# Patient Record
Sex: FEMALE | Race: WHITE | NOT HISPANIC OR LATINO
[De-identification: names, ages, dates, MRNs, and addresses within clinical notes are randomized per-mention and may not be internally consistent; named-entity substitution may affect disease eponyms.]

---

## 2017-05-17 ENCOUNTER — APPOINTMENT (OUTPATIENT)
Dept: PHYSICAL MEDICINE AND REHAB | Facility: CLINIC | Age: 72
End: 2017-05-17

## 2017-05-17 VITALS
HEART RATE: 77 BPM | SYSTOLIC BLOOD PRESSURE: 114 MMHG | OXYGEN SATURATION: 98 % | TEMPERATURE: 98.1 F | DIASTOLIC BLOOD PRESSURE: 71 MMHG

## 2017-05-17 DIAGNOSIS — G54.6 PHANTOM LIMB SYNDROME WITH PAIN: ICD-10-CM

## 2018-10-17 ENCOUNTER — APPOINTMENT (OUTPATIENT)
Dept: PHYSICAL MEDICINE AND REHAB | Facility: CLINIC | Age: 73
End: 2018-10-17
Payer: MEDICARE

## 2018-10-17 VITALS — HEART RATE: 83 BPM | SYSTOLIC BLOOD PRESSURE: 146 MMHG | DIASTOLIC BLOOD PRESSURE: 82 MMHG

## 2018-10-17 PROCEDURE — 99213 OFFICE O/P EST LOW 20 MIN: CPT | Mod: GC

## 2019-04-30 ENCOUNTER — APPOINTMENT (OUTPATIENT)
Dept: PHYSICAL MEDICINE AND REHAB | Facility: CLINIC | Age: 74
End: 2019-04-30
Payer: MEDICARE

## 2019-04-30 VITALS — OXYGEN SATURATION: 98 % | DIASTOLIC BLOOD PRESSURE: 71 MMHG | HEART RATE: 73 BPM | SYSTOLIC BLOOD PRESSURE: 112 MMHG

## 2019-04-30 DIAGNOSIS — Z89.611 ACQUIRED ABSENCE OF RIGHT LEG ABOVE KNEE: ICD-10-CM

## 2019-04-30 PROCEDURE — 99213 OFFICE O/P EST LOW 20 MIN: CPT

## 2019-04-30 NOTE — PHYSICAL EXAM
[FreeTextEntry1] : General: Well developed female in no apparent distress. Patient is awake, alert, and oriented x3. Cooperative.\par \par Extremities: Right AKA which is conical in shape, no skin adhesions, no skin breakdown.   mild distal erythema noted. No callus formation. Fleshy in the posterior aspect. Full active range of motion.\par \par Motor:\par All upper extremities-tone normal, active range of motion within functional limits with 5/5 motor power throughout. Thumb to digit opposition intact bilaterally.\par Both lower extremities-tone normal, active range of motion within functional limits with 5/5 motor power throughout area at\par \par Sensory: Intact to light touch and pinprick in both lower extremities.\par \par Functional status: Patient with right AK prosthesis with the microprocessor knee unit and ischial containment socket. Patient clearly descending too far into the socket. Patient ambulated with a vaulting gait with min knee flexion noted during swing phase. Mild Trendelenburg gait present on the right. Patient is a K3 ambulator.

## 2019-04-30 NOTE — REVIEW OF SYSTEMS
[Recent Change In Weight] : ~T recent weight change [Difficulty Walking] : difficulty walking [Negative] : Gastrointestinal [Fever] : no fever [Incontinence] : no incontinence [Muscle Weakness] : no muscle weakness

## 2019-04-30 NOTE — HISTORY OF PRESENT ILLNESS
[FreeTextEntry1] : Patient is a 73-year-old female history of hypertension, PVD, status post right AKA (2010) who presents for a prosthetic evaluation. Patient received an AK socket replacement in October 2018. Patient reports losing approximately 5 pounds and has volume loss at the residual limb resulting in the current socket being too large. Patient states she's migrating to far into the socket causing pain at her groin and episodic redness. No skin breakdown. Patient has reported episodes of losing suction suspension and the prosthesis fell off one time. Functionally she is an independent ambulator with her right AK prosthesis SAC in the community. Patient is independent in all transfers and activities of daily living.

## 2019-04-30 NOTE — ASSESSMENT
[FreeTextEntry1] : Patient is a 73-year-old white female history of hypertension, peripheral vascular disease who is status post right AKA (2010) current prosthetic socket is too large, ill fitting and causing pain and gait impairments noted above. In addition patient has been losing suction and the liner is loose and the seal is not applying contact with the socket.Patient requires a prosthetic socket replacement to improve comfort, gait mechanics and safe ambulation. Patient is a K3 ambulator. Prescription provided for a new right custom molded AK Socket replacement with a total contact acrylic socket, flexible inner socket, silicone seal- in liner, ultralight allignable components, outer protective cover, 6 multiple ply socks, 6 single ply socks.

## 2021-01-01 ENCOUNTER — INPATIENT (INPATIENT)
Facility: HOSPITAL | Age: 76
LOS: 2 days | DRG: 871 | End: 2021-10-19
Attending: STUDENT IN AN ORGANIZED HEALTH CARE EDUCATION/TRAINING PROGRAM | Admitting: STUDENT IN AN ORGANIZED HEALTH CARE EDUCATION/TRAINING PROGRAM
Payer: MEDICARE

## 2021-01-01 ENCOUNTER — RESULT REVIEW (OUTPATIENT)
Age: 76
End: 2021-01-01

## 2021-01-01 VITALS
RESPIRATION RATE: 18 BRPM | DIASTOLIC BLOOD PRESSURE: 62 MMHG | SYSTOLIC BLOOD PRESSURE: 82 MMHG | OXYGEN SATURATION: 91 % | TEMPERATURE: 98 F | WEIGHT: 110.01 LBS | HEART RATE: 94 BPM

## 2021-01-01 VITALS — RESPIRATION RATE: 30 BRPM | HEART RATE: 98 BPM | OXYGEN SATURATION: 62 %

## 2021-01-01 DIAGNOSIS — Z29.9 ENCOUNTER FOR PROPHYLACTIC MEASURES, UNSPECIFIED: ICD-10-CM

## 2021-01-01 DIAGNOSIS — R09.89 OTHER SPECIFIED SYMPTOMS AND SIGNS INVOLVING THE CIRCULATORY AND RESPIRATORY SYSTEMS: ICD-10-CM

## 2021-01-01 DIAGNOSIS — J18.9 PNEUMONIA, UNSPECIFIED ORGANISM: ICD-10-CM

## 2021-01-01 DIAGNOSIS — Z98.890 OTHER SPECIFIED POSTPROCEDURAL STATES: Chronic | ICD-10-CM

## 2021-01-01 DIAGNOSIS — Z86.59 PERSONAL HISTORY OF OTHER MENTAL AND BEHAVIORAL DISORDERS: ICD-10-CM

## 2021-01-01 DIAGNOSIS — K21.9 GASTRO-ESOPHAGEAL REFLUX DISEASE WITHOUT ESOPHAGITIS: ICD-10-CM

## 2021-01-01 DIAGNOSIS — Z96.649 PRESENCE OF UNSPECIFIED ARTIFICIAL HIP JOINT: Chronic | ICD-10-CM

## 2021-01-01 DIAGNOSIS — I10 ESSENTIAL (PRIMARY) HYPERTENSION: ICD-10-CM

## 2021-01-01 DIAGNOSIS — A41.9 SEPSIS, UNSPECIFIED ORGANISM: ICD-10-CM

## 2021-01-01 DIAGNOSIS — I95.9 HYPOTENSION, UNSPECIFIED: ICD-10-CM

## 2021-01-01 DIAGNOSIS — R53.1 WEAKNESS: ICD-10-CM

## 2021-01-01 DIAGNOSIS — I73.9 PERIPHERAL VASCULAR DISEASE, UNSPECIFIED: ICD-10-CM

## 2021-01-01 DIAGNOSIS — E03.9 HYPOTHYROIDISM, UNSPECIFIED: ICD-10-CM

## 2021-01-01 DIAGNOSIS — Z86.79 PERSONAL HISTORY OF OTHER DISEASES OF THE CIRCULATORY SYSTEM: ICD-10-CM

## 2021-01-01 DIAGNOSIS — E78.5 HYPERLIPIDEMIA, UNSPECIFIED: ICD-10-CM

## 2021-01-01 DIAGNOSIS — Z89.611 ACQUIRED ABSENCE OF RIGHT LEG ABOVE KNEE: Chronic | ICD-10-CM

## 2021-01-01 DIAGNOSIS — J96.01 ACUTE RESPIRATORY FAILURE WITH HYPOXIA: ICD-10-CM

## 2021-01-01 DIAGNOSIS — E87.8 OTHER DISORDERS OF ELECTROLYTE AND FLUID BALANCE, NOT ELSEWHERE CLASSIFIED: ICD-10-CM

## 2021-01-01 DIAGNOSIS — R91.8 OTHER NONSPECIFIC ABNORMAL FINDING OF LUNG FIELD: ICD-10-CM

## 2021-01-01 DIAGNOSIS — Z90.710 ACQUIRED ABSENCE OF BOTH CERVIX AND UTERUS: Chronic | ICD-10-CM

## 2021-01-01 DIAGNOSIS — J90 PLEURAL EFFUSION, NOT ELSEWHERE CLASSIFIED: ICD-10-CM

## 2021-01-01 LAB
ALBUMIN SERPL ELPH-MCNC: 1.2 G/DL — LOW (ref 3.3–5)
ALBUMIN SERPL ELPH-MCNC: 1.3 G/DL — LOW (ref 3.3–5)
ALBUMIN SERPL ELPH-MCNC: 1.5 G/DL — LOW (ref 3.3–5)
ALBUMIN SERPL ELPH-MCNC: 1.5 G/DL — LOW (ref 3.3–5)
ALBUMIN SERPL ELPH-MCNC: 1.6 G/DL — LOW (ref 3.3–5)
ALBUMIN SERPL ELPH-MCNC: 1.7 G/DL — LOW (ref 3.3–5)
ALP SERPL-CCNC: 161 U/L — HIGH (ref 40–120)
ALP SERPL-CCNC: 168 U/L — HIGH (ref 40–120)
ALP SERPL-CCNC: 183 U/L — HIGH (ref 40–120)
ALP SERPL-CCNC: 188 U/L — HIGH (ref 40–120)
ALP SERPL-CCNC: 197 U/L — HIGH (ref 40–120)
ALP SERPL-CCNC: 211 U/L — HIGH (ref 40–120)
ALT FLD-CCNC: 212 U/L — HIGH (ref 12–78)
ALT FLD-CCNC: 31 U/L — SIGNIFICANT CHANGE UP (ref 12–78)
ALT FLD-CCNC: 33 U/L — SIGNIFICANT CHANGE UP (ref 12–78)
ALT FLD-CCNC: 49 U/L — SIGNIFICANT CHANGE UP (ref 12–78)
ALT FLD-CCNC: 66 U/L — SIGNIFICANT CHANGE UP (ref 12–78)
ALT FLD-CCNC: 72 U/L — SIGNIFICANT CHANGE UP (ref 12–78)
ANION GAP SERPL CALC-SCNC: 10 MMOL/L — SIGNIFICANT CHANGE UP (ref 5–17)
ANION GAP SERPL CALC-SCNC: 12 MMOL/L — SIGNIFICANT CHANGE UP (ref 5–17)
ANION GAP SERPL CALC-SCNC: 15 MMOL/L — SIGNIFICANT CHANGE UP (ref 5–17)
ANION GAP SERPL CALC-SCNC: 20 MMOL/L — HIGH (ref 5–17)
ANION GAP SERPL CALC-SCNC: 7 MMOL/L — SIGNIFICANT CHANGE UP (ref 5–17)
ANION GAP SERPL CALC-SCNC: 7 MMOL/L — SIGNIFICANT CHANGE UP (ref 5–17)
ANION GAP SERPL CALC-SCNC: 8 MMOL/L — SIGNIFICANT CHANGE UP (ref 5–17)
APTT BLD: 26.5 SEC — LOW (ref 27.5–35.5)
APTT BLD: 40.9 SEC — HIGH (ref 27.5–35.5)
AST SERPL-CCNC: 101 U/L — HIGH (ref 15–37)
AST SERPL-CCNC: 1066 U/L — HIGH (ref 15–37)
AST SERPL-CCNC: 160 U/L — HIGH (ref 15–37)
AST SERPL-CCNC: 198 U/L — HIGH (ref 15–37)
AST SERPL-CCNC: 62 U/L — HIGH (ref 15–37)
AST SERPL-CCNC: 67 U/L — HIGH (ref 15–37)
B PERT IGG+IGM PNL SER: ABNORMAL
BASE EXCESS BLDA CALC-SCNC: -12.5 MMOL/L — LOW (ref -2–3)
BASE EXCESS BLDA CALC-SCNC: -16.5 MMOL/L — LOW (ref -2–3)
BASE EXCESS BLDA CALC-SCNC: -21.9 MMOL/L — LOW (ref -2–3)
BASE EXCESS BLDA CALC-SCNC: -3.1 MMOL/L — LOW (ref -2–3)
BASE EXCESS BLDA CALC-SCNC: -4.5 MMOL/L — LOW (ref -2–3)
BASE EXCESS BLDA CALC-SCNC: -5.6 MMOL/L — LOW (ref -2–3)
BASE EXCESS BLDA CALC-SCNC: -7.9 MMOL/L — LOW (ref -2–3)
BASE EXCESS BLDA CALC-SCNC: -8 MMOL/L — LOW (ref -2–3)
BASE EXCESS BLDA CALC-SCNC: -9.2 MMOL/L — LOW (ref -2–3)
BASE EXCESS BLDV CALC-SCNC: -4.7 MMOL/L — LOW (ref -2–3)
BASOPHILS # BLD AUTO: 0 K/UL — SIGNIFICANT CHANGE UP (ref 0–0.2)
BASOPHILS # BLD AUTO: 0.04 K/UL — SIGNIFICANT CHANGE UP (ref 0–0.2)
BASOPHILS # BLD AUTO: 0.04 K/UL — SIGNIFICANT CHANGE UP (ref 0–0.2)
BASOPHILS # BLD AUTO: 0.06 K/UL — SIGNIFICANT CHANGE UP (ref 0–0.2)
BASOPHILS NFR BLD AUTO: 0 % — SIGNIFICANT CHANGE UP (ref 0–2)
BASOPHILS NFR BLD AUTO: 0.2 % — SIGNIFICANT CHANGE UP (ref 0–2)
BASOPHILS NFR BLD AUTO: 0.2 % — SIGNIFICANT CHANGE UP (ref 0–2)
BASOPHILS NFR BLD AUTO: 0.3 % — SIGNIFICANT CHANGE UP (ref 0–2)
BILIRUB SERPL-MCNC: 0.4 MG/DL — SIGNIFICANT CHANGE UP (ref 0.2–1.2)
BILIRUB SERPL-MCNC: 0.4 MG/DL — SIGNIFICANT CHANGE UP (ref 0.2–1.2)
BILIRUB SERPL-MCNC: 0.5 MG/DL — SIGNIFICANT CHANGE UP (ref 0.2–1.2)
BILIRUB SERPL-MCNC: 0.5 MG/DL — SIGNIFICANT CHANGE UP (ref 0.2–1.2)
BILIRUB SERPL-MCNC: 0.6 MG/DL — SIGNIFICANT CHANGE UP (ref 0.2–1.2)
BILIRUB SERPL-MCNC: 0.9 MG/DL — SIGNIFICANT CHANGE UP (ref 0.2–1.2)
BLOOD GAS COMMENTS ARTERIAL: SIGNIFICANT CHANGE UP
BLOOD GAS COMMENTS, VENOUS: SIGNIFICANT CHANGE UP
BUN SERPL-MCNC: 10 MG/DL — SIGNIFICANT CHANGE UP (ref 7–23)
BUN SERPL-MCNC: 12 MG/DL — SIGNIFICANT CHANGE UP (ref 7–23)
BUN SERPL-MCNC: 15 MG/DL — SIGNIFICANT CHANGE UP (ref 7–23)
BUN SERPL-MCNC: 17 MG/DL — SIGNIFICANT CHANGE UP (ref 7–23)
BUN SERPL-MCNC: 17 MG/DL — SIGNIFICANT CHANGE UP (ref 7–23)
CALCIUM SERPL-MCNC: 6.7 MG/DL — LOW (ref 8.5–10.1)
CALCIUM SERPL-MCNC: 6.9 MG/DL — LOW (ref 8.5–10.1)
CALCIUM SERPL-MCNC: 7.2 MG/DL — LOW (ref 8.5–10.1)
CALCIUM SERPL-MCNC: 7.3 MG/DL — LOW (ref 8.5–10.1)
CALCIUM SERPL-MCNC: 7.4 MG/DL — LOW (ref 8.5–10.1)
CALCIUM SERPL-MCNC: 7.5 MG/DL — LOW (ref 8.5–10.1)
CALCIUM SERPL-MCNC: 7.7 MG/DL — LOW (ref 8.5–10.1)
CEA SERPL-MCNC: 35.7 NG/ML — HIGH (ref 0–3.8)
CHLORIDE SERPL-SCNC: 112 MMOL/L — HIGH (ref 96–108)
CHLORIDE SERPL-SCNC: 113 MMOL/L — HIGH (ref 96–108)
CHLORIDE SERPL-SCNC: 113 MMOL/L — HIGH (ref 96–108)
CHLORIDE SERPL-SCNC: 114 MMOL/L — HIGH (ref 96–108)
CHLORIDE SERPL-SCNC: 114 MMOL/L — HIGH (ref 96–108)
CHLORIDE SERPL-SCNC: 118 MMOL/L — HIGH (ref 96–108)
CHLORIDE SERPL-SCNC: 119 MMOL/L — HIGH (ref 96–108)
CO2 SERPL-SCNC: 11 MMOL/L — LOW (ref 22–31)
CO2 SERPL-SCNC: 13 MMOL/L — LOW (ref 22–31)
CO2 SERPL-SCNC: 21 MMOL/L — LOW (ref 22–31)
CO2 SERPL-SCNC: 23 MMOL/L — SIGNIFICANT CHANGE UP (ref 22–31)
CO2 SERPL-SCNC: 23 MMOL/L — SIGNIFICANT CHANGE UP (ref 22–31)
COLOR FLD: SIGNIFICANT CHANGE UP
COVID-19 SPIKE DOMAIN AB INTERP: POSITIVE
COVID-19 SPIKE DOMAIN ANTIBODY RESULT: 38.5 U/ML — HIGH
CREAT SERPL-MCNC: 0.31 MG/DL — LOW (ref 0.5–1.3)
CREAT SERPL-MCNC: 0.36 MG/DL — LOW (ref 0.5–1.3)
CREAT SERPL-MCNC: 0.38 MG/DL — LOW (ref 0.5–1.3)
CREAT SERPL-MCNC: 0.56 MG/DL — SIGNIFICANT CHANGE UP (ref 0.5–1.3)
CREAT SERPL-MCNC: 0.58 MG/DL — SIGNIFICANT CHANGE UP (ref 0.5–1.3)
CREAT SERPL-MCNC: 1 MG/DL — SIGNIFICANT CHANGE UP (ref 0.5–1.3)
CREAT SERPL-MCNC: 1.1 MG/DL — SIGNIFICANT CHANGE UP (ref 0.5–1.3)
CRP SERPL-MCNC: 29 MG/L — HIGH
EOSINOPHIL # BLD AUTO: 0 K/UL — SIGNIFICANT CHANGE UP (ref 0–0.5)
EOSINOPHIL # BLD AUTO: 0 K/UL — SIGNIFICANT CHANGE UP (ref 0–0.5)
EOSINOPHIL # BLD AUTO: 0.01 K/UL — SIGNIFICANT CHANGE UP (ref 0–0.5)
EOSINOPHIL # BLD AUTO: 0.02 K/UL — SIGNIFICANT CHANGE UP (ref 0–0.5)
EOSINOPHIL # FLD: 0 % — SIGNIFICANT CHANGE UP
EOSINOPHIL NFR BLD AUTO: 0 % — SIGNIFICANT CHANGE UP (ref 0–6)
EOSINOPHIL NFR BLD AUTO: 0.1 % — SIGNIFICANT CHANGE UP (ref 0–6)
FLUID INTAKE SUBSTANCE CLASS: SIGNIFICANT CHANGE UP
FLUID SEGMENTED GRANULOCYTES: 80 % — SIGNIFICANT CHANGE UP
FOLATE SERPL-MCNC: 10.4 NG/ML — SIGNIFICANT CHANGE UP
FOLATE+VIT B12 SERBLD-IMP: 0 % — SIGNIFICANT CHANGE UP
GAS PNL BLDA: SIGNIFICANT CHANGE UP
GLUCOSE SERPL-MCNC: 106 MG/DL — HIGH (ref 70–99)
GLUCOSE SERPL-MCNC: 120 MG/DL — HIGH (ref 70–99)
GLUCOSE SERPL-MCNC: 139 MG/DL — HIGH (ref 70–99)
GLUCOSE SERPL-MCNC: 149 MG/DL — HIGH (ref 70–99)
GLUCOSE SERPL-MCNC: 216 MG/DL — HIGH (ref 70–99)
GLUCOSE SERPL-MCNC: 256 MG/DL — HIGH (ref 70–99)
GLUCOSE SERPL-MCNC: 97 MG/DL — SIGNIFICANT CHANGE UP (ref 70–99)
GRAM STN FLD: SIGNIFICANT CHANGE UP
HCO3 BLDA-SCNC: 13 MMOL/L — LOW (ref 21–28)
HCO3 BLDA-SCNC: 16 MMOL/L — LOW (ref 21–28)
HCO3 BLDA-SCNC: 20 MMOL/L — LOW (ref 21–28)
HCO3 BLDA-SCNC: 20 MMOL/L — LOW (ref 21–28)
HCO3 BLDA-SCNC: 21 MMOL/L — SIGNIFICANT CHANGE UP (ref 21–28)
HCO3 BLDA-SCNC: 22 MMOL/L — SIGNIFICANT CHANGE UP (ref 21–28)
HCO3 BLDA-SCNC: 22 MMOL/L — SIGNIFICANT CHANGE UP (ref 21–28)
HCO3 BLDA-SCNC: 23 MMOL/L — SIGNIFICANT CHANGE UP (ref 21–28)
HCO3 BLDA-SCNC: 9 MMOL/L — CRITICAL LOW (ref 21–28)
HCO3 BLDV-SCNC: 23 MMOL/L — SIGNIFICANT CHANGE UP (ref 22–29)
HCT VFR BLD CALC: 34.9 % — SIGNIFICANT CHANGE UP (ref 34.5–45)
HCT VFR BLD CALC: 37.1 % — SIGNIFICANT CHANGE UP (ref 34.5–45)
HCT VFR BLD CALC: 37.5 % — SIGNIFICANT CHANGE UP (ref 34.5–45)
HCT VFR BLD CALC: 37.5 % — SIGNIFICANT CHANGE UP (ref 34.5–45)
HCT VFR BLD CALC: 37.7 % — SIGNIFICANT CHANGE UP (ref 34.5–45)
HCT VFR BLD CALC: 39.1 % — SIGNIFICANT CHANGE UP (ref 34.5–45)
HCV AB S/CO SERPL IA: 0.08 S/CO — SIGNIFICANT CHANGE UP (ref 0–0.99)
HCV AB SERPL-IMP: SIGNIFICANT CHANGE UP
HGB BLD-MCNC: 10.7 G/DL — LOW (ref 11.5–15.5)
HGB BLD-MCNC: 10.9 G/DL — LOW (ref 11.5–15.5)
HGB BLD-MCNC: 11.3 G/DL — LOW (ref 11.5–15.5)
HGB BLD-MCNC: 11.3 G/DL — LOW (ref 11.5–15.5)
HGB BLD-MCNC: 11.4 G/DL — LOW (ref 11.5–15.5)
HGB BLD-MCNC: 9.8 G/DL — LOW (ref 11.5–15.5)
HOROWITZ INDEX BLDA+IHG-RTO: 100 — SIGNIFICANT CHANGE UP
HOROWITZ INDEX BLDA+IHG-RTO: 70 — SIGNIFICANT CHANGE UP
IMM GRANULOCYTES NFR BLD AUTO: 0.6 % — SIGNIFICANT CHANGE UP (ref 0–1.5)
IMM GRANULOCYTES NFR BLD AUTO: 0.7 % — SIGNIFICANT CHANGE UP (ref 0–1.5)
IMM GRANULOCYTES NFR BLD AUTO: 2.7 % — HIGH (ref 0–1.5)
INR BLD: 1.33 RATIO — HIGH (ref 0.88–1.16)
INR BLD: 1.74 RATIO — HIGH (ref 0.88–1.16)
LACTATE SERPL-SCNC: 12.6 MMOL/L — CRITICAL HIGH (ref 0.7–2)
LACTATE SERPL-SCNC: 2 MMOL/L — SIGNIFICANT CHANGE UP (ref 0.7–2)
LACTATE SERPL-SCNC: 2.3 MMOL/L — HIGH (ref 0.7–2)
LACTATE SERPL-SCNC: 2.5 MMOL/L — HIGH (ref 0.7–2)
LACTATE SERPL-SCNC: 2.6 MMOL/L — HIGH (ref 0.7–2)
LACTATE SERPL-SCNC: 3.9 MMOL/L — HIGH (ref 0.7–2)
LACTATE SERPL-SCNC: 9.5 MMOL/L — CRITICAL HIGH (ref 0.7–2)
LDH SERPL L TO P-CCNC: 946 U/L — HIGH (ref 50–242)
LEGIONELLA AG UR QL: NEGATIVE — SIGNIFICANT CHANGE UP
LIDOCAIN IGE QN: 149 U/L — SIGNIFICANT CHANGE UP (ref 73–393)
LYMPHOCYTES # BLD AUTO: 10.2 % — LOW (ref 13–44)
LYMPHOCYTES # BLD AUTO: 14.2 % — SIGNIFICANT CHANGE UP (ref 13–44)
LYMPHOCYTES # BLD AUTO: 2.05 K/UL — SIGNIFICANT CHANGE UP (ref 1–3.3)
LYMPHOCYTES # BLD AUTO: 2.22 K/UL — SIGNIFICANT CHANGE UP (ref 1–3.3)
LYMPHOCYTES # BLD AUTO: 2.52 K/UL — SIGNIFICANT CHANGE UP (ref 1–3.3)
LYMPHOCYTES # BLD AUTO: 3.43 K/UL — HIGH (ref 1–3.3)
LYMPHOCYTES # BLD AUTO: 6 % — LOW (ref 13–44)
LYMPHOCYTES # BLD AUTO: 9.3 % — LOW (ref 13–44)
LYMPHOCYTES # FLD: 6 % — SIGNIFICANT CHANGE UP
MAGNESIUM SERPL-MCNC: 1.7 MG/DL — SIGNIFICANT CHANGE UP (ref 1.6–2.6)
MAGNESIUM SERPL-MCNC: 1.8 MG/DL — SIGNIFICANT CHANGE UP (ref 1.6–2.6)
MAGNESIUM SERPL-MCNC: 1.9 MG/DL — SIGNIFICANT CHANGE UP (ref 1.6–2.6)
MAGNESIUM SERPL-MCNC: 1.9 MG/DL — SIGNIFICANT CHANGE UP (ref 1.6–2.6)
MAGNESIUM SERPL-MCNC: 2.1 MG/DL — SIGNIFICANT CHANGE UP (ref 1.6–2.6)
MAGNESIUM SERPL-MCNC: 2.1 MG/DL — SIGNIFICANT CHANGE UP (ref 1.6–2.6)
MCHC RBC-ENTMCNC: 25.6 PG — LOW (ref 27–34)
MCHC RBC-ENTMCNC: 25.8 PG — LOW (ref 27–34)
MCHC RBC-ENTMCNC: 25.9 PG — LOW (ref 27–34)
MCHC RBC-ENTMCNC: 25.9 PG — LOW (ref 27–34)
MCHC RBC-ENTMCNC: 26.2 PG — LOW (ref 27–34)
MCHC RBC-ENTMCNC: 26.3 PG — LOW (ref 27–34)
MCHC RBC-ENTMCNC: 28.1 GM/DL — LOW (ref 32–36)
MCHC RBC-ENTMCNC: 28.4 GM/DL — LOW (ref 32–36)
MCHC RBC-ENTMCNC: 29.1 GM/DL — LOW (ref 32–36)
MCHC RBC-ENTMCNC: 29.2 GM/DL — LOW (ref 32–36)
MCHC RBC-ENTMCNC: 30.1 GM/DL — LOW (ref 32–36)
MCHC RBC-ENTMCNC: 30.5 GM/DL — LOW (ref 32–36)
MCV RBC AUTO: 83.9 FL — SIGNIFICANT CHANGE UP (ref 80–100)
MCV RBC AUTO: 85.8 FL — SIGNIFICANT CHANGE UP (ref 80–100)
MCV RBC AUTO: 88.7 FL — SIGNIFICANT CHANGE UP (ref 80–100)
MCV RBC AUTO: 90.1 FL — SIGNIFICANT CHANGE UP (ref 80–100)
MCV RBC AUTO: 91.1 FL — SIGNIFICANT CHANGE UP (ref 80–100)
MCV RBC AUTO: 93.6 FL — SIGNIFICANT CHANGE UP (ref 80–100)
MESOTHL CELL # FLD: 0 % — SIGNIFICANT CHANGE UP
MONOCYTES # BLD AUTO: 1.62 K/UL — HIGH (ref 0–0.9)
MONOCYTES # BLD AUTO: 1.68 K/UL — HIGH (ref 0–0.9)
MONOCYTES # BLD AUTO: 1.92 K/UL — HIGH (ref 0–0.9)
MONOCYTES # BLD AUTO: 3.22 K/UL — HIGH (ref 0–0.9)
MONOCYTES NFR BLD AUTO: 12.3 % — SIGNIFICANT CHANGE UP (ref 2–14)
MONOCYTES NFR BLD AUTO: 4 % — SIGNIFICANT CHANGE UP (ref 2–14)
MONOCYTES NFR BLD AUTO: 8 % — SIGNIFICANT CHANGE UP (ref 2–14)
MONOCYTES NFR BLD AUTO: 8.7 % — SIGNIFICANT CHANGE UP (ref 2–14)
MONOS+MACROS # FLD: 14 % — SIGNIFICANT CHANGE UP
MRSA PCR RESULT.: SIGNIFICANT CHANGE UP
NEUTROPHILS # BLD AUTO: 11.36 K/UL — HIGH (ref 1.8–7.4)
NEUTROPHILS # BLD AUTO: 16.3 K/UL — HIGH (ref 1.8–7.4)
NEUTROPHILS # BLD AUTO: 29.13 K/UL — HIGH (ref 1.8–7.4)
NEUTROPHILS # BLD AUTO: 37.8 K/UL — HIGH (ref 1.8–7.4)
NEUTROPHILS NFR BLD AUTO: 72.5 % — SIGNIFICANT CHANGE UP (ref 43–77)
NEUTROPHILS NFR BLD AUTO: 79.1 % — HIGH (ref 43–77)
NEUTROPHILS NFR BLD AUTO: 80.9 % — HIGH (ref 43–77)
NEUTROPHILS NFR BLD AUTO: 87 % — HIGH (ref 43–77)
NRBC # BLD: 0 /100 WBCS — SIGNIFICANT CHANGE UP (ref 0–0)
NRBC # BLD: SIGNIFICANT CHANGE UP /100 WBCS (ref 0–0)
NRBC # FLD: 0 — SIGNIFICANT CHANGE UP
NT-PROBNP SERPL-SCNC: 613 PG/ML — HIGH (ref 0–125)
OTHER CELLS FLD MANUAL: 0 % — SIGNIFICANT CHANGE UP
PCO2 BLDA: 38 MMHG — HIGH (ref 32–35)
PCO2 BLDA: 41 MMHG — HIGH (ref 32–35)
PCO2 BLDA: 42 MMHG — HIGH (ref 32–35)
PCO2 BLDA: 44 MMHG — HIGH (ref 32–35)
PCO2 BLDA: 46 MMHG — HIGH (ref 32–35)
PCO2 BLDA: 54 MMHG — HIGH (ref 32–35)
PCO2 BLDA: 55 MMHG — HIGH (ref 32–35)
PCO2 BLDA: 58 MMHG — HIGH (ref 32–35)
PCO2 BLDA: 60 MMHG — HIGH (ref 32–35)
PCO2 BLDV: 58 MMHG — HIGH (ref 39–42)
PH BLDA: 7 — CRITICAL LOW (ref 7.35–7.45)
PH BLDA: 7.11 — CRITICAL LOW (ref 7.35–7.45)
PH BLDA: 7.13 — CRITICAL LOW (ref 7.35–7.45)
PH BLDA: 7.16 — CRITICAL LOW (ref 7.35–7.45)
PH BLDA: 7.17 — CRITICAL LOW (ref 7.35–7.45)
PH BLDA: 7.18 — CRITICAL LOW (ref 7.35–7.45)
PH BLDA: 7.22 — LOW (ref 7.35–7.45)
PH BLDA: 7.31 — LOW (ref 7.35–7.45)
PH BLDA: 7.32 — LOW (ref 7.35–7.45)
PH BLDV: 7.21 — LOW (ref 7.32–7.43)
PHOSPHATE SERPL-MCNC: 2.6 MG/DL — SIGNIFICANT CHANGE UP (ref 2.5–4.5)
PHOSPHATE SERPL-MCNC: 2.7 MG/DL — SIGNIFICANT CHANGE UP (ref 2.5–4.5)
PHOSPHATE SERPL-MCNC: 3.1 MG/DL — SIGNIFICANT CHANGE UP (ref 2.5–4.5)
PHOSPHATE SERPL-MCNC: 3.2 MG/DL — SIGNIFICANT CHANGE UP (ref 2.5–4.5)
PHOSPHATE SERPL-MCNC: 4.4 MG/DL — SIGNIFICANT CHANGE UP (ref 2.5–4.5)
PHOSPHATE SERPL-MCNC: 6 MG/DL — HIGH (ref 2.5–4.5)
PLATELET # BLD AUTO: 362 K/UL — SIGNIFICANT CHANGE UP (ref 150–400)
PLATELET # BLD AUTO: 376 K/UL — SIGNIFICANT CHANGE UP (ref 150–400)
PLATELET # BLD AUTO: 390 K/UL — SIGNIFICANT CHANGE UP (ref 150–400)
PLATELET # BLD AUTO: 398 K/UL — SIGNIFICANT CHANGE UP (ref 150–400)
PLATELET # BLD AUTO: 423 K/UL — HIGH (ref 150–400)
PLATELET # BLD AUTO: 483 K/UL — HIGH (ref 150–400)
PO2 BLDA: 46 MMHG — CRITICAL LOW (ref 83–108)
PO2 BLDA: 51 MMHG — LOW (ref 83–108)
PO2 BLDA: 51 MMHG — LOW (ref 83–108)
PO2 BLDA: 58 MMHG — LOW (ref 83–108)
PO2 BLDA: 65 MMHG — LOW (ref 83–108)
PO2 BLDA: 74 MMHG — LOW (ref 83–108)
PO2 BLDA: 74 MMHG — LOW (ref 83–108)
PO2 BLDA: 78 MMHG — LOW (ref 83–108)
PO2 BLDA: <34 MMHG — CRITICAL LOW (ref 83–108)
PO2 BLDV: 47 MMHG — HIGH (ref 25–45)
POTASSIUM SERPL-MCNC: 2.9 MMOL/L — CRITICAL LOW (ref 3.5–5.3)
POTASSIUM SERPL-MCNC: 3.5 MMOL/L — SIGNIFICANT CHANGE UP (ref 3.5–5.3)
POTASSIUM SERPL-MCNC: 3.8 MMOL/L — SIGNIFICANT CHANGE UP (ref 3.5–5.3)
POTASSIUM SERPL-MCNC: 4 MMOL/L — SIGNIFICANT CHANGE UP (ref 3.5–5.3)
POTASSIUM SERPL-MCNC: 4.1 MMOL/L — SIGNIFICANT CHANGE UP (ref 3.5–5.3)
POTASSIUM SERPL-MCNC: 4.4 MMOL/L — SIGNIFICANT CHANGE UP (ref 3.5–5.3)
POTASSIUM SERPL-MCNC: 4.5 MMOL/L — SIGNIFICANT CHANGE UP (ref 3.5–5.3)
POTASSIUM SERPL-SCNC: 2.9 MMOL/L — CRITICAL LOW (ref 3.5–5.3)
POTASSIUM SERPL-SCNC: 3.5 MMOL/L — SIGNIFICANT CHANGE UP (ref 3.5–5.3)
POTASSIUM SERPL-SCNC: 3.8 MMOL/L — SIGNIFICANT CHANGE UP (ref 3.5–5.3)
POTASSIUM SERPL-SCNC: 4 MMOL/L — SIGNIFICANT CHANGE UP (ref 3.5–5.3)
POTASSIUM SERPL-SCNC: 4.1 MMOL/L — SIGNIFICANT CHANGE UP (ref 3.5–5.3)
POTASSIUM SERPL-SCNC: 4.4 MMOL/L — SIGNIFICANT CHANGE UP (ref 3.5–5.3)
POTASSIUM SERPL-SCNC: 4.5 MMOL/L — SIGNIFICANT CHANGE UP (ref 3.5–5.3)
PROCALCITONIN SERPL-MCNC: 3.85 NG/ML — HIGH (ref 0–0.04)
PROT SERPL-MCNC: 3.8 G/DL — LOW (ref 6–8.3)
PROT SERPL-MCNC: 4.1 G/DL — LOW (ref 6–8.3)
PROT SERPL-MCNC: 4.5 G/DL — LOW (ref 6–8.3)
PROT SERPL-MCNC: 4.7 G/DL — LOW (ref 6–8.3)
PROT SERPL-MCNC: 4.9 G/DL — LOW (ref 6–8.3)
PROT SERPL-MCNC: 5.1 G/DL — LOW (ref 6–8.3)
PROTHROM AB SERPL-ACNC: 15.4 SEC — HIGH (ref 10.6–13.6)
PROTHROM AB SERPL-ACNC: 19.8 SEC — HIGH (ref 10.6–13.6)
RAPID RVP RESULT: SIGNIFICANT CHANGE UP
RBC # BLD: 3.73 M/UL — LOW (ref 3.8–5.2)
RBC # BLD: 4.14 M/UL — SIGNIFICANT CHANGE UP (ref 3.8–5.2)
RBC # BLD: 4.16 M/UL — SIGNIFICANT CHANGE UP (ref 3.8–5.2)
RBC # BLD: 4.37 M/UL — SIGNIFICANT CHANGE UP (ref 3.8–5.2)
RBC # BLD: 4.41 M/UL — SIGNIFICANT CHANGE UP (ref 3.8–5.2)
RBC # BLD: 4.42 M/UL — SIGNIFICANT CHANGE UP (ref 3.8–5.2)
RBC # FLD: 17.4 % — HIGH (ref 10.3–14.5)
RBC # FLD: 17.5 % — HIGH (ref 10.3–14.5)
RBC # FLD: 17.6 % — HIGH (ref 10.3–14.5)
RBC # FLD: 17.7 % — HIGH (ref 10.3–14.5)
RBC # FLD: 17.7 % — HIGH (ref 10.3–14.5)
RBC # FLD: 18 % — HIGH (ref 10.3–14.5)
RCV VOL RI: 3000 /UL — HIGH (ref 0–0)
S AUREUS DNA NOSE QL NAA+PROBE: SIGNIFICANT CHANGE UP
S PNEUM AG UR QL: NEGATIVE — SIGNIFICANT CHANGE UP
SAO2 % BLDA: 50.2 % — LOW (ref 94–98)
SAO2 % BLDA: 73.5 % — LOW (ref 94–98)
SAO2 % BLDA: 80.3 % — LOW (ref 94–98)
SAO2 % BLDA: 82.1 % — LOW (ref 94–98)
SAO2 % BLDA: 87.6 % — LOW (ref 94–98)
SAO2 % BLDA: 90.9 % — LOW (ref 94–98)
SAO2 % BLDA: 95.2 % — SIGNIFICANT CHANGE UP (ref 94–98)
SAO2 % BLDA: 95.6 % — SIGNIFICANT CHANGE UP (ref 94–98)
SAO2 % BLDA: 96.2 % — SIGNIFICANT CHANGE UP (ref 94–98)
SAO2 % BLDV: 70.7 % — SIGNIFICANT CHANGE UP (ref 67–88)
SARS-COV-2 IGG+IGM SERPL QL IA: 38.5 U/ML — HIGH
SARS-COV-2 IGG+IGM SERPL QL IA: POSITIVE
SARS-COV-2 RNA SPEC QL NAA+PROBE: SIGNIFICANT CHANGE UP
SODIUM SERPL-SCNC: 141 MMOL/L — SIGNIFICANT CHANGE UP (ref 135–145)
SODIUM SERPL-SCNC: 143 MMOL/L — SIGNIFICANT CHANGE UP (ref 135–145)
SODIUM SERPL-SCNC: 144 MMOL/L — SIGNIFICANT CHANGE UP (ref 135–145)
SODIUM SERPL-SCNC: 145 MMOL/L — SIGNIFICANT CHANGE UP (ref 135–145)
SODIUM SERPL-SCNC: 146 MMOL/L — HIGH (ref 135–145)
SODIUM SERPL-SCNC: 148 MMOL/L — HIGH (ref 135–145)
SODIUM SERPL-SCNC: 148 MMOL/L — HIGH (ref 135–145)
SPECIMEN SOURCE: SIGNIFICANT CHANGE UP
TOTAL NUCLEATED CELL COUNT, BODY FLUID: 1170 /UL — SIGNIFICANT CHANGE UP
TSH SERPL-MCNC: 1 UIU/ML — SIGNIFICANT CHANGE UP (ref 0.36–3.74)
TUBE TYPE: SIGNIFICANT CHANGE UP
VIT B12 SERPL-MCNC: 1271 PG/ML — HIGH (ref 232–1245)
WBC # BLD: 15.66 K/UL — HIGH (ref 3.8–10.5)
WBC # BLD: 20.16 K/UL — HIGH (ref 3.8–10.5)
WBC # BLD: 32.48 K/UL — HIGH (ref 3.8–10.5)
WBC # BLD: 36.85 K/UL — HIGH (ref 3.8–10.5)
WBC # BLD: 38.78 K/UL — HIGH (ref 3.8–10.5)
WBC # BLD: 42 K/UL — CRITICAL HIGH (ref 3.8–10.5)
WBC # FLD AUTO: 15.66 K/UL — HIGH (ref 3.8–10.5)
WBC # FLD AUTO: 20.16 K/UL — HIGH (ref 3.8–10.5)
WBC # FLD AUTO: 32.48 K/UL — HIGH (ref 3.8–10.5)
WBC # FLD AUTO: 36.85 K/UL — HIGH (ref 3.8–10.5)
WBC # FLD AUTO: 38.78 K/UL — HIGH (ref 3.8–10.5)
WBC # FLD AUTO: 42 K/UL — CRITICAL HIGH (ref 3.8–10.5)

## 2021-01-01 PROCEDURE — 93971 EXTREMITY STUDY: CPT | Mod: 26,LT

## 2021-01-01 PROCEDURE — 82378 CARCINOEMBRYONIC ANTIGEN: CPT

## 2021-01-01 PROCEDURE — 87102 FUNGUS ISOLATION CULTURE: CPT

## 2021-01-01 PROCEDURE — 99291 CRITICAL CARE FIRST HOUR: CPT | Mod: 25

## 2021-01-01 PROCEDURE — 87070 CULTURE OTHR SPECIMN AEROBIC: CPT

## 2021-01-01 PROCEDURE — 82746 ASSAY OF FOLIC ACID SERUM: CPT

## 2021-01-01 PROCEDURE — 86140 C-REACTIVE PROTEIN: CPT

## 2021-01-01 PROCEDURE — 99233 SBSQ HOSP IP/OBS HIGH 50: CPT

## 2021-01-01 PROCEDURE — 84145 PROCALCITONIN (PCT): CPT

## 2021-01-01 PROCEDURE — 87641 MR-STAPH DNA AMP PROBE: CPT

## 2021-01-01 PROCEDURE — 88305 TISSUE EXAM BY PATHOLOGIST: CPT

## 2021-01-01 PROCEDURE — 99233 SBSQ HOSP IP/OBS HIGH 50: CPT | Mod: GC

## 2021-01-01 PROCEDURE — 36600 WITHDRAWAL OF ARTERIAL BLOOD: CPT

## 2021-01-01 PROCEDURE — 94660 CPAP INITIATION&MGMT: CPT

## 2021-01-01 PROCEDURE — 85025 COMPLETE CBC W/AUTO DIFF WBC: CPT

## 2021-01-01 PROCEDURE — 86803 HEPATITIS C AB TEST: CPT

## 2021-01-01 PROCEDURE — 85610 PROTHROMBIN TIME: CPT

## 2021-01-01 PROCEDURE — 85027 COMPLETE CBC AUTOMATED: CPT

## 2021-01-01 PROCEDURE — 0225U NFCT DS DNA&RNA 21 SARSCOV2: CPT

## 2021-01-01 PROCEDURE — 74176 CT ABD & PELVIS W/O CONTRAST: CPT | Mod: MA

## 2021-01-01 PROCEDURE — P9045: CPT

## 2021-01-01 PROCEDURE — 84443 ASSAY THYROID STIM HORMONE: CPT

## 2021-01-01 PROCEDURE — 80053 COMPREHEN METABOLIC PANEL: CPT

## 2021-01-01 PROCEDURE — 93005 ELECTROCARDIOGRAM TRACING: CPT

## 2021-01-01 PROCEDURE — 76604 US EXAM CHEST: CPT | Mod: 26,GC

## 2021-01-01 PROCEDURE — 31624 DX BRONCHOSCOPE/LAVAGE: CPT | Mod: GC

## 2021-01-01 PROCEDURE — 93308 TTE F-UP OR LMTD: CPT | Mod: 26,GC

## 2021-01-01 PROCEDURE — 84484 ASSAY OF TROPONIN QUANT: CPT

## 2021-01-01 PROCEDURE — 99292 CRITICAL CARE ADDL 30 MIN: CPT | Mod: 25

## 2021-01-01 PROCEDURE — 99291 CRITICAL CARE FIRST HOUR: CPT

## 2021-01-01 PROCEDURE — 82803 BLOOD GASES ANY COMBINATION: CPT

## 2021-01-01 PROCEDURE — 36556 INSERT NON-TUNNEL CV CATH: CPT | Mod: GC

## 2021-01-01 PROCEDURE — 87449 NOS EACH ORGANISM AG IA: CPT

## 2021-01-01 PROCEDURE — 99285 EMERGENCY DEPT VISIT HI MDM: CPT | Mod: 25

## 2021-01-01 PROCEDURE — 80048 BASIC METABOLIC PNL TOTAL CA: CPT

## 2021-01-01 PROCEDURE — 82550 ASSAY OF CK (CPK): CPT

## 2021-01-01 PROCEDURE — 88305 TISSUE EXAM BY PATHOLOGIST: CPT | Mod: 26

## 2021-01-01 PROCEDURE — 71045 X-RAY EXAM CHEST 1 VIEW: CPT

## 2021-01-01 PROCEDURE — 71045 X-RAY EXAM CHEST 1 VIEW: CPT | Mod: 26

## 2021-01-01 PROCEDURE — 74176 CT ABD & PELVIS W/O CONTRAST: CPT | Mod: 26,MA

## 2021-01-01 PROCEDURE — 99285 EMERGENCY DEPT VISIT HI MDM: CPT

## 2021-01-01 PROCEDURE — 88108 CYTOPATH CONCENTRATE TECH: CPT | Mod: 26

## 2021-01-01 PROCEDURE — 94002 VENT MGMT INPAT INIT DAY: CPT

## 2021-01-01 PROCEDURE — 36620 INSERTION CATHETER ARTERY: CPT | Mod: GC

## 2021-01-01 PROCEDURE — 76937 US GUIDE VASCULAR ACCESS: CPT | Mod: 26,GC

## 2021-01-01 PROCEDURE — 89051 BODY FLUID CELL COUNT: CPT

## 2021-01-01 PROCEDURE — 36415 COLL VENOUS BLD VENIPUNCTURE: CPT

## 2021-01-01 PROCEDURE — 84100 ASSAY OF PHOSPHORUS: CPT

## 2021-01-01 PROCEDURE — 94760 N-INVAS EAR/PLS OXIMETRY 1: CPT

## 2021-01-01 PROCEDURE — 83615 LACTATE (LD) (LDH) ENZYME: CPT

## 2021-01-01 PROCEDURE — 96365 THER/PROPH/DIAG IV INF INIT: CPT

## 2021-01-01 PROCEDURE — 82607 VITAMIN B-12: CPT

## 2021-01-01 PROCEDURE — 83690 ASSAY OF LIPASE: CPT

## 2021-01-01 PROCEDURE — 93010 ELECTROCARDIOGRAM REPORT: CPT

## 2021-01-01 PROCEDURE — 96375 TX/PRO/DX INJ NEW DRUG ADDON: CPT

## 2021-01-01 PROCEDURE — 31500 INSERT EMERGENCY AIRWAY: CPT | Mod: GC

## 2021-01-01 PROCEDURE — 99223 1ST HOSP IP/OBS HIGH 75: CPT | Mod: GC

## 2021-01-01 PROCEDURE — 93971 EXTREMITY STUDY: CPT

## 2021-01-01 PROCEDURE — 71045 X-RAY EXAM CHEST 1 VIEW: CPT | Mod: 26,76

## 2021-01-01 PROCEDURE — 82962 GLUCOSE BLOOD TEST: CPT

## 2021-01-01 PROCEDURE — 87640 STAPH A DNA AMP PROBE: CPT

## 2021-01-01 PROCEDURE — 94640 AIRWAY INHALATION TREATMENT: CPT

## 2021-01-01 PROCEDURE — 88108 CYTOPATH CONCENTRATE TECH: CPT

## 2021-01-01 PROCEDURE — 83605 ASSAY OF LACTIC ACID: CPT

## 2021-01-01 PROCEDURE — 94003 VENT MGMT INPAT SUBQ DAY: CPT

## 2021-01-01 PROCEDURE — P9047: CPT

## 2021-01-01 PROCEDURE — 87899 AGENT NOS ASSAY W/OPTIC: CPT

## 2021-01-01 PROCEDURE — 71250 CT THORAX DX C-: CPT | Mod: MA

## 2021-01-01 PROCEDURE — 83880 ASSAY OF NATRIURETIC PEPTIDE: CPT

## 2021-01-01 PROCEDURE — 71250 CT THORAX DX C-: CPT | Mod: 26,MA

## 2021-01-01 PROCEDURE — 85730 THROMBOPLASTIN TIME PARTIAL: CPT

## 2021-01-01 PROCEDURE — 87040 BLOOD CULTURE FOR BACTERIA: CPT

## 2021-01-01 PROCEDURE — 86769 SARS-COV-2 COVID-19 ANTIBODY: CPT

## 2021-01-01 PROCEDURE — 83735 ASSAY OF MAGNESIUM: CPT

## 2021-01-01 PROCEDURE — 82553 CREATINE MB FRACTION: CPT

## 2021-01-01 PROCEDURE — 94799 UNLISTED PULMONARY SVC/PX: CPT

## 2021-01-01 RX ORDER — HEPARIN SODIUM 5000 [USP'U]/ML
5000 INJECTION INTRAVENOUS; SUBCUTANEOUS EVERY 12 HOURS
Refills: 0 | Status: DISCONTINUED | OUTPATIENT
Start: 2021-01-01 | End: 2021-01-01

## 2021-01-01 RX ORDER — INFLUENZA VIRUS VACCINE 15; 15; 15; 15 UG/.5ML; UG/.5ML; UG/.5ML; UG/.5ML
0.5 SUSPENSION INTRAMUSCULAR ONCE
Refills: 0 | Status: DISCONTINUED | OUTPATIENT
Start: 2021-01-01 | End: 2021-01-01

## 2021-01-01 RX ORDER — BUDESONIDE, MICRONIZED 100 %
0.5 POWDER (GRAM) MISCELLANEOUS EVERY 12 HOURS
Refills: 0 | Status: DISCONTINUED | OUTPATIENT
Start: 2021-01-01 | End: 2021-01-01

## 2021-01-01 RX ORDER — NIFEDIPINE 30 MG
30 TABLET, EXTENDED RELEASE 24 HR ORAL DAILY
Refills: 0 | Status: DISCONTINUED | OUTPATIENT
Start: 2021-01-01 | End: 2021-01-01

## 2021-01-01 RX ORDER — ATORVASTATIN CALCIUM 80 MG/1
1 TABLET, FILM COATED ORAL
Qty: 0 | Refills: 0 | DISCHARGE

## 2021-01-01 RX ORDER — SODIUM BICARBONATE 1 MEQ/ML
0.23 SYRINGE (ML) INTRAVENOUS
Qty: 150 | Refills: 0 | Status: DISCONTINUED | OUTPATIENT
Start: 2021-01-01 | End: 2021-01-01

## 2021-01-01 RX ORDER — VASOPRESSIN 20 [USP'U]/ML
0.04 INJECTION INTRAVENOUS
Qty: 50 | Refills: 0 | Status: DISCONTINUED | OUTPATIENT
Start: 2021-01-01 | End: 2021-01-01

## 2021-01-01 RX ORDER — PIPERACILLIN AND TAZOBACTAM 4; .5 G/20ML; G/20ML
3.38 INJECTION, POWDER, LYOPHILIZED, FOR SOLUTION INTRAVENOUS EVERY 8 HOURS
Refills: 0 | Status: DISCONTINUED | OUTPATIENT
Start: 2021-01-01 | End: 2021-01-01

## 2021-01-01 RX ORDER — ONDANSETRON 8 MG/1
4 TABLET, FILM COATED ORAL EVERY 8 HOURS
Refills: 0 | Status: DISCONTINUED | OUTPATIENT
Start: 2021-01-01 | End: 2021-01-01

## 2021-01-01 RX ORDER — PIPERACILLIN AND TAZOBACTAM 4; .5 G/20ML; G/20ML
3.38 INJECTION, POWDER, LYOPHILIZED, FOR SOLUTION INTRAVENOUS EVERY 12 HOURS
Refills: 0 | Status: DISCONTINUED | OUTPATIENT
Start: 2021-01-01 | End: 2021-01-01

## 2021-01-01 RX ORDER — FAMOTIDINE 10 MG/ML
40 INJECTION INTRAVENOUS
Refills: 0 | Status: DISCONTINUED | OUTPATIENT
Start: 2021-01-01 | End: 2021-01-01

## 2021-01-01 RX ORDER — LANOLIN ALCOHOL/MO/W.PET/CERES
3 CREAM (GRAM) TOPICAL AT BEDTIME
Refills: 0 | Status: DISCONTINUED | OUTPATIENT
Start: 2021-01-01 | End: 2021-01-01

## 2021-01-01 RX ORDER — ACETAMINOPHEN 500 MG
1000 TABLET ORAL ONCE
Refills: 0 | Status: COMPLETED | OUTPATIENT
Start: 2021-01-01 | End: 2021-01-01

## 2021-01-01 RX ORDER — SODIUM CHLORIDE 9 MG/ML
1000 INJECTION, SOLUTION INTRAVENOUS
Refills: 0 | Status: DISCONTINUED | OUTPATIENT
Start: 2021-01-01 | End: 2021-01-01

## 2021-01-01 RX ORDER — SERTRALINE 25 MG/1
1 TABLET, FILM COATED ORAL
Qty: 0 | Refills: 0 | DISCHARGE

## 2021-01-01 RX ORDER — MINOCYCLINE HYDROCHLORIDE 45 MG/1
1 TABLET, EXTENDED RELEASE ORAL
Qty: 0 | Refills: 0 | DISCHARGE

## 2021-01-01 RX ORDER — FENTANYL CITRATE 50 UG/ML
0.5 INJECTION INTRAVENOUS
Qty: 5000 | Refills: 0 | Status: DISCONTINUED | OUTPATIENT
Start: 2021-01-01 | End: 2021-01-01

## 2021-01-01 RX ORDER — FAMOTIDINE 10 MG/ML
1 INJECTION INTRAVENOUS
Qty: 0 | Refills: 0 | DISCHARGE

## 2021-01-01 RX ORDER — ALLOPURINOL 300 MG
1 TABLET ORAL
Qty: 0 | Refills: 0 | DISCHARGE

## 2021-01-01 RX ORDER — KETAMINE HYDROCHLORIDE 100 MG/ML
0.25 INJECTION INTRAMUSCULAR; INTRAVENOUS
Qty: 1000 | Refills: 0 | Status: DISCONTINUED | OUTPATIENT
Start: 2021-01-01 | End: 2021-01-01

## 2021-01-01 RX ORDER — FENTANYL CITRATE 50 UG/ML
0.5 INJECTION INTRAVENOUS
Qty: 2500 | Refills: 0 | Status: DISCONTINUED | OUTPATIENT
Start: 2021-01-01 | End: 2021-01-01

## 2021-01-01 RX ORDER — ASPIRIN/CALCIUM CARB/MAGNESIUM 324 MG
1 TABLET ORAL
Qty: 0 | Refills: 0 | DISCHARGE

## 2021-01-01 RX ORDER — PANTOPRAZOLE SODIUM 20 MG/1
40 TABLET, DELAYED RELEASE ORAL
Refills: 0 | Status: DISCONTINUED | OUTPATIENT
Start: 2021-01-01 | End: 2021-01-01

## 2021-01-01 RX ORDER — SERTRALINE 25 MG/1
100 TABLET, FILM COATED ORAL DAILY
Refills: 0 | Status: DISCONTINUED | OUTPATIENT
Start: 2021-01-01 | End: 2021-01-01

## 2021-01-01 RX ORDER — CHLORHEXIDINE GLUCONATE 213 G/1000ML
1 SOLUTION TOPICAL
Refills: 0 | Status: DISCONTINUED | OUTPATIENT
Start: 2021-01-01 | End: 2021-01-01

## 2021-01-01 RX ORDER — ALLOPURINOL 300 MG
100 TABLET ORAL DAILY
Refills: 0 | Status: DISCONTINUED | OUTPATIENT
Start: 2021-01-01 | End: 2021-01-01

## 2021-01-01 RX ORDER — ENOXAPARIN SODIUM 100 MG/ML
40 INJECTION SUBCUTANEOUS DAILY
Refills: 0 | Status: DISCONTINUED | OUTPATIENT
Start: 2021-01-01 | End: 2021-01-01

## 2021-01-01 RX ORDER — NIFEDIPINE 30 MG
1 TABLET, EXTENDED RELEASE 24 HR ORAL
Qty: 0 | Refills: 0 | DISCHARGE

## 2021-01-01 RX ORDER — SODIUM CHLORIDE 9 MG/ML
1000 INJECTION, SOLUTION INTRAVENOUS ONCE
Refills: 0 | Status: COMPLETED | OUTPATIENT
Start: 2021-01-01 | End: 2021-01-01

## 2021-01-01 RX ORDER — ONDANSETRON 8 MG/1
4 TABLET, FILM COATED ORAL ONCE
Refills: 0 | Status: COMPLETED | OUTPATIENT
Start: 2021-01-01 | End: 2021-01-01

## 2021-01-01 RX ORDER — ATORVASTATIN CALCIUM 80 MG/1
10 TABLET, FILM COATED ORAL AT BEDTIME
Refills: 0 | Status: DISCONTINUED | OUTPATIENT
Start: 2021-01-01 | End: 2021-01-01

## 2021-01-01 RX ORDER — ALBUMIN HUMAN 25 %
50 VIAL (ML) INTRAVENOUS EVERY 6 HOURS
Refills: 0 | Status: DISCONTINUED | OUTPATIENT
Start: 2021-01-01 | End: 2021-01-01

## 2021-01-01 RX ORDER — LACTOBACILLUS ACIDOPHILUS 100MM CELL
1 CAPSULE ORAL DAILY
Refills: 0 | Status: DISCONTINUED | OUTPATIENT
Start: 2021-01-01 | End: 2021-01-01

## 2021-01-01 RX ORDER — AMIODARONE HYDROCHLORIDE 400 MG/1
150 TABLET ORAL ONCE
Refills: 0 | Status: COMPLETED | OUTPATIENT
Start: 2021-01-01 | End: 2021-01-01

## 2021-01-01 RX ORDER — GABAPENTIN 400 MG/1
1 CAPSULE ORAL
Qty: 0 | Refills: 0 | DISCHARGE

## 2021-01-01 RX ORDER — LEVOTHYROXINE SODIUM 125 MCG
100 TABLET ORAL DAILY
Refills: 0 | Status: DISCONTINUED | OUTPATIENT
Start: 2021-01-01 | End: 2021-01-01

## 2021-01-01 RX ORDER — PANTOPRAZOLE SODIUM 20 MG/1
40 TABLET, DELAYED RELEASE ORAL ONCE
Refills: 0 | Status: COMPLETED | OUTPATIENT
Start: 2021-01-01 | End: 2021-01-01

## 2021-01-01 RX ORDER — METOPROLOL TARTRATE 50 MG
25 TABLET ORAL DAILY
Refills: 0 | Status: DISCONTINUED | OUTPATIENT
Start: 2021-01-01 | End: 2021-01-01

## 2021-01-01 RX ORDER — CISATRACURIUM BESYLATE 2 MG/ML
3 INJECTION INTRAVENOUS
Qty: 200 | Refills: 0 | Status: DISCONTINUED | OUTPATIENT
Start: 2021-01-01 | End: 2021-01-01

## 2021-01-01 RX ORDER — ASPIRIN/CALCIUM CARB/MAGNESIUM 324 MG
81 TABLET ORAL DAILY
Refills: 0 | Status: DISCONTINUED | OUTPATIENT
Start: 2021-01-01 | End: 2021-01-01

## 2021-01-01 RX ORDER — PROPOFOL 10 MG/ML
30 INJECTION, EMULSION INTRAVENOUS ONCE
Refills: 0 | Status: COMPLETED | OUTPATIENT
Start: 2021-01-01 | End: 2021-01-01

## 2021-01-01 RX ORDER — SODIUM CHLORIDE 9 MG/ML
500 INJECTION INTRAMUSCULAR; INTRAVENOUS; SUBCUTANEOUS ONCE
Refills: 0 | Status: COMPLETED | OUTPATIENT
Start: 2021-01-01 | End: 2021-01-01

## 2021-01-01 RX ORDER — LEVOTHYROXINE SODIUM 125 MCG
1 TABLET ORAL
Qty: 0 | Refills: 0 | DISCHARGE

## 2021-01-01 RX ORDER — MILRINONE LACTATE 1 MG/ML
0.12 INJECTION, SOLUTION INTRAVENOUS
Qty: 20 | Refills: 0 | Status: DISCONTINUED | OUTPATIENT
Start: 2021-01-01 | End: 2021-01-01

## 2021-01-01 RX ORDER — MONTELUKAST 4 MG/1
10 TABLET, CHEWABLE ORAL DAILY
Refills: 0 | Status: DISCONTINUED | OUTPATIENT
Start: 2021-01-01 | End: 2021-01-01

## 2021-01-01 RX ORDER — HYDROCORTISONE 20 MG
100 TABLET ORAL EVERY 8 HOURS
Refills: 0 | Status: DISCONTINUED | OUTPATIENT
Start: 2021-01-01 | End: 2021-01-01

## 2021-01-01 RX ORDER — CHOLECALCIFEROL (VITAMIN D3) 125 MCG
1 CAPSULE ORAL
Qty: 0 | Refills: 0 | DISCHARGE

## 2021-01-01 RX ORDER — FENTANYL CITRATE 50 UG/ML
12.5 INJECTION INTRAVENOUS ONCE
Refills: 0 | Status: DISCONTINUED | OUTPATIENT
Start: 2021-01-01 | End: 2021-01-01

## 2021-01-01 RX ORDER — LANOLIN ALCOHOL/MO/W.PET/CERES
3 CREAM (GRAM) TOPICAL ONCE
Refills: 0 | Status: COMPLETED | OUTPATIENT
Start: 2021-01-01 | End: 2021-01-01

## 2021-01-01 RX ORDER — MINOCYCLINE HYDROCHLORIDE 45 MG/1
100 TABLET, EXTENDED RELEASE ORAL
Refills: 0 | Status: DISCONTINUED | OUTPATIENT
Start: 2021-01-01 | End: 2021-01-01

## 2021-01-01 RX ORDER — VANCOMYCIN HCL 1 G
750 VIAL (EA) INTRAVENOUS ONCE
Refills: 0 | Status: COMPLETED | OUTPATIENT
Start: 2021-01-01 | End: 2021-01-01

## 2021-01-01 RX ORDER — FUROSEMIDE 40 MG
60 TABLET ORAL ONCE
Refills: 0 | Status: DISCONTINUED | OUTPATIENT
Start: 2021-01-01 | End: 2021-01-01

## 2021-01-01 RX ORDER — METOPROLOL TARTRATE 50 MG
1 TABLET ORAL
Qty: 0 | Refills: 0 | DISCHARGE

## 2021-01-01 RX ORDER — PROPOFOL 10 MG/ML
10 INJECTION, EMULSION INTRAVENOUS
Qty: 1000 | Refills: 0 | Status: DISCONTINUED | OUTPATIENT
Start: 2021-01-01 | End: 2021-01-01

## 2021-01-01 RX ORDER — ACETAMINOPHEN 500 MG
650 TABLET ORAL EVERY 6 HOURS
Refills: 0 | Status: DISCONTINUED | OUTPATIENT
Start: 2021-01-01 | End: 2021-01-01

## 2021-01-01 RX ORDER — GABAPENTIN 400 MG/1
300 CAPSULE ORAL ONCE
Refills: 0 | Status: DISCONTINUED | OUTPATIENT
Start: 2021-01-01 | End: 2021-01-01

## 2021-01-01 RX ORDER — AZITHROMYCIN 500 MG/1
500 TABLET, FILM COATED ORAL EVERY 24 HOURS
Refills: 0 | Status: DISCONTINUED | OUTPATIENT
Start: 2021-01-01 | End: 2021-01-01

## 2021-01-01 RX ORDER — DEXTROSE MONOHYDRATE, SODIUM CHLORIDE, AND POTASSIUM CHLORIDE 50; .745; 4.5 G/1000ML; G/1000ML; G/1000ML
1000 INJECTION, SOLUTION INTRAVENOUS
Refills: 0 | Status: DISCONTINUED | OUTPATIENT
Start: 2021-01-01 | End: 2021-01-01

## 2021-01-01 RX ORDER — SODIUM BICARBONATE 1 MEQ/ML
50 SYRINGE (ML) INTRAVENOUS ONCE
Refills: 0 | Status: DISCONTINUED | OUTPATIENT
Start: 2021-01-01 | End: 2021-01-01

## 2021-01-01 RX ORDER — POTASSIUM CHLORIDE 20 MEQ
10 PACKET (EA) ORAL
Refills: 0 | Status: COMPLETED | OUTPATIENT
Start: 2021-01-01 | End: 2021-01-01

## 2021-01-01 RX ORDER — IPRATROPIUM/ALBUTEROL SULFATE 18-103MCG
3 AEROSOL WITH ADAPTER (GRAM) INHALATION EVERY 6 HOURS
Refills: 0 | Status: DISCONTINUED | OUTPATIENT
Start: 2021-01-01 | End: 2021-01-01

## 2021-01-01 RX ORDER — ALBUMIN HUMAN 25 %
250 VIAL (ML) INTRAVENOUS ONCE
Refills: 0 | Status: COMPLETED | OUTPATIENT
Start: 2021-01-01 | End: 2021-01-01

## 2021-01-01 RX ORDER — NOREPINEPHRINE BITARTRATE/D5W 8 MG/250ML
0.15 PLASTIC BAG, INJECTION (ML) INTRAVENOUS
Qty: 8 | Refills: 0 | Status: DISCONTINUED | OUTPATIENT
Start: 2021-01-01 | End: 2021-01-01

## 2021-01-01 RX ORDER — SODIUM BICARBONATE 1 MEQ/ML
100 SYRINGE (ML) INTRAVENOUS ONCE
Refills: 0 | Status: COMPLETED | OUTPATIENT
Start: 2021-01-01 | End: 2021-01-01

## 2021-01-01 RX ORDER — PANTOPRAZOLE SODIUM 20 MG/1
1 TABLET, DELAYED RELEASE ORAL
Qty: 0 | Refills: 0 | DISCHARGE

## 2021-01-01 RX ORDER — SODIUM CHLORIDE 9 MG/ML
1000 INJECTION INTRAMUSCULAR; INTRAVENOUS; SUBCUTANEOUS ONCE
Refills: 0 | Status: COMPLETED | OUTPATIENT
Start: 2021-01-01 | End: 2021-01-01

## 2021-01-01 RX ORDER — LACTOBACILLUS ACIDOPHILUS 100MM CELL
1 CAPSULE ORAL
Qty: 0 | Refills: 0 | DISCHARGE

## 2021-01-01 RX ORDER — CHOLECALCIFEROL (VITAMIN D3) 125 MCG
1000 CAPSULE ORAL DAILY
Refills: 0 | Status: DISCONTINUED | OUTPATIENT
Start: 2021-01-01 | End: 2021-01-01

## 2021-01-01 RX ORDER — CHLORHEXIDINE GLUCONATE 213 G/1000ML
15 SOLUTION TOPICAL EVERY 12 HOURS
Refills: 0 | Status: DISCONTINUED | OUTPATIENT
Start: 2021-01-01 | End: 2021-01-01

## 2021-01-01 RX ORDER — BENZOCAINE AND MENTHOL 5; 1 G/100ML; G/100ML
1 LIQUID ORAL
Refills: 0 | Status: DISCONTINUED | OUTPATIENT
Start: 2021-01-01 | End: 2021-01-01

## 2021-01-01 RX ORDER — ALPRAZOLAM 0.25 MG
0.25 TABLET ORAL ONCE
Refills: 0 | Status: DISCONTINUED | OUTPATIENT
Start: 2021-01-01 | End: 2021-01-01

## 2021-01-01 RX ORDER — SODIUM BICARBONATE 1 MEQ/ML
50 SYRINGE (ML) INTRAVENOUS ONCE
Refills: 0 | Status: COMPLETED | OUTPATIENT
Start: 2021-01-01 | End: 2021-01-01

## 2021-01-01 RX ORDER — MONTELUKAST 4 MG/1
1 TABLET, CHEWABLE ORAL
Qty: 0 | Refills: 0 | DISCHARGE

## 2021-01-01 RX ORDER — PIPERACILLIN AND TAZOBACTAM 4; .5 G/20ML; G/20ML
3.38 INJECTION, POWDER, LYOPHILIZED, FOR SOLUTION INTRAVENOUS ONCE
Refills: 0 | Status: COMPLETED | OUTPATIENT
Start: 2021-01-01 | End: 2021-01-01

## 2021-01-01 RX ORDER — PHENYLEPHRINE HYDROCHLORIDE 10 MG/ML
0.1 INJECTION INTRAVENOUS
Qty: 40 | Refills: 0 | Status: DISCONTINUED | OUTPATIENT
Start: 2021-01-01 | End: 2021-01-01

## 2021-01-01 RX ORDER — MIDAZOLAM HYDROCHLORIDE 1 MG/ML
4 INJECTION, SOLUTION INTRAMUSCULAR; INTRAVENOUS ONCE
Refills: 0 | Status: DISCONTINUED | OUTPATIENT
Start: 2021-01-01 | End: 2021-01-01

## 2021-01-01 RX ADMIN — Medication 100 MILLIEQUIVALENT(S): at 13:41

## 2021-01-01 RX ADMIN — SODIUM CHLORIDE 1000 MILLILITER(S): 9 INJECTION INTRAMUSCULAR; INTRAVENOUS; SUBCUTANEOUS at 16:18

## 2021-01-01 RX ADMIN — MINOCYCLINE HYDROCHLORIDE 100 MILLIGRAM(S): 45 TABLET, EXTENDED RELEASE ORAL at 17:29

## 2021-01-01 RX ADMIN — Medication 3 MILLIGRAM(S): at 03:52

## 2021-01-01 RX ADMIN — PANTOPRAZOLE SODIUM 40 MILLIGRAM(S): 20 TABLET, DELAYED RELEASE ORAL at 06:22

## 2021-01-01 RX ADMIN — ONDANSETRON 4 MILLIGRAM(S): 8 TABLET, FILM COATED ORAL at 16:18

## 2021-01-01 RX ADMIN — Medication 50 MILLILITER(S): at 12:21

## 2021-01-01 RX ADMIN — VASOPRESSIN 2.4 UNIT(S)/MIN: 20 INJECTION INTRAVENOUS at 20:41

## 2021-01-01 RX ADMIN — MIDAZOLAM HYDROCHLORIDE 4 MILLIGRAM(S): 1 INJECTION, SOLUTION INTRAMUSCULAR; INTRAVENOUS at 12:00

## 2021-01-01 RX ADMIN — Medication 100 MICROGRAM(S): at 06:22

## 2021-01-01 RX ADMIN — Medication 13.5 MICROGRAM(S)/KG/MIN: at 10:45

## 2021-01-01 RX ADMIN — Medication 1000 UNIT(S): at 11:00

## 2021-01-01 RX ADMIN — SODIUM CHLORIDE 2000 MILLILITER(S): 9 INJECTION, SOLUTION INTRAVENOUS at 08:58

## 2021-01-01 RX ADMIN — AMIODARONE HYDROCHLORIDE 618 MILLIGRAM(S): 400 TABLET ORAL at 01:00

## 2021-01-01 RX ADMIN — CISATRACURIUM BESYLATE 8.64 MICROGRAM(S)/KG/MIN: 2 INJECTION INTRAVENOUS at 23:36

## 2021-01-01 RX ADMIN — PROPOFOL 30 MILLIGRAM(S): 10 INJECTION, EMULSION INTRAVENOUS at 12:00

## 2021-01-01 RX ADMIN — Medication 1000 UNIT(S): at 15:01

## 2021-01-01 RX ADMIN — ENOXAPARIN SODIUM 40 MILLIGRAM(S): 100 INJECTION SUBCUTANEOUS at 11:00

## 2021-01-01 RX ADMIN — Medication 3 MILLILITER(S): at 11:47

## 2021-01-01 RX ADMIN — PIPERACILLIN AND TAZOBACTAM 200 GRAM(S): 4; .5 INJECTION, POWDER, LYOPHILIZED, FOR SOLUTION INTRAVENOUS at 13:24

## 2021-01-01 RX ADMIN — PANTOPRAZOLE SODIUM 40 MILLIGRAM(S): 20 TABLET, DELAYED RELEASE ORAL at 05:16

## 2021-01-01 RX ADMIN — Medication 100 MICROGRAM(S): at 05:16

## 2021-01-01 RX ADMIN — Medication 3 MILLILITER(S): at 20:08

## 2021-01-01 RX ADMIN — HEPARIN SODIUM 5000 UNIT(S): 5000 INJECTION INTRAVENOUS; SUBCUTANEOUS at 18:47

## 2021-01-01 RX ADMIN — SODIUM CHLORIDE 1000 MILLILITER(S): 9 INJECTION, SOLUTION INTRAVENOUS at 08:44

## 2021-01-01 RX ADMIN — KETAMINE HYDROCHLORIDE 1.2 MG/KG/HR: 100 INJECTION INTRAMUSCULAR; INTRAVENOUS at 12:20

## 2021-01-01 RX ADMIN — PIPERACILLIN AND TAZOBACTAM 25 GRAM(S): 4; .5 INJECTION, POWDER, LYOPHILIZED, FOR SOLUTION INTRAVENOUS at 22:14

## 2021-01-01 RX ADMIN — Medication 3 MILLILITER(S): at 00:50

## 2021-01-01 RX ADMIN — PHENYLEPHRINE HYDROCHLORIDE 1.8 MICROGRAM(S)/KG/MIN: 10 INJECTION INTRAVENOUS at 08:30

## 2021-01-01 RX ADMIN — PHENYLEPHRINE HYDROCHLORIDE 1.8 MICROGRAM(S)/KG/MIN: 10 INJECTION INTRAVENOUS at 03:56

## 2021-01-01 RX ADMIN — Medication 100 MILLIGRAM(S): at 14:05

## 2021-01-01 RX ADMIN — SODIUM CHLORIDE 100 MILLILITER(S): 9 INJECTION, SOLUTION INTRAVENOUS at 15:01

## 2021-01-01 RX ADMIN — Medication 100 MILLIGRAM(S): at 11:00

## 2021-01-01 RX ADMIN — Medication 3 MILLILITER(S): at 08:18

## 2021-01-01 RX ADMIN — PIPERACILLIN AND TAZOBACTAM 25 GRAM(S): 4; .5 INJECTION, POWDER, LYOPHILIZED, FOR SOLUTION INTRAVENOUS at 14:06

## 2021-01-01 RX ADMIN — ATORVASTATIN CALCIUM 10 MILLIGRAM(S): 80 TABLET, FILM COATED ORAL at 22:12

## 2021-01-01 RX ADMIN — Medication 250 MILLIGRAM(S): at 14:06

## 2021-01-01 RX ADMIN — Medication 100 MILLIEQUIVALENT(S): at 16:09

## 2021-01-01 RX ADMIN — PROPOFOL 2.88 MICROGRAM(S)/KG/MIN: 10 INJECTION, EMULSION INTRAVENOUS at 07:00

## 2021-01-01 RX ADMIN — DEXTROSE MONOHYDRATE, SODIUM CHLORIDE, AND POTASSIUM CHLORIDE 70 MILLILITER(S): 50; .745; 4.5 INJECTION, SOLUTION INTRAVENOUS at 22:10

## 2021-01-01 RX ADMIN — Medication 75 MEQ/KG/HR: at 09:58

## 2021-01-01 RX ADMIN — Medication 125 MILLILITER(S): at 10:10

## 2021-01-01 RX ADMIN — Medication 100 MILLIGRAM(S): at 04:07

## 2021-01-01 RX ADMIN — CHLORHEXIDINE GLUCONATE 15 MILLILITER(S): 213 SOLUTION TOPICAL at 05:16

## 2021-01-01 RX ADMIN — CHLORHEXIDINE GLUCONATE 15 MILLILITER(S): 213 SOLUTION TOPICAL at 18:47

## 2021-01-01 RX ADMIN — SODIUM CHLORIDE 1000 MILLILITER(S): 9 INJECTION INTRAMUSCULAR; INTRAVENOUS; SUBCUTANEOUS at 18:27

## 2021-01-01 RX ADMIN — Medication 13.5 MICROGRAM(S)/KG/MIN: at 08:53

## 2021-01-01 RX ADMIN — PROPOFOL 2.88 MICROGRAM(S)/KG/MIN: 10 INJECTION, EMULSION INTRAVENOUS at 12:39

## 2021-01-01 RX ADMIN — Medication 1 TABLET(S): at 11:00

## 2021-01-01 RX ADMIN — PIPERACILLIN AND TAZOBACTAM 25 GRAM(S): 4; .5 INJECTION, POWDER, LYOPHILIZED, FOR SOLUTION INTRAVENOUS at 21:03

## 2021-01-01 RX ADMIN — MINOCYCLINE HYDROCHLORIDE 100 MILLIGRAM(S): 45 TABLET, EXTENDED RELEASE ORAL at 06:22

## 2021-01-01 RX ADMIN — Medication 3 MILLILITER(S): at 14:09

## 2021-01-01 RX ADMIN — Medication 3 MILLILITER(S): at 09:50

## 2021-01-01 RX ADMIN — Medication 0.5 MILLIGRAM(S): at 09:50

## 2021-01-01 RX ADMIN — Medication 50 MILLIEQUIVALENT(S): at 03:13

## 2021-01-01 RX ADMIN — PHENYLEPHRINE HYDROCHLORIDE 1.8 MICROGRAM(S)/KG/MIN: 10 INJECTION INTRAVENOUS at 06:04

## 2021-01-01 RX ADMIN — Medication 100 MILLIEQUIVALENT(S): at 14:25

## 2021-01-01 RX ADMIN — Medication 400 MILLIGRAM(S): at 20:41

## 2021-01-01 RX ADMIN — PHENYLEPHRINE HYDROCHLORIDE 1.8 MICROGRAM(S)/KG/MIN: 10 INJECTION INTRAVENOUS at 12:35

## 2021-01-01 RX ADMIN — Medication 40 MILLIGRAM(S): at 20:50

## 2021-01-01 RX ADMIN — AZITHROMYCIN 255 MILLIGRAM(S): 500 TABLET, FILM COATED ORAL at 14:53

## 2021-01-01 RX ADMIN — Medication 81 MILLIGRAM(S): at 11:00

## 2021-01-01 RX ADMIN — SERTRALINE 100 MILLIGRAM(S): 25 TABLET, FILM COATED ORAL at 14:05

## 2021-01-01 RX ADMIN — ATORVASTATIN CALCIUM 10 MILLIGRAM(S): 80 TABLET, FILM COATED ORAL at 21:03

## 2021-01-01 RX ADMIN — PIPERACILLIN AND TAZOBACTAM 25 GRAM(S): 4; .5 INJECTION, POWDER, LYOPHILIZED, FOR SOLUTION INTRAVENOUS at 05:13

## 2021-01-01 RX ADMIN — Medication 1 TABLET(S): at 15:01

## 2021-01-01 RX ADMIN — FENTANYL CITRATE 2.4 MICROGRAM(S)/KG/HR: 50 INJECTION INTRAVENOUS at 23:33

## 2021-01-01 RX ADMIN — MILRINONE LACTATE 1.8 MICROGRAM(S)/KG/MIN: 1 INJECTION, SOLUTION INTRAVENOUS at 10:48

## 2021-01-01 RX ADMIN — CHLORHEXIDINE GLUCONATE 1 APPLICATION(S): 213 SOLUTION TOPICAL at 05:17

## 2021-01-01 RX ADMIN — HEPARIN SODIUM 5000 UNIT(S): 5000 INJECTION INTRAVENOUS; SUBCUTANEOUS at 05:16

## 2021-01-01 RX ADMIN — SERTRALINE 100 MILLIGRAM(S): 25 TABLET, FILM COATED ORAL at 11:00

## 2021-01-01 RX ADMIN — Medication 100 MILLIEQUIVALENT(S): at 10:00

## 2021-01-01 RX ADMIN — KETAMINE HYDROCHLORIDE 1.2 MG/KG/HR: 100 INJECTION INTRAMUSCULAR; INTRAVENOUS at 23:34

## 2021-01-01 RX ADMIN — PANTOPRAZOLE SODIUM 40 MILLIGRAM(S): 20 TABLET, DELAYED RELEASE ORAL at 16:18

## 2021-01-01 RX ADMIN — SODIUM CHLORIDE 500 MILLILITER(S): 9 INJECTION INTRAMUSCULAR; INTRAVENOUS; SUBCUTANEOUS at 19:45

## 2021-01-01 RX ADMIN — Medication 0.25 MILLIGRAM(S): at 18:14

## 2021-01-01 RX ADMIN — PIPERACILLIN AND TAZOBACTAM 25 GRAM(S): 4; .5 INJECTION, POWDER, LYOPHILIZED, FOR SOLUTION INTRAVENOUS at 05:17

## 2021-01-01 RX ADMIN — Medication 13.5 MICROGRAM(S)/KG/MIN: at 03:12

## 2021-01-01 RX ADMIN — VASOPRESSIN 2.4 UNIT(S)/MIN: 20 INJECTION INTRAVENOUS at 12:20

## 2021-01-01 RX ADMIN — Medication 1000 MILLIGRAM(S): at 21:00

## 2021-01-01 RX ADMIN — AZITHROMYCIN 255 MILLIGRAM(S): 500 TABLET, FILM COATED ORAL at 14:05

## 2021-01-01 RX ADMIN — PHENYLEPHRINE HYDROCHLORIDE 1.8 MICROGRAM(S)/KG/MIN: 10 INJECTION INTRAVENOUS at 10:15

## 2021-10-16 NOTE — ED PROVIDER NOTE - CLINICAL SUMMARY MEDICAL DECISION MAKING FREE TEXT BOX
Weakness, fatigue, nausea, vomiting and cough requiring evaluation, labs, imaging, ecg and IVFs and meds.

## 2021-10-16 NOTE — H&P ADULT - PROBLEM SELECTOR PLAN 5
Chronic, stable     - C/w synthroid 100mcg Chronic, stable   - C/w synthroid 100mcg Chronic, stable   - C/w synthroid 100mcg  - check TSH

## 2021-10-16 NOTE — H&P ADULT - NSHPREVIEWOFSYSTEMS_GEN_ALL_CORE
CONSTITUTIONAL: denies fever, chills, admits to fatigue and weakness  HEENT: denies blurred vision, sore throat  CARDIOVASCULAR: denies chest pain, chest pressure, palpitations  RESPIRATORY: admits to shortness of breath, denies sputum production  GASTROINTESTINAL: admits to nausea and vomiting, denies diarrhea, abdominal pain  GENITOURINARY: denies dysuria, discharge  NEUROLOGICAL: denies numbness, headache, focal weakness  MUSCULOSKELETAL: denies new joint pain, admits to muscle aches  HEMATOLOGIC: denies gross bleeding, bruising  LYMPHATICS: denies enlarged lymph nodes, extremity swelling CONSTITUTIONAL: denies fever, chills; admits to fatigue and weakness  HEENT: denies blurred vision, sore throat  CARDIOVASCULAR: denies chest pain, chest pressure, palpitations  RESPIRATORY: admits to shortness of breath; denies sputum production  GASTROINTESTINAL: admits to nausea and vomiting; denies diarrhea, abdominal pain  GENITOURINARY: denies dysuria, discharge  NEUROLOGICAL: denies numbness, headache, focal weakness  MUSCULOSKELETAL: denies new joint pain; admits to muscle aches  HEMATOLOGIC: denies gross bleeding, bruising  LYMPHATICS: denies enlarged lymph nodes; admits extremity swelling

## 2021-10-16 NOTE — ED PROVIDER NOTE - PROGRESS NOTE DETAILS
pt reassessed, pt states bp normally 100 systolic, denies dizziness,  sat good on o2, d/w dr edmond. ok to finish ivf for elevated lactate and repeat lactate, admit tele

## 2021-10-16 NOTE — H&P ADULT - ATTENDING COMMENTS
76 y/o F with PMHx of HTN, HLD, PVD s/p right AKA 2010, former smoker, depression, hypothyroidism, GERD, hx of MRSA infection, hx of TB presented to the ED complaining of weakness was found to be in respiratory distress w/ O2 sat of 88%. CT chest performed in ED showed presence of new lung mass in left lower lobe suspicious for neoplasm w/ moderate left pleural effusion. Patient admitted for weakness and hypoxia with left lung mass-like opacities, rule out malignancy +/- superimposed pneumonia. Admit to medicine. O2 supplementation as needed. Given levaquin for PNA coverage - will hold this for now until ID eval (covered for 24h) for possible empiric antimicrobial coverage. Follow up [facility]. Left lung mass is suspicious for malignancy. Given her smoking history, unintentional weight loss, lack of screening, CT findings and symptoms - this corresponds with the most likely diagnosis. Check LLE US dopplers to rule out DVT given pitting edema and mild tenderness. Evaluate for diagnostic thoracentesis for suspected malignant effusion (loculated). Born in [place], immigrated many years ago - does have a history of tuberculosis per HIE review. Nutrition consult. ID Dr. Hyde consulted. Pulmonology Dr. Porter consulted. Discussed with patient and  (Dylon) at bedside. Discussed with daughter Mandy (from out of state) via telephone per patient and family request. All results reviewed, questions answered to satisfaction.    Agree with H&P as outlined above, edited where appropriate.

## 2021-10-16 NOTE — ED PROVIDER NOTE - OBJECTIVE STATEMENT
74 yo white female with H/O HTN, HLD, PVD and Right AKA who over the past few weeks has been experiencing gradual but increasing weakness, fatigue, nausea, occasional post prandial vomiting and slight cough. In addition patient has been noting weight loss. No fever or chills. No abdominal pains.

## 2021-10-16 NOTE — H&P ADULT - NSHPPHYSICALEXAM_GEN_ALL_CORE
T(C): 37.3 (10-16-21 @ 15:30), Max: 37.3 (10-16-21 @ 15:30)  HR: 82 (10-16-21 @ 18:26) (82 - 94)  BP: 92/53 (10-16-21 @ 19:09) (79/45 - 92/53)  RR: 18 (10-16-21 @ 18:26) (18 - 19)  SpO2: 99% (10-16-21 @ 18:26) (91% - 99%)    GENERAL: thin female w/ ashen appearance, no acute distress, appropriate  EYES: sclera clear, no exudates  ENMT: oropharynx clear without erythema, no exudates, moist mucous membranes  NECK: supple, soft, no thyromegaly noted  LUNGS: good air entry bilaterally, crackles in poster left lower bases, visible increased work of breathing   HEART: soft S1/S2, regular rate and rhythm, no murmurs noted, no lower extremity edema  GASTROINTESTINAL: abdomen is soft, nontender, nondistended, normoactive bowel sounds, hernia appreciated in RLQ   INTEGUMENT: ashen/dusky appearance of face  MUSCULOSKELETAL: no clubbing or cyanosis, Right AKA   NEUROLOGIC: awake, alert, oriented x3, reduced muscle tone in 3 extremities, no obvious sensory deficits  HEME/LYMPH: no palpable supraclavicular nodules, no obvious ecchymosis or petechiae T(C): 37.3 (10-16-21 @ 15:30), Max: 37.3 (10-16-21 @ 15:30)  HR: 82 (10-16-21 @ 18:26) (82 - 94)  BP: 92/53 (10-16-21 @ 19:09) (79/45 - 92/53)  RR: 18 (10-16-21 @ 18:26) (18 - 19)  SpO2: 99% (10-16-21 @ 18:26) (91% - 99%)    GENERAL: thin female w/ ashen appearance, no acute distress, appropriate  EYES: sclera clear, no exudates  ENMT: oropharynx clear without erythema, no exudates, moist mucous membranes  NECK: supple, soft, no thyromegaly noted  LUNGS: good air entry bilaterally, crackles in poster left lower bases, visible increased work of breathing   HEART: soft S1/S2, regular rate and rhythm, no murmurs noted, no lower extremity edema  GASTROINTESTINAL: abdomen is soft, nontender, nondistended, normoactive bowel sounds, hernia appreciated in RLQ   INTEGUMENT: ashen/dusky appearance of face  MUSCULOSKELETAL: no clubbing or cyanosis, s/p Right AKA, LLE with pitting edema and mild tenderness to palpation  NEUROLOGIC: awake, alert, oriented x3, reduced muscle tone in 3 extremities, no obvious sensory deficits  HEME/LYMPH: no palpable supraclavicular nodules, no obvious ecchymosis or petechiae

## 2021-10-16 NOTE — ED ADULT TRIAGE NOTE - CHIEF COMPLAINT QUOTE
A user error has taken place: encounter opened in error, closed for administrative reasons. Pt p/w generalized weakness x 3 days w/ decreased o2 sat on EMS arrival Per EMS pt was 88% on room air w/

## 2021-10-16 NOTE — H&P ADULT - NSHPSOCIALHISTORY_GEN_ALL_CORE
, lives at home with   Former smoker, quit in 2010, has been a smoker since she was 20, ~30 years   Drinks a little wine with dinner every now and then   No recreational drug use

## 2021-10-16 NOTE — H&P ADULT - PROBLEM SELECTOR PLAN 7
Chronic stable   Patient was hypotensive in ED, BP 88/57     - C/w Toprol 25mg w/ holding parameters   - C/w nifedipine 30mg w/ holding parameters Chronic stable   Patient was hypotensive in ED, BP 88/57   - C/w Toprol 25mg w/ holding parameters   - C/w nifedipine 30mg w/ holding parameters Hx of HTN, hypotensive on arrival)  Patient was hypotensive in ED, BP 88/57   - hold Toprol 25mg qd  - hold nifedipine 30mg qd (takes for vasodilator effects)

## 2021-10-16 NOTE — ED ADULT NURSE NOTE - NSICDXPASTSURGICALHX_GEN_ALL_CORE_FT
PAST SURGICAL HISTORY:  H/O shoulder surgery     H/O: hysterectomy     History of total hip replacement     S/P AKA (above knee amputation), right

## 2021-10-16 NOTE — H&P ADULT - PROBLEM SELECTOR PLAN 6
Acute, BP was low in the ED 88/57     - IVF LR at 75 cc/hr   - Hold BP meds Acute, BP was low in the ED 88/57   - IVF LR at 75 cc/hr   - Hold BP meds Acute, BP was low in the ED 88/57   - IVF LR at 75 cc/hr   - Hold BP meds - Metoprolol and Procardia  - monitor hemodynamics

## 2021-10-16 NOTE — H&P ADULT - NSICDXPASTSURGICALHX_GEN_ALL_CORE_FT
PAST SURGICAL HISTORY:  H/O shoulder surgery     History of total hip replacement     S/P AKA (above knee amputation), right      PAST SURGICAL HISTORY:  H/O shoulder surgery     H/O: hysterectomy     History of total hip replacement     S/P AKA (above knee amputation), right

## 2021-10-16 NOTE — H&P ADULT - NSHPLABSRESULTS_GEN_ALL_CORE
Labs, Imaging and EKG all personally reviewed by the attending physician. .  < from: CT Chest No Cont (10.16.21 @ 17:14) >      EXAM:  CT ABDOMEN AND PELVIS                          EXAM:  CT CHEST                            PROCEDURE DATE:  10/16/2021          INTERPRETATION:  CLINICAL INFORMATION: cough    COMPARISON: None.    CONTRAST/COMPLICATIONS:  IV Contrast: NONE  0cc administered   0 cc discarded  Oral Contrast: NONE  Complications: None reported at time of study completion    PROCEDURE:  CT of the Chest, Abdomen and Pelvis was performed.  Sagittal and coronal reformats were performed.    FINDINGS:  CHEST:  LUNGS AND LARGE AIRWAYS: Patent central airways. Groundglass opacities in the right lung. There is left lower lobe opacity measuring 3.8 x 2.8 cm in image 43 series 2. Emphysema. There is also a 2.3 x 3.3 cm left lower lobe medial opacity abutting the pericardium on series 2 image 38.  PLEURA: Moderate loculated left pleural effusion. Nodular left-sided pleural thickening is noted. Trace right pleural effusion.  VESSELS: Axillofemoral bypass noted.  HEART: Heart size is normal.  No pericardial effusion.  MEDIASTINUM AND ISAIAH: No lymphadenopathy.  CHEST WALL AND LOWER NECK: Within normal limits.    ABDOMEN AND PELVIS:  LIVER: Within normal limits.  BILE DUCTS: Normal caliber.  GALLBLADDER: Question of a contracted gallbladder.  SPLEEN: Within normal limits.  PANCREAS: Within normal limits.  ADRENALS: Within normal limits.  KIDNEYS/URETERS: Within normal limits.    BLADDER: Within normal limits.  REPRODUCTIVE ORGANS: Within normal limits.    BOWEL: No bowel obstruction.  PERITONEUM: No ascites.  VESSELS:  Within normal limits.  RETROPERITONEUM/LYMPH NODES: Prominent nondilated millimeter gastrohepatic node on series 2 image 56. Technique a 6 mm periceliac node on image 61.  ABDOMINAL WALL: Large right-sided ventral hernia containing small and large bowel extending into the right inguinal canal.  BONES: Bilateral hip arthroplasty.    IMPRESSION: Left lower lobe masslike opacities suspicious for neoplasm.    Nodular left-sided pleural thickening highly suspicious for neoplastic disease.    Moderate loculated left pleural effusion.    Prominent gastrohepatic and celiac lymph nodes.    Large right lower quadrant hernia extending into the inguinal canal.    --- End of Report ---    LUPE NORTON MD; Attending Radiologist  This document has been electronically signed. Oct 16 2021  5:59PM    < end of copied text >    Labs, Imaging and EKG all personally reviewed by the attending physician.

## 2021-10-16 NOTE — ED ADULT NURSE NOTE - NSICDXPASTMEDICALHX_GEN_ALL_CORE_FT
PAST MEDICAL HISTORY:  Esophageal reflux     History of depression     History of methicillin resistant staphylococcus aureus (MRSA)     History of tuberculosis     HLD (hyperlipidemia)     Hypertension     Hypothyroidism     Peripheral vascular disease

## 2021-10-16 NOTE — H&P ADULT - NSICDXPASTMEDICALHX_GEN_ALL_CORE_FT
PAST MEDICAL HISTORY:  Esophageal reflux     History of depression     History of methicillin resistant staphylococcus aureus (MRSA)     History of tuberculosis     HLD (hyperlipidemia)     Hypertension     Hypothyroidism     Peripheral vascular disease      PAST MEDICAL HISTORY:  Esophageal reflux     History of depression     History of methicillin resistant staphylococcus aureus (MRSA)     History of tuberculosis     HLD (hyperlipidemia)     Hypertension     Hypothyroidism     Peripheral vascular disease     Peripheral vascular disease

## 2021-10-16 NOTE — H&P ADULT - PROBLEM SELECTOR PLAN 4
Chronic, stable     - C/w Pantoprazole 40mg  - C/w Famotidine 40mg, consider stopping if patient has difficulty tolerating Chronic, stable   - C/w Pantoprazole 40mg  - C/w Famotidine 40mg, consider stopping if patient has difficulty tolerating Chronic, stable   - C/w Pantoprazole 40mg

## 2021-10-16 NOTE — H&P ADULT - PROBLEM SELECTOR PLAN 1
CT Chest:  Groundglass opacities in the right lung. There is left lower lobe opacity measuring 3.8 x 2.8 cm Emphysema. There is also a 2.3 x 3.3 cm left lower lobe medial opacity abutting the pericardium Moderate loculated left pleural effusion. Nodular left-sided pleural thickening is noted. Trace right pleural effusion.    - C/w Levaquin 500mg  - ID consult, f/u recs   - Pulm consult Dr. Porter, f/u recs CT Chest: Groundglass opacities in the right lung. There is left lower lobe opacity measuring 3.8 x 2.8 cm. Emphysema. There is also a 2.3 x 3.3 cm left lower lobe medial opacity abutting the pericardium. Moderate loculated left pleural effusion. Nodular left-sided pleural thickening is noted. Trace right pleural effusion.  - With acute hypoxemic respiratory failure (88% with EMS, 91% on admission) - O2 supplementation as needed  - Has Weakness - follow up TSH, B12, folate, C-reactive protein, CEA. Get Nutrition consult.  - Given smoking hx, weight loss, weakness - suspicion that this is malignancy  - Loculated Pleural effusion (from OP records there is a history of TB) - evaluate for diagnostic thoracentesis for pleural analysis of suspected malignant effusion. Pulm Consult (Dr. Porter)  - Given Levaquin 500mg in ED (covered for 24h) - will defer further abx to ID  - lactate was elevated (normalized s/p IVF), PCT elevated  - Follow up Blood Cx, MRSA PCR, urinary ag  - ID consult (Dr. Hyde), f/u recs re: role for continued antibiotics

## 2021-10-16 NOTE — H&P ADULT - HISTORY OF PRESENT ILLNESS
74 y/o F with PMHx of HTN, HLD, PVD s/p right AKA, former smoker, depression, hypothyroidism, GERD, hx of MRSA infection, hx of TB presented to the ED complaining of weakness. Brought in by EMS and was noted to have a room air O2 sat of 88%.    In the ED,  Vital Signs Last 24 Hrs T(F)Max: 99.1 HR: 82-94 BP: 79/45-88/57 RR: 18 SpO2: 91%  Labs significant for: WBC 15.66, Alk Phos 211, AST 67, PCT 3.85, Lactate 2.5, proBNP 613  CXR: Left sided infiltrate. Bilateral patchiness.  CT CAP: Ground Glass Opacities in right lung. Left lower lobe masslike opacities suspicious for neoplasm. Nodular left-sided pleural thickening highly suspicious for neoplastic disease. Moderate loculated left pleural effusion. Prominent gastrohepatic and celiac lymph nodes. Large right lower quadrant hernia extending into the inguinal canal.  EKG: NSR at 90bpm 76 y/o F with PMHx of HTN, HLD, PVD s/p right AKA 2010, former smoker, depression, hypothyroidism, GERD, hx of MRSA infection, hx of TB presented to the ED complaining of weakness. Brought in by EMS and was noted to have a room air O2 sat of 88%. Patient states that for the past 3-4 weeks, she has been feeling weaker than usual, has had less of an appetite, and has been vomiting. Patient also states she has been having increased difficulty w/ breathing, primarily when she attempts to move around. As per patient, she came to the ED today because her symptoms were not resolving on their own. Patient lives at home with her  and ambulates with a walker. She has at least a 30 pack year history of smoking, quit in 2010.      In the ED,  Vital Signs Last 24 Hrs T(F)Max: 99.1 HR: 82-94 BP: 79/45-88/57 RR: 18 SpO2: 91%  Labs significant for: WBC 15.66, Alk Phos 211, AST 67, PCT 3.85, Lactate 2.5, proBNP 613  CXR: Left sided infiltrate. Bilateral patchiness.  CT CAP: Ground Glass Opacities in right lung. Left lower lobe masslike opacities suspicious for neoplasm. Nodular left-sided pleural thickening highly suspicious for neoplastic disease. Moderate loculated left pleural effusion. Prominent gastrohepatic and celiac lymph nodes. Large right lower quadrant hernia extending into the inguinal canal.  EKG: NSR at 90bpm 74 y/o F with PMHx of HTN, HLD, PVD s/p right AKA 2010, former smoker, depression, hypothyroidism, GERD, hx of MRSA infection, hx of TB presented to the ED complaining of weakness. Brought in by EMS and was noted to have a room air O2 sat of 88%. Patient states that for the past 3-4 weeks, she has been feeling weaker than usual, has had less of an appetite, and has been vomiting. Patient also states she has been having increased difficulty w/ breathing, primarily when she attempts to move around. As per patient, she came to the ED today because her symptoms were not resolving on their own. Patient lives at home with her  and ambulates with a walker. She has at least a 30 pack year history of smoking, quit in 2010. As per patient's daughter, patient has no known family history of cancer.     In the ED,  Vital Signs Last 24 Hrs T(F)Max: 99.1 HR: 82-94 BP: 79/45-88/57 RR: 18 SpO2: 91%  Labs significant for: WBC 15.66, Alk Phos 211, AST 67, PCT 3.85, Lactate 2.5, proBNP 613  CXR: Left sided infiltrate. Bilateral patchiness.  CT CAP: Ground Glass Opacities in right lung. Left lower lobe masslike opacities suspicious for neoplasm. Nodular left-sided pleural thickening highly suspicious for neoplastic disease. Moderate loculated left pleural effusion. Prominent gastrohepatic and celiac lymph nodes. Large right lower quadrant hernia extending into the inguinal canal.  EKG: NSR at 90bpm 76 y/o F with PMHx of HTN, HLD, ABDOULAYE s/p stent, PVD s/p right AKA , former smoker, depression, hypothyroidism, GERD, hx of MRSA infection, hx of TB presented to the ED complaining of weakness. Brought in by EMS and was noted to have a room air O2 sat of 88%. Patient states that for the past 3-4 weeks, she has been feeling weaker than usual, has had less of an appetite, and has been vomiting. Patient also states she has been having increased difficulty w/ breathing, primarily when she attempts to move around. As per patient, she came to the ED today because her symptoms were not resolving on their own. Patient lives at home with her  and ambulates with a walker. She has at least a 30 pack year history of smoking, quit in . She stopped her aspirin 1 week ago on her own due to GI symptoms. Her mother  at 47 years old in [place] from an unknown cause. Her brother  at 44 years old from a heart attack. As per patient's daughter, patient has no known family history of cancer. Does note that her leg has become more swollen. Her ashen skin complexion has been the same for the past 11 years.    In the ED,  Vital Signs Last 24 Hrs T(F)Max: 99.1 HR: 82-94 BP: 79/45-88/57 RR: 18 SpO2: 91%  Labs significant for: WBC 15.66, Alk Phos 211, AST 67, PCT 3.85, Lactate 2.5, proBNP 613  CXR: Left sided infiltrate. Bilateral patchiness.  CT CAP: Ground Glass Opacities in right lung. Left lower lobe masslike opacities suspicious for neoplasm. Nodular left-sided pleural thickening highly suspicious for neoplastic disease. Moderate loculated left pleural effusion. Prominent gastrohepatic and celiac lymph nodes. Large right lower quadrant hernia extending into the inguinal canal.  EKG: NSR at 90bpm

## 2021-10-16 NOTE — ED PROVIDER NOTE - GASTROINTESTINAL, MLM
Abdomen soft, non-tender, no guarding. Multiple old surgical scars and hernias right side of abdomen

## 2021-10-16 NOTE — H&P ADULT - NSICDXFAMILYHX_GEN_ALL_CORE_FT
FAMILY HISTORY:  Sibling  Still living? Unknown  Family history of MI (myocardial infarction), Age at diagnosis: Age Unknown  Family history of valvular heart disease, Age at diagnosis: Age Unknown     FAMILY HISTORY:  Mother  Still living? Unknown  Family history of early death, Age at diagnosis: Age Unknown    Sibling  Still living? Unknown  Family history of early death, Age at diagnosis: Age Unknown  Family history of MI (myocardial infarction), Age at diagnosis: Age Unknown  Family history of valvular heart disease, Age at diagnosis: Age Unknown

## 2021-10-16 NOTE — ED ADULT NURSE NOTE - OBJECTIVE STATEMENT
Pt. received alert and oriented x3 with chief complaint of generalized weakness w/ SOB. Pt. presents w/ stage 1 pressure ulcer to sacral area and right sided Above knee amputation.

## 2021-10-16 NOTE — H&P ADULT - ASSESSMENT
74 y/o F with PMHx of HTN, HLD, PVD s/p right AKA 2010, former smoker, depression, hypothyroidism, GERD, hx of MRSA infection, hx of TB presented to the ED complaining of weakness was found to be in respiratory distress w/ O2 sat of 88%. CT chest performed in ED showed presence of new lung mass in left lower lobe suspicious for neoplasm w/ left pleural effusion. Patient admitted for lung mass, rule out malignancy vs. pneumonia.  76 y/o F with PMHx of HTN, HLD, PVD s/p right AKA 2010, former smoker, depression, hypothyroidism, GERD, hx of MRSA infection, hx of TB presented to the ED complaining of weakness was found to be in respiratory distress w/ O2 sat of 88%. CT chest performed in ED showed presence of new lung mass in left lower lobe suspicious for neoplasm w/ moderate left pleural effusion. Patient admitted for weakness and hypoxia with left lung mass-like opacities, rule out malignancy +/- superimposed pneumonia.

## 2021-10-16 NOTE — ED PROVIDER NOTE - CONSTITUTIONAL, MLM
Chronically ill appearing thin, emaciated white female with grayish complexion (old per patient and son/) normal...

## 2021-10-16 NOTE — H&P ADULT - PROBLEM SELECTOR PLAN 2
Chronic, stable     - C/w lipitor   - C/w gabapentin PRN for phantom limb pain Chronic, stable   - C/w lipitor   - C/w gabapentin PRN for phantom limb pain Chronic, stable   - C/w asa + lipitor   - C/w gabapentin PRN for phantom limb pain  - hold nifedipine

## 2021-10-16 NOTE — ED ADULT NURSE NOTE - CHIEF COMPLAINT QUOTE
Pt p/w generalized weakness x 3 days w/ decreased o2 sat on EMS arrival Per EMS pt was 88% on room air w/

## 2021-10-17 NOTE — PROGRESS NOTE ADULT - PROBLEM SELECTOR PLAN 3
Pt afebrile, with worsening SOB and cough. Now with worsening leukocytosis with left shift, elevated pro-renita.  - CXR: Small to moderate left pleural effusion with patchy opacities within both lungs compatible with airspace disease that is likely pulmonary edema.   - s/p Levaquin 500 mg x1 in ED   - Continue with Zosyn and Azithro per ID rec   - f/u blood cultures, MRSA PCR. urinary ag   - Monitor WBC and fever trend   - Tylenol PRN for fever   - Cepacol PRN for cough  - ID consult (Dr. Ruth), recs appreciated Hx of HTN, on antihypertensives at home   - BP hypotensive on arrival 88/57, and remains in hypotensive range   - continue IVF D5 + NS +KCl 20 mEq at 70 cc/hr   - hold home Toprol 25mg qd  - hold home nifedipine 30mg qd (takes for vasodilator effects)  - monitor hemodynamics

## 2021-10-17 NOTE — DISCHARGE NOTE PROVIDER - NSDCMRMEDTOKEN_GEN_ALL_CORE_FT
Acidophilus oral tablet: 1 tab(s) orally once a day  allopurinol 100 mg oral tablet: 1 tab(s) orally once a day  Aspirin Enteric Coated 81 mg oral delayed release tablet: 1 tab(s) orally once a day  gabapentin 300 mg oral tablet: 1 tab(s) orally once a day, As Needed for pain  levothyroxine 100 mcg (0.1 mg) oral tablet: 1 tab(s) orally once a day  Lipitor 10 mg oral tablet: 1 tab(s) orally once a day  minocycline 100 mg oral tablet: 1 tab(s) orally every 12 hours  pantoprazole 40 mg oral delayed release tablet: 1 tab(s) orally once a day  Procardia XL 30 mg oral tablet, extended release: 1 tab(s) orally every other day  Singulair 10 mg oral tablet: 1 tab(s) orally once a day, As Needed  Toprol-XL 25 mg oral tablet, extended release: 1 tab(s) orally once a day  Vitamin D3 25 mcg (1000 intl units) oral tablet: 1 tab(s) orally once a day  Zoloft 100 mg oral tablet: 1 tab(s) orally once a day

## 2021-10-17 NOTE — PROGRESS NOTE ADULT - SUBJECTIVE AND OBJECTIVE BOX
Patient is a 75y old  Female who presents with a chief complaint of weakness, hypoxia (17 Oct 2021 09:12)      INTERVAL HPI/OVERNIGHT EVENTS: No overnight events. Patient seen and examined at bedside. Patient endorsing some nausea and decreased appetite but no vomiting. States that breathing is comfortable on NC but cough noted. Denies fevers, chills, headache, lightheadedness, chest pain, abdominal pain, diarrhea, or constipation.    Tele: sinus tachycardia, HR 120s     MEDICATIONS  (STANDING):  albuterol/ipratropium for Nebulization 3 milliLiter(s) Nebulizer every 6 hours  allopurinol 100 milliGRAM(s) Oral daily  aspirin enteric coated 81 milliGRAM(s) Oral daily  atorvastatin 10 milliGRAM(s) Oral at bedtime  azithromycin  IVPB 500 milliGRAM(s) IV Intermittent every 24 hours  cholecalciferol 1000 Unit(s) Oral daily  dextrose 5% + sodium chloride 0.45% with potassium chloride 20 mEq/L 1000 milliLiter(s) (70 mL/Hr) IV Continuous <Continuous>  enoxaparin Injectable 40 milliGRAM(s) SubCutaneous daily  influenza   Vaccine 0.5 milliLiter(s) IntraMuscular once  lactated ringers. 1000 milliLiter(s) (75 mL/Hr) IV Continuous <Continuous>  lactobacillus acidophilus 1 Tablet(s) Oral daily  levothyroxine 100 MICROGram(s) Oral daily  minocycline 100 milliGRAM(s) Oral two times a day  pantoprazole    Tablet 40 milliGRAM(s) Oral before breakfast  piperacillin/tazobactam IVPB.. 3.375 Gram(s) IV Intermittent every 8 hours  potassium chloride  10 mEq/100 mL IVPB 10 milliEquivalent(s) IV Intermittent every 1 hour  sertraline 100 milliGRAM(s) Oral daily    MEDICATIONS  (PRN):  acetaminophen   Tablet .. 650 milliGRAM(s) Oral every 6 hours PRN Temp greater or equal to 38C (100.4F), Mild Pain (1 - 3)  aluminum hydroxide/magnesium hydroxide/simethicone Suspension 30 milliLiter(s) Oral every 4 hours PRN Dyspepsia  gabapentin 300 milliGRAM(s) Oral once PRN Phantom pain  melatonin 3 milliGRAM(s) Oral at bedtime PRN Insomnia  montelukast 10 milliGRAM(s) Oral daily PRN allergies  ondansetron Injectable 4 milliGRAM(s) IV Push every 8 hours PRN Nausea and/or Vomiting      Allergies    No Known Allergies    Intolerances        REVIEW OF SYSTEMS:  CONSTITUTIONAL: +DECREASED APPETITE; 20LB WEIGHT LOSS; No fever or chills  HEENT:  No headache, no sore throat  RESPIRATORY: +COUGH, SOB (improved on NC); No wheezing, or shortness of breath  CARDIOVASCULAR: No chest pain, palpitations  GASTROINTESTINAL: +NAUSEA; No abd pain, vomiting, or diarrhea  GENITOURINARY: No dysuria, frequency, or hematuria  NEUROLOGICAL: no focal weakness or dizziness  MUSCULOSKELETAL: no myalgias     Vital Signs Last 24 Hrs  T(C): 37.1 (17 Oct 2021 11:23), Max: 37.3 (16 Oct 2021 15:30)  T(F): 98.7 (17 Oct 2021 11:23), Max: 99.1 (16 Oct 2021 15:30)  HR: 110 (17 Oct 2021 11:49) (82 - 118)  BP: 90/55 (17 Oct 2021 11:23) (78/51 - 109/58)  BP(mean): --  RR: 19 (17 Oct 2021 11:23) (17 - 19)  SpO2: 90% (17 Oct 2021 11:49) (90% - 99%)    PHYSICAL EXAM:  GENERAL: not in acute distress at rest on 2LNC   HEENT:  anicteric, moist mucous membranes  CHEST/LUNG: CTAb/l, no rales, wheezes, or rhonchi  HEART:  tachycardic, regular rhythm, S1, S2  ABDOMEN:  BS+, soft, nontender, nondistended; +RLQ hernia  EXTREMITIES: no edema, cyanosis, or calf tenderness; Right AKA; no LLE edema and TTP noted  SKIN: ashen/dusky appearance of face related to chronic minocycline use   NERVOUS SYSTEM: answers questions and follows commands appropriately    LABS:                        11.3   20.16 )-----------( 390      ( 17 Oct 2021 10:57 )             37.5     CBC Full  -  ( 17 Oct 2021 10:57 )  WBC Count : 20.16 K/uL  Hemoglobin : 11.3 g/dL  Hematocrit : 37.5 %  Platelet Count - Automated : 390 K/uL  Mean Cell Volume : 85.8 fl  Mean Cell Hemoglobin : 25.9 pg  Mean Cell Hemoglobin Concentration : 30.1 gm/dL  Auto Neutrophil # : 16.30 K/uL  Auto Lymphocyte # : 2.05 K/uL  Auto Monocyte # : 1.62 K/uL  Auto Eosinophil # : 0.01 K/uL  Auto Basophil # : 0.04 K/uL  Auto Neutrophil % : 80.9 %  Auto Lymphocyte % : 10.2 %  Auto Monocyte % : 8.0 %  Auto Eosinophil % : 0.0 %  Auto Basophil % : 0.2 %    17 Oct 2021 10:57    148    |  113    |  10     ----------------------------<  97     2.9     |  23     |  0.38     Ca    7.5        17 Oct 2021 10:57  Phos  3.2       17 Oct 2021 10:57  Mg     1.9       17 Oct 2021 10:57    TPro  4.9    /  Alb  1.7    /  TBili  0.5    /  DBili  x      /  AST  62     /  ALT  31     /  AlkPhos  197    17 Oct 2021 10:57        CAPILLARY BLOOD GLUCOSE              RADIOLOGY & ADDITIONAL TESTS:    Personally reviewed.     Consultant(s) Notes Reviewed:  [x] YES  [ ] NO     Patient is a 75y old  Female who presents with a chief complaint of weakness, SOB.      INTERVAL HPI/OVERNIGHT EVENTS: Patient seen and examined at bedside. Patient endorsing some nausea and decreased appetite but no vomiting. Pt was desaturating to mid-80s on NC so transitioned to venti-mask at 50% this morning. Pt states that breathing is comfortable on venti-mask, but cough noted. Denies fevers, chills, headache, lightheadedness, chest pain, abdominal pain.    Tele: sinus tachycardia, HR 120s     MEDICATIONS  (STANDING):  albuterol/ipratropium for Nebulization 3 milliLiter(s) Nebulizer every 6 hours  allopurinol 100 milliGRAM(s) Oral daily  aspirin enteric coated 81 milliGRAM(s) Oral daily  atorvastatin 10 milliGRAM(s) Oral at bedtime  azithromycin  IVPB 500 milliGRAM(s) IV Intermittent every 24 hours  cholecalciferol 1000 Unit(s) Oral daily  dextrose 5% + sodium chloride 0.45% with potassium chloride 20 mEq/L 1000 milliLiter(s) (70 mL/Hr) IV Continuous <Continuous>  enoxaparin Injectable 40 milliGRAM(s) SubCutaneous daily  influenza   Vaccine 0.5 milliLiter(s) IntraMuscular once  lactated ringers. 1000 milliLiter(s) (75 mL/Hr) IV Continuous <Continuous>  lactobacillus acidophilus 1 Tablet(s) Oral daily  levothyroxine 100 MICROGram(s) Oral daily  minocycline 100 milliGRAM(s) Oral two times a day  pantoprazole    Tablet 40 milliGRAM(s) Oral before breakfast  piperacillin/tazobactam IVPB.. 3.375 Gram(s) IV Intermittent every 8 hours  potassium chloride  10 mEq/100 mL IVPB 10 milliEquivalent(s) IV Intermittent every 1 hour  sertraline 100 milliGRAM(s) Oral daily    MEDICATIONS  (PRN):  acetaminophen   Tablet .. 650 milliGRAM(s) Oral every 6 hours PRN Temp greater or equal to 38C (100.4F), Mild Pain (1 - 3)  aluminum hydroxide/magnesium hydroxide/simethicone Suspension 30 milliLiter(s) Oral every 4 hours PRN Dyspepsia  gabapentin 300 milliGRAM(s) Oral once PRN Phantom pain  melatonin 3 milliGRAM(s) Oral at bedtime PRN Insomnia  montelukast 10 milliGRAM(s) Oral daily PRN allergies  ondansetron Injectable 4 milliGRAM(s) IV Push every 8 hours PRN Nausea and/or Vomiting      Allergies    No Known Allergies    Intolerances        REVIEW OF SYSTEMS:  CONSTITUTIONAL: +DECREASED APPETITE; 20LB WEIGHT LOSS; No fever or chills  HEENT:  No headache, no sore throat  RESPIRATORY: +COUGH, SOB (improved on venti-mask); No wheezing   CARDIOVASCULAR: No chest pain, palpitations  GASTROINTESTINAL: +mild nausea ocasionally; No abd pain, vomiting, or diarrhea  GENITOURINARY: No dysuria, frequency, or hematuria  NEUROLOGICAL: no focal weakness or dizziness  MUSCULOSKELETAL: no myalgias     Vital Signs Last 24 Hrs  T(C): 37.1 (17 Oct 2021 11:23), Max: 37.3 (16 Oct 2021 15:30)  T(F): 98.7 (17 Oct 2021 11:23), Max: 99.1 (16 Oct 2021 15:30)  HR: 110 (17 Oct 2021 11:49) (82 - 118)  BP: 90/55 (17 Oct 2021 11:23) (78/51 - 109/58)  BP(mean): --  RR: 19 (17 Oct 2021 11:23) (17 - 19)  SpO2: 90% (17 Oct 2021 11:49) (90% - 99%)    PHYSICAL EXAM:  GENERAL: not in acute distress at rest on venti-mask 50%  HEENT:  anicteric, moist mucous membranes  CHEST/LUNG: CTA b/l, no rales, wheezes, or rhonchi  HEART:  tachycardic, regular rhythm, S1, S2  ABDOMEN:  BS+, soft, nontender, nondistended; +RLQ hernia  EXTREMITIES: no edema, cyanosis, or calf tenderness; Right AKA; no LLE edema and TTP noted  SKIN: ashen/dusky appearance of face and skin on other parts of the body likely related to chronic minocycline use   NERVOUS SYSTEM: answers questions and follows commands appropriately    LABS:                        11.3   20.16 )-----------( 390      ( 17 Oct 2021 10:57 )             37.5     CBC Full  -  ( 17 Oct 2021 10:57 )  WBC Count : 20.16 K/uL  Hemoglobin : 11.3 g/dL  Hematocrit : 37.5 %  Platelet Count - Automated : 390 K/uL  Mean Cell Volume : 85.8 fl  Mean Cell Hemoglobin : 25.9 pg  Mean Cell Hemoglobin Concentration : 30.1 gm/dL  Auto Neutrophil # : 16.30 K/uL  Auto Lymphocyte # : 2.05 K/uL  Auto Monocyte # : 1.62 K/uL  Auto Eosinophil # : 0.01 K/uL  Auto Basophil # : 0.04 K/uL  Auto Neutrophil % : 80.9 %  Auto Lymphocyte % : 10.2 %  Auto Monocyte % : 8.0 %  Auto Eosinophil % : 0.0 %  Auto Basophil % : 0.2 %    17 Oct 2021 10:57    148    |  113    |  10     ----------------------------<  97     2.9     |  23     |  0.38     Ca    7.5        17 Oct 2021 10:57  Phos  3.2       17 Oct 2021 10:57  Mg     1.9       17 Oct 2021 10:57    TPro  4.9    /  Alb  1.7    /  TBili  0.5    /  DBili  x      /  AST  62     /  ALT  31     /  AlkPhos  197    17 Oct 2021 10:57        CAPILLARY BLOOD GLUCOSE              RADIOLOGY & ADDITIONAL TESTS:    Personally reviewed.     Consultant(s) Notes Reviewed:  [x] YES  [ ] NO

## 2021-10-17 NOTE — PROGRESS NOTE ADULT - TIME BILLING
Pt seen + examined. Case discussed with resident. Agree with assessment and plan above (edited by me in detail):  Time spent: 65min. More than 50% of the visit was spent counseling the patient on medical condition --    74yo F with PMHx of HTN, HLD, PVD s/p right AKA 2010, former smoker, depression, hypothyroidism, GERD, hx of MRSA infection, hx of TB presented to the ED complaining of weakness and SOB a/w acute hypoxic respiratory failure with left lung mass-like opacities, rule out malignancy with likely evolving sepsis due to suspected aspiration PNA with possible parapneumonic effusion.    Patient presented with worsening SOB, PRO. SpO2 88% with EMS, now worsening hypoxemia   - Concern for possible malignancy with superimposed pneumonia given CT findings.  - CXR: Small to moderate left pleural effusion with patchy opacities within both lungs compatible with airspace disease that is likely pulmonary edema. This is worse on the left. Degenerative changes are noted of the bones.  - CT Chest: Groundglass opacities in the right lung. There is left lower lobe opacity measuring 3.8 x 2.8 cm. Emphysema. There is also a 2.3 x 3.3 cm left lower lobe medial opacity abutting the pericardium. Moderate loculated left pleural effusion. Nodular left-sided pleural thickening is noted. Trace right pleural effusion.  - Pt also with history of ?TB --- discussed with pt's daughter who stated that pt had work-up for imaging finding and they were told that pt likely had small TB scar from childhood and that she had no evidence of TB infection here. Pt has been in the [place] for [place] and not traveled out.  - Afebrile, but with worsening leukocytosis with left shift, high pro-calcitonin. Lactate elevated on admission - normalized s/p IVF.   - ABG consistent with mixed respiratory and metabolic acidosis - pH 7.32, CO2 42  - CRP 29, CEA 35.7, B12 and folate wnl   - ordered US guided thoracentesis possibly for tomorrow with IR to evaluate pleural analysis of effusion -- concern pt may have parapneumonic effusion or empyema ; can also be malignant effusion (or both)  - pt admits that she coughs when drinking thin liquids -suspect pt has chronic aspiration  - c/w nebs q6h   - pt given levaquin yesterday -- broaden Abx to zosyn and azithromycin today   - check MRSA nares PCR, legionella urine Ag  - pt chronically on minocycline which is continued and should have some MRSA coverage, but if MRSA nares is positive, will broaden to vancomycin  - Continue on venti-mask, wean as tolerated  - Pulm (Dr. Porter/Blake) consulted, recs appreciated   - ID consult (Dr. Ruth), recs appreciated Pt seen + examined. Case discussed with resident. Agree with assessment and plan above (edited by me in detail):  Time spent: 65min of critical care provided during the day and during rapid response in the evening to care for patient's acute hypoxic respiratory failure, suspected severe sepsis with aspiration PNA with suspected parapneumonic effusion or empyema, possible lung malignancy with possible malignant effusion, mixed metabolic and respiratory acidosis. Counseling of the patient and pt's daughter on medical condition provided.    76yo F with PMHx of HTN, HLD, PVD s/p right AKA 2010, former smoker, depression, hypothyroidism, GERD, hx of MRSA infection, hx of TB presented to the ED complaining of weakness and SOB a/w acute hypoxic respiratory failure with left lung mass-like opacities, rule out malignancy with likely evolving sepsis due to suspected aspiration PNA with possible parapneumonic effusion, course c/b and now with acute hypoxic and hypercarbic respiratory failure requiring BiPAP and ICU admission s/p RRT this evening.    Patient presented with worsening SOB, PRO. SpO2 88% with EMS, now worsening hypoxemia   - Concern for possible malignancy with superimposed pneumonia given CT findings.  - CXR: Small to moderate left pleural effusion with patchy opacities within both lungs compatible with airspace disease that is likely pulmonary edema. This is worse on the left. Degenerative changes are noted of the bones.  - CT Chest: Groundglass opacities in the right lung. There is left lower lobe opacity measuring 3.8 x 2.8 cm. Emphysema. There is also a 2.3 x 3.3 cm left lower lobe medial opacity abutting the pericardium. Moderate loculated left pleural effusion. Nodular left-sided pleural thickening is noted. Trace right pleural effusion.  - Pt also with history of ?TB --- discussed with pt's daughter who stated that pt had work-up for imaging finding and they were told that pt likely had small TB scar from childhood and that she had no evidence of TB infection here. [place] the [place] and not traveled out.  - Afebrile, but with worsening leukocytosis with left shift, high pro-calcitonin. Lactate elevated on admission - normalized s/p IVF.   - ABG consistent with mixed respiratory and metabolic acidosis - pH 7.32, CO2 42  - CRP 29, CEA 35.7, B12 and folate wnl   - ordered US guided thoracentesis possibly for tomorrow with IR to evaluate pleural analysis of effusion -- concern pt may have parapneumonic effusion or empyema ; can also be malignant effusion (or both)  - pt admits that she coughs when drinking thin liquids -suspect pt has chronic aspiration  - c/w nebs q6h   - pt given levaquin yesterday -- broaden Abx to zosyn and azithromycin today   - check MRSA nares PCR, legionella urine Ag  - pt chronically on minocycline which is continued and should have some MRSA coverage, but if MRSA nares is positive, will broaden to vancomycin  - Continue on venti-mask, wean as tolerated  - Pulm (Dr. Porter/Blake) consulted, recs appreciated   - ID consult (Dr. Ruth), recs appreciated    Addendum: At ~7:08PM rapid response was called for worsening hypotension and hypoxemia. Pt's BP was in the 60s/30s and pt was hypoxic on 50% venti-mask. Pt was becoming more lethargic though still able to answer some questions like the name, year, location. Placed on BiPAP when ABG back as pH of 7.22 with worsening hypercarbia from the morning 42-->54. BP started to respond to first 500cc bolus during the rapid response and mariano to high 70s systolic. Pt transferred to the ICU. I spoke with pt's daughter regarding the rapid response and severity of her illness. As of now, pt is full code.

## 2021-10-17 NOTE — CONSULT NOTE ADULT - SUBJECTIVE AND OBJECTIVE BOX
75y  Female  No Known Allergies    CC; Patient is a 75y old  Female who presents with a chief complaint of weakness, hypoxia     HPI:  75 year old female with PMHx of HTN, HLD, ABDOULAYE s/p stent, PVD s/p right AKA 2010, former smoker ( at least a 30 pack year history of smoking, quit in 2010. ) depression, hypothyroidism, GERD, hx of MRSA infection, hx of TB presented to the ED complaining of weakness. Brought in by EMS and was noted to have a room air O2 sat of 88%. Patient stated that for the past 3-4 weeks, she had been feeling weaker than usual, has had less of an appetite, and  been vomiting. Patient also stated she has been having increased difficulty w/ breathing, primarily when she attempted to move around. As per patient, she came to the ED today because her symptoms were not resolving on their own.    In the ED, Labs significant for: WBC 15.66, Lactate 2.5, proBNP 613, CXR: Left sided infiltrate. Bilateral patchiness and chest CT showed Ground Glass Opacities in right lung. Left lower lobe masslike opacities suspicious for neoplasm. Nodular left-sided pleural thickening highly suspicious for neoplastic disease. Moderate loculated left pleural effusion. Prominent gastrohepatic and celiac lymph nodes. Large right lower quadrant hernia extending into the inguinal canal.      PAST MEDICAL & SURGICAL HISTORY:  Hypertension  Peripheral vascular disease  HLD (hyperlipidemia)  Hypothyroidism  Esophageal reflux  History of depression  History of methicillin resistant staphylococcus aureus (MRSA)  History of tuberculosis  Peripheral vascular disease  S/P AKA (above knee amputation), right  H/O shoulder surgery  History of total hip replacement  H/O: hysterectomy    FAMILY HISTORY:  Family history of MI (myocardial infarction) (Sibling)  In brother, brother passed away at 45 yo    Family history of valvular heart disease (Sibling)  In sister    Family history of early death (Sibling, Mother)  Brother at 43yo from MI, mother at 48 yo unknown cause    Vital Signs Last 24 Hrs  T(C): 36.5 (17 Oct 2021 20:44), Max: 37.2 (17 Oct 2021 04:48)  T(F): 97.7 (17 Oct 2021 20:44), Max: 98.9 (17 Oct 2021 04:48)  HR: 114 (17 Oct 2021 19:33) (100 - 118)  BP: 90/55 (17 Oct 2021 11:23) (78/51 - 100/51)  BP(mean): --  RR: 19 (17 Oct 2021 11:23) (17 - 19)  SpO2: 92% (17 Oct 2021 19:33) (90% - 93%)  ABG - ( 17 Oct 2021 19:27 )  pH, Arterial: 7.22  pH, Blood: x     /  pCO2: 54    /  pO2: 65    / HCO3: 22    / Base Excess: -5.6  /  SaO2: 90.9      I&O's Summary  16 Oct 2021 07:01  -  17 Oct 2021 07:00  --------------------------------------------------------  IN: 825 mL / OUT: 0 mL / NET: 825 mL    LABS  10-17  148<H>  |  113<H>  |  10  ----------------------------<  97  2.9<LL>   |  23  |  0.38<L>  Ca    7.5<L>      17 Oct 2021 10:57  Phos  3.2     10-17  Mg     1.9     10-17  TPro  4.9<L>  /  Alb  1.7<L>  /  TBili  0.5  /  DBili  x   /  AST  62<H>  /  ALT  31  /  AlkPhos  197<H>  10-17                       11.3   20.16 )-----------( 390      ( 17 Oct 2021 10:57 )             37.5     LIVER FUNCTIONS - ( 17 Oct 2021 10:57 )  Alb: 1.7 g/dL / Pro: 4.9 g/dL / ALK PHOS: 197 U/L / ALT: 31 U/L / AST: 62 U/L / GGT: x           CAPILLARY BLOOD GLUCOSE  POCT Blood Glucose.: 170 mg/dL (17 Oct 2021 19:11)    MEDICATIONS  (STANDING):  albuterol/ipratropium for Nebulization 3 milliLiter(s) Nebulizer every 6 hours  allopurinol 100 milliGRAM(s) Oral daily  aspirin enteric coated 81 milliGRAM(s) Oral daily  atorvastatin 10 milliGRAM(s) Oral at bedtime  azithromycin  IVPB 500 milliGRAM(s) IV Intermittent every 24 hours  cholecalciferol 1000 Unit(s) Oral daily  dextrose 5% + sodium chloride 0.45% with potassium chloride 20 mEq/L 1000 milliLiter(s) (70 mL/Hr) IV Continuous <Continuous>  influenza   Vaccine 0.5 milliLiter(s) IntraMuscular once  lactobacillus acidophilus 1 Tablet(s) Oral daily  levothyroxine 100 MICROGram(s) Oral daily  minocycline 100 milliGRAM(s) Oral two times a day  pantoprazole    Tablet 40 milliGRAM(s) Oral before breakfast  piperacillin/tazobactam IVPB.. 3.375 Gram(s) IV Intermittent every 8 hours  sertraline 100 milliGRAM(s) Oral daily      REVIEW OF SYSTEMS:    CONSTITUTIONAL: No fever, weight loss, or fatigue  EYES: No eye pain, visual disturbances, or discharge  ENMT:  No difficulty hearing, tinnitus, vertigo; No sinus or throat pain  NECK: No pain or stiffness  BREASTS: No pain, masses, or nipple discharge  RESPIRATORY: No cough, wheezing, chills or hemoptysis; No shortness of breath  CARDIOVASCULAR: No chest pain, palpitations, dizziness, or leg swelling  GASTROINTESTINAL: No abdominal or epigastric pain. No nausea, vomiting, or hematemesis; No diarrhea or constipation. No melena or hematochezia.  GENITOURINARY: No dysuria, frequency, hematuria, or incontinence  NEUROLOGICAL: No headaches, memory loss, loss of strength, numbness, or tremors  SKIN: No itching, burning, rashes, or lesions   LYMPH NODES: No enlarged glands  ENDOCRINE: No heat or cold intolerance; No hair loss  MUSCULOSKELETAL: No joint pain or swelling; No muscle, back, or extremity pain  PSYCHIATRIC: No depression, anxiety, mood swings, or difficulty sleeping  HEME/LYMPH: No easy bruising, or bleeding gums  ALLERY AND IMMUNOLOGIC: No hives or eczema      Physicial Exam:     Constitutional: NAD, well-groomed, well-developed  HEENT: PERRLA, EOMI, no drainage or redness  Neck: No bruits; no thyromegaly or nodules,  No JVD  Back: Normal spine flexure, No CVA tenderness, No deformity or limitation of movement  Respiratory: Breath Sounds equal & clear to percussion & auscultation, no accessory muscle use  Cardiovascular: Regular rate & rhythm, normal S1, S2; no murmurs, gallops or rubs; no S3, S4  Gastrointestinal: Soft, non-tender, non distended no hepatosplenomegaly, normal bowel sounds  Extremities: No peripheral edema, No cyanosis, clubbing   Vascular: Equal and normal pulses: 2+ peripheral pulses throughout  Neurological: GCS:    A&O x 3; no sensory, motor  deficits, normal reflexes  Psychiatric: Normal mood, normal affect  Musculoskeletal: No joint pain, swelling or deformity; no limitation of movement  Skin: No rashes             75y  Female  No Known Allergies    CC; Patient is a 75y old  Female who presents with a chief complaint of weakness, hypoxia     HPI:  75 year old female with PMHx of HTN, HLD, ABDOULAYE s/p stent, PVD s/p right AKA 2010, former smoker ( at least a 30 pack year history of smoking, quit in 2010. ) depression, hypothyroidism, GERD, hx of MRSA infection, hx of TB presented to the ED complaining of weakness. Brought in by EMS and was noted to have a room air O2 sat of 88%. Patient stated that for the past 3-4 weeks, she had been feeling weaker than usual, has had less of an appetite, and  been vomiting. Patient also stated she has been having increased difficulty w/ breathing, primarily when she attempted to move around. As per patient, she came to the ED today because her symptoms were not resolving on their own.    In the ED, Labs significant for: WBC 15.66, Lactate 2.5, proBNP 613, CXR: Left sided infiltrate. Bilateral patchiness and chest CT showed Ground Glass Opacities in right lung. Left lower lobe masslike opacities suspicious for neoplasm. Nodular left-sided pleural thickening highly suspicious for neoplastic disease. Moderate loculated left pleural effusion. Prominent gastrohepatic and celiac lymph nodes. Large right lower quadrant hernia extending into the inguinal canal.    Tonight a rapid response was called  hypoxia and hypotension. Apparently nursing found the patient to be hypoxic in low 80's on 50% venturi mask with low systolic blood pressure. She was placed on BIPAP for increased work of breathing with accessory muscle use and she was upgraded. On arrival in ICU her systolic blood pressure was 111 but fluid bolus was continued.        PAST MEDICAL & SURGICAL HISTORY:  Hypertension  Peripheral vascular disease  HLD (hyperlipidemia)  Hypothyroidism  Esophageal reflux  History of depression  History of methicillin resistant staphylococcus aureus (MRSA)  History of tuberculosis  Peripheral vascular disease  S/P AKA (above knee amputation), right  H/O shoulder surgery  History of total hip replacement  H/O: hysterectomy    FAMILY HISTORY:  Family history of MI (myocardial infarction) (Sibling)  In brother, brother passed away at 45 yo    Family history of valvular heart disease (Sibling)  In sister    Family history of early death (Sibling, Mother)  Brother at 45yo from MI, mother at 46 yo unknown cause    Vital Signs Last 24 Hrs  T(C): 36.5 (17 Oct 2021 20:44), Max: 37.2 (17 Oct 2021 04:48)  T(F): 97.7 (17 Oct 2021 20:44), Max: 98.9 (17 Oct 2021 04:48)  HR: 114 (17 Oct 2021 19:33) (100 - 118)  BP: 90/55 (17 Oct 2021 11:23) (78/51 - 100/51)  BP(mean): --  RR: 19 (17 Oct 2021 11:23) (17 - 19)  SpO2: 92% (17 Oct 2021 19:33) (90% - 93%)  ABG - ( 17 Oct 2021 19:27 )  pH, Arterial: 7.22  pH, Blood: x     /  pCO2: 54    /  pO2: 65    / HCO3: 22    / Base Excess: -5.6  /  SaO2: 90.9      I&O's Summary  16 Oct 2021 07:01  -  17 Oct 2021 07:00  --------------------------------------------------------  IN: 825 mL / OUT: 0 mL / NET: 825 mL    LABS  10-17  148<H>  |  113<H>  |  10  ----------------------------<  97  2.9<LL>   |  23  |  0.38<L>  Ca    7.5<L>      17 Oct 2021 10:57  Phos  3.2     10-17  Mg     1.9     10-17  TPro  4.9<L>  /  Alb  1.7<L>  /  TBili  0.5  /  DBili  x   /  AST  62<H>  /  ALT  31  /  AlkPhos  197<H>  10-17                       11.3   20.16 )-----------( 390      ( 17 Oct 2021 10:57 )             37.5     LIVER FUNCTIONS - ( 17 Oct 2021 10:57 )  Alb: 1.7 g/dL / Pro: 4.9 g/dL / ALK PHOS: 197 U/L / ALT: 31 U/L / AST: 62 U/L / GGT: x           CAPILLARY BLOOD GLUCOSE  POCT Blood Glucose.: 170 mg/dL (17 Oct 2021 19:11)    MEDICATIONS  (STANDING):  albuterol/ipratropium for Nebulization 3 milliLiter(s) Nebulizer every 6 hours  allopurinol 100 milliGRAM(s) Oral daily  aspirin enteric coated 81 milliGRAM(s) Oral daily  atorvastatin 10 milliGRAM(s) Oral at bedtime  azithromycin  IVPB 500 milliGRAM(s) IV Intermittent every 24 hours  cholecalciferol 1000 Unit(s) Oral daily  dextrose 5% + sodium chloride 0.45% with potassium chloride 20 mEq/L 1000 milliLiter(s) (70 mL/Hr) IV Continuous <Continuous>  influenza   Vaccine 0.5 milliLiter(s) IntraMuscular once  lactobacillus acidophilus 1 Tablet(s) Oral daily  levothyroxine 100 MICROGram(s) Oral daily  minocycline 100 milliGRAM(s) Oral two times a day  pantoprazole    Tablet 40 milliGRAM(s) Oral before breakfast  piperacillin/tazobactam IVPB.. 3.375 Gram(s) IV Intermittent every 8 hours  sertraline 100 milliGRAM(s) Oral daily      REVIEW OF SYSTEMS:    CONSTITUTIONAL: No fever, weight loss, or fatigue  EYES: No eye pain, visual disturbances, or discharge  ENMT:  No difficulty hearing, tinnitus, vertigo; No sinus or throat pain  NECK: No pain or stiffness  BREASTS: No pain, masses, or nipple discharge  RESPIRATORY: No cough, wheezing, chills or hemoptysis; No shortness of breath  CARDIOVASCULAR: No chest pain, palpitations, dizziness, or leg swelling  GASTROINTESTINAL: No abdominal or epigastric pain. No nausea, vomiting, or hematemesis; No diarrhea or constipation. No melena or hematochezia.  GENITOURINARY: No dysuria, frequency, hematuria, or incontinence  NEUROLOGICAL: No headaches, memory loss, loss of strength, numbness, or tremors  SKIN: No itching, burning, rashes, or lesions   LYMPH NODES: No enlarged glands  ENDOCRINE: No heat or cold intolerance; No hair loss  MUSCULOSKELETAL: No joint pain or swelling; No muscle, back, or extremity pain  PSYCHIATRIC: No depression, anxiety, mood swings, or difficulty sleeping  HEME/LYMPH: No easy bruising, or bleeding gums  ALLERY AND IMMUNOLOGIC: No hives or eczema      Physicial Exam:     Constitutional: NAD, well-groomed, well-developed  HEENT: PERRLA, EOMI, no drainage or redness  Neck: No bruits; no thyromegaly or nodules,  No JVD  Back: Normal spine flexure, No CVA tenderness, No deformity or limitation of movement  Respiratory: Breath Sounds equal & clear to percussion & auscultation, no accessory muscle use  Cardiovascular: Regular rate & rhythm, normal S1, S2; no murmurs, gallops or rubs; no S3, S4  Gastrointestinal: Soft, non-tender, non distended no hepatosplenomegaly, normal bowel sounds  Extremities: No peripheral edema, No cyanosis, clubbing   Vascular: Equal and normal pulses: 2+ peripheral pulses throughout  Neurological: GCS:    A&O x 3; no sensory, motor  deficits, normal reflexes  Psychiatric: Normal mood, normal affect  Musculoskeletal: No joint pain, swelling or deformity; no limitation of movement  Skin: No rashes             75y  Female  No Known Allergies    CC; Patient is a 75y old  Female who presents with a chief complaint of weakness, hypoxia     HPI:  75 year old female with PMHx of HTN, HLD, ABDOULAYE s/p stent, PVD s/p right AKA 2010, former smoker ( at least a 30 pack year history of smoking, quit in 2010. ) depression, hypothyroidism, GERD, hx of MRSA infection, hx of TB presented to the ED complaining of weakness. Brought in by EMS and was noted to have a room air O2 sat of 88%. Patient stated that for the past 3-4 weeks, she had been feeling weaker than usual, has had less of an appetite, and  been vomiting. Patient also stated she has been having increased difficulty w/ breathing, primarily when she attempted to move around. As per patient, she came to the ED today because her symptoms were not resolving on their own.    In the ED, Labs significant for: WBC 15.66, Lactate 2.5, proBNP 613, CXR: Left sided infiltrate. Bilateral patchiness and chest CT showed Ground Glass Opacities in right lung. Left lower lobe masslike opacities suspicious for neoplasm. Nodular left-sided pleural thickening highly suspicious for neoplastic disease with moderate loculated left pleural effusion.  She was admitted with Concern for possible malignancy with superimposed pneumonia with the plan for ultrasound guided thoracentesis scheduled for tomorrow to evaluate for malignant effusion ( Lovenox on hold), continue Zosyn and Azithromycin with ID aware and following up cultures     Tonight a rapid response was called  hypoxia and hypotension. Apparently nursing found the patient to be hypoxic in low 80's on 50% venturi mask with low systolic blood pressure. She was placed on BIPAP for increased work of breathing with accessory muscle use and upgraded. On arrival in ICU her systolic blood pressure was 111 but fluid bolus was continued.        PAST MEDICAL & SURGICAL HISTORY:  Hypertension  Peripheral vascular disease  HLD (hyperlipidemia)  Hypothyroidism  Esophageal reflux  History of depression  History of methicillin resistant staphylococcus aureus (MRSA)  History of tuberculosis  Peripheral vascular disease  S/P AKA (above knee amputation), right  H/O shoulder surgery  History of total hip replacement  H/O: hysterectomy    FAMILY HISTORY:  Family history of MI (myocardial infarction) (Sibling)  In brother, brother passed away at 43 yo    Family history of valvular heart disease (Sibling)  In sister    Family history of early death (Sibling, Mother)  Brother at 43yo from MI, mother at 48 yo unknown cause    Vital Signs Last 24 Hrs  T(C): 36.5 (17 Oct 2021 20:44), Max: 37.2 (17 Oct 2021 04:48)  T(F): 97.7 (17 Oct 2021 20:44), Max: 98.9 (17 Oct 2021 04:48)  HR: 114 (17 Oct 2021 19:33) (100 - 118)  BP: 90/55 (17 Oct 2021 11:23) (78/51 - 100/51)  BP(mean): --  RR: 19 (17 Oct 2021 11:23) (17 - 19)  SpO2: 92% (17 Oct 2021 19:33) (90% - 93%)  ABG - ( 17 Oct 2021 19:27 )  pH, Arterial: 7.22  pH, Blood: x     /  pCO2: 54    /  pO2: 65    / HCO3: 22    / Base Excess: -5.6  /  SaO2: 90.9      I&O's Summary  16 Oct 2021 07:01  -  17 Oct 2021 07:00  --------------------------------------------------------  IN: 825 mL / OUT: 0 mL / NET: 825 mL    LABS  10-17  148<H>  |  113<H>  |  10  ----------------------------<  97  2.9<LL>   |  23  |  0.38<L>  Ca    7.5<L>      17 Oct 2021 10:57  Phos  3.2     10-17  Mg     1.9     10-17  TPro  4.9<L>  /  Alb  1.7<L>  /  TBili  0.5  /  DBili  x   /  AST  62<H>  /  ALT  31  /  AlkPhos  197<H>  10-17                       11.3   20.16 )-----------( 390      ( 17 Oct 2021 10:57 )             37.5     LIVER FUNCTIONS - ( 17 Oct 2021 10:57 )  Alb: 1.7 g/dL / Pro: 4.9 g/dL / ALK PHOS: 197 U/L / ALT: 31 U/L / AST: 62 U/L / GGT: x           CAPILLARY BLOOD GLUCOSE  POCT Blood Glucose.: 170 mg/dL (17 Oct 2021 19:11)    MEDICATIONS  (STANDING):  albuterol/ipratropium for Nebulization 3 milliLiter(s) Nebulizer every 6 hours  allopurinol 100 milliGRAM(s) Oral daily  aspirin enteric coated 81 milliGRAM(s) Oral daily  atorvastatin 10 milliGRAM(s) Oral at bedtime  azithromycin  IVPB 500 milliGRAM(s) IV Intermittent every 24 hours  cholecalciferol 1000 Unit(s) Oral daily  dextrose 5% + sodium chloride 0.45% with potassium chloride 20 mEq/L 1000 milliLiter(s) (70 mL/Hr) IV Continuous <Continuous>  influenza   Vaccine 0.5 milliLiter(s) IntraMuscular once  lactobacillus acidophilus 1 Tablet(s) Oral daily  levothyroxine 100 MICROGram(s) Oral daily  minocycline 100 milliGRAM(s) Oral two times a day  pantoprazole    Tablet 40 milliGRAM(s) Oral before breakfast  piperacillin/tazobactam IVPB.. 3.375 Gram(s) IV Intermittent every 8 hours  sertraline 100 milliGRAM(s) Oral daily      REVIEW OF SYSTEMS:    CONSTITUTIONAL: No fever, weight loss, or fatigue  EYES: No eye pain, visual disturbances, or discharge  ENMT:  No difficulty hearing, tinnitus, vertigo; No sinus or throat pain  NECK: No pain or stiffness  BREASTS: No pain, masses, or nipple discharge  RESPIRATORY: No cough, wheezing, chills or hemoptysis; No shortness of breath  CARDIOVASCULAR: No chest pain, palpitations, dizziness, or leg swelling  GASTROINTESTINAL: No abdominal or epigastric pain. No nausea, vomiting, or hematemesis; No diarrhea or constipation. No melena or hematochezia.  GENITOURINARY: No dysuria, frequency, hematuria, or incontinence  NEUROLOGICAL: No headaches, memory loss, loss of strength, numbness, or tremors  SKIN: No itching, burning, rashes, or lesions   LYMPH NODES: No enlarged glands  ENDOCRINE: No heat or cold intolerance; No hair loss  MUSCULOSKELETAL: No joint pain or swelling; No muscle, back, or extremity pain  PSYCHIATRIC: No depression, anxiety, mood swings, or difficulty sleeping  HEME/LYMPH: No easy bruising, or bleeding gums  ALLERY AND IMMUNOLOGIC: No hives or eczema      Physicial Exam:     Constitutional: NAD, well-groomed, well-developed  HEENT: PERRLA, EOMI, no drainage or redness  Neck: No bruits; no thyromegaly or nodules,  No JVD  Back: Normal spine flexure, No CVA tenderness, No deformity or limitation of movement  Respiratory: Breath Sounds equal & clear to percussion & auscultation, no accessory muscle use  Cardiovascular: Regular rate & rhythm, normal S1, S2; no murmurs, gallops or rubs; no S3, S4  Gastrointestinal: Soft, non-tender, non distended no hepatosplenomegaly, normal bowel sounds  Extremities: No peripheral edema, No cyanosis, clubbing   Vascular: Equal and normal pulses: 2+ peripheral pulses throughout  Neurological: GCS:    A&O x 3; no sensory, motor  deficits, normal reflexes  Psychiatric: Normal mood, normal affect  Musculoskeletal: No joint pain, swelling or deformity; no limitation of movement  Skin: No rashes             75y  Female  No Known Allergies    CC; Patient is a 75y old  Female who presents with a chief complaint of weakness, hypoxia     HPI:  75 year old female with PMHx of HTN, HLD, ABDOULAYE s/p stent, PVD s/p right AKA 2010, former smoker ( at least a 30 pack year history of smoking, quit in 2010. ) depression, hypothyroidism, GERD, hx of MRSA infection, hx of TB presented to the ED complaining of weakness. Brought in by EMS and was noted to have a room air O2 sat of 88%. Patient stated that for the past 3-4 weeks, she had been feeling weaker than usual, has had less of an appetite, and  been vomiting. Patient also stated she has been having increased difficulty breathing with attemptto move around.  She came to the ED today because her symptoms were not resolving on their own.    In the ED, Labs significant for: WBC 15.66, Lactate 2.5, proBNP 613, CXR: Left sided infiltrate. Bilateral patchiness and chest CT showed Ground Glass Opacities in right lung. Left lower lobe masslike opacities suspicious for neoplasm. Nodular left-sided pleural thickening highly suspicious for neoplastic disease with moderate loculated left pleural effusion.  She was admitted with Concern for possible malignancy with superimposed pneumonia with the plan for ultrasound guided thoracentesis scheduled for tomorrow to evaluate for malignant effusion ( Lovenox on hold), continue Zosyn and Azithromycin with ID aware and following up cultures     Tonight a rapid response was called for hypoxia and hypotension. Apparently nursing found the patient to be hypoxic in low 80's on 50% venturi mask with low systolic blood pressure. She was placed on BIPAP for increased work of breathing with accessory muscle use and upgraded. On arrival in ICU her systolic blood pressure was 111 but fluid bolus was continued.        PAST MEDICAL & SURGICAL HISTORY:  Hypertension  Peripheral vascular disease  HLD (hyperlipidemia)  Hypothyroidism  Esophageal reflux  History of depression  History of methicillin resistant staphylococcus aureus (MRSA)  History of tuberculosis  Peripheral vascular disease  S/P AKA (above knee amputation), right  H/O shoulder surgery  History of total hip replacement  H/O: hysterectomy    FAMILY HISTORY:  Family history of MI (myocardial infarction) (Sibling)  In brother, brother passed away at 45 yo    Family history of valvular heart disease (Sibling)  In sister    Family history of early death (Sibling, Mother)  Brother at 45yo from MI, mother at 46 yo unknown cause    Vital Signs Last 24 Hrs  T(C): 36.5 (17 Oct 2021 20:44), Max: 37.2 (17 Oct 2021 04:48)  T(F): 97.7 (17 Oct 2021 20:44), Max: 98.9 (17 Oct 2021 04:48)  HR: 114 (17 Oct 2021 19:33) (100 - 118)  BP: 90/55 (17 Oct 2021 11:23) (78/51 - 100/51)  BP(mean): --  RR: 19 (17 Oct 2021 11:23) (17 - 19)  SpO2: 92% (17 Oct 2021 19:33) (90% - 93%)  ABG - ( 17 Oct 2021 19:27 )  pH, Arterial: 7.22  pH, Blood: x     /  pCO2: 54    /  pO2: 65    / HCO3: 22    / Base Excess: -5.6  /  SaO2: 90.9      I&O's Summary  16 Oct 2021 07:01  -  17 Oct 2021 07:00  --------------------------------------------------------  IN: 825 mL / OUT: 0 mL / NET: 825 mL    LABS  10-17  148<H>  |  113<H>  |  10  ----------------------------<  97  2.9<LL>   |  23  |  0.38<L>  Ca    7.5<L>      17 Oct 2021 10:57  Phos  3.2     10-17  Mg     1.9     10-17  TPro  4.9<L>  /  Alb  1.7<L>  /  TBili  0.5  /  DBili  x   /  AST  62<H>  /  ALT  31  /  AlkPhos  197<H>  10-17                       11.3   20.16 )-----------( 390      ( 17 Oct 2021 10:57 )             37.5     LIVER FUNCTIONS - ( 17 Oct 2021 10:57 )  Alb: 1.7 g/dL / Pro: 4.9 g/dL / ALK PHOS: 197 U/L / ALT: 31 U/L / AST: 62 U/L / GGT: x           CAPILLARY BLOOD GLUCOSE  POCT Blood Glucose.: 170 mg/dL (17 Oct 2021 19:11)    MEDICATIONS  (STANDING):  albuterol/ipratropium for Nebulization 3 milliLiter(s) Nebulizer every 6 hours  allopurinol 100 milliGRAM(s) Oral daily  aspirin enteric coated 81 milliGRAM(s) Oral daily  atorvastatin 10 milliGRAM(s) Oral at bedtime  azithromycin  IVPB 500 milliGRAM(s) IV Intermittent every 24 hours  cholecalciferol 1000 Unit(s) Oral daily  dextrose 5% + sodium chloride 0.45% with potassium chloride 20 mEq/L 1000 milliLiter(s) (70 mL/Hr) IV Continuous <Continuous>  influenza   Vaccine 0.5 milliLiter(s) IntraMuscular once  lactobacillus acidophilus 1 Tablet(s) Oral daily  levothyroxine 100 MICROGram(s) Oral daily  minocycline 100 milliGRAM(s) Oral two times a day  pantoprazole    Tablet 40 milliGRAM(s) Oral before breakfast  piperacillin/tazobactam IVPB.. 3.375 Gram(s) IV Intermittent every 8 hours  sertraline 100 milliGRAM(s) Oral daily      REVIEW OF SYSTEMS:    CONSTITUTIONAL: No fever, weight loss, or fatigue  EYES: No eye pain, visual disturbances, or discharge  ENMT:  No difficulty hearing, tinnitus, vertigo; No sinus or throat pain  NECK: No pain or stiffness  BREASTS: No pain, masses, or nipple discharge  RESPIRATORY: No cough, wheezing, chills or hemoptysis; No shortness of breath  CARDIOVASCULAR: No chest pain, palpitations, dizziness, or leg swelling  GASTROINTESTINAL: No abdominal or epigastric pain. No nausea, vomiting, or hematemesis; No diarrhea or constipation. No melena or hematochezia.  GENITOURINARY: No dysuria, frequency, hematuria, or incontinence  NEUROLOGICAL: No headaches, memory loss, loss of strength, numbness, or tremors  SKIN: No itching, burning, rashes, or lesions   LYMPH NODES: No enlarged glands  ENDOCRINE: No heat or cold intolerance; No hair loss  MUSCULOSKELETAL: No joint pain or swelling; No muscle, back, or extremity pain  PSYCHIATRIC: No depression, anxiety, mood swings, or difficulty sleeping  HEME/LYMPH: No easy bruising, or bleeding gums  ALLERY AND IMMUNOLOGIC: No hives or eczema      Physicial Exam:     Constitutional: NAD, well-groomed, well-developed  HEENT: PERRLA, EOMI, no drainage or redness  Neck: No bruits; no thyromegaly or nodules,  No JVD  Back: Normal spine flexure, No CVA tenderness, No deformity or limitation of movement  Respiratory: Breath Sounds equal & clear to percussion & auscultation, no accessory muscle use  Cardiovascular: Regular rate & rhythm, normal S1, S2; no murmurs, gallops or rubs; no S3, S4  Gastrointestinal: Soft, non-tender, non distended no hepatosplenomegaly, normal bowel sounds  Extremities: No peripheral edema, No cyanosis, clubbing   Vascular: Equal and normal pulses: 2+ peripheral pulses throughout  Neurological: GCS:    A&O x 3; no sensory, motor  deficits, normal reflexes  Psychiatric: Normal mood, normal affect  Musculoskeletal: No joint pain, swelling or deformity; no limitation of movement  Skin: No rashes

## 2021-10-17 NOTE — CONSULT NOTE ADULT - ASSESSMENT
75 year old female with acute hypoxic respiratory failure secondary to PNA with high suspicion for possible malignancy given findings on cat scan.    Acute respiratory failure with hypoxia    Duonebs q6h    Pneumonia    Mass of Left lower lobe     Hypothyroidism    Critical Care time: 35 mins assessing presenting problems of acute illness that poses high probability of life threatening deterioration or end organ damage/dysfunction.  Medical decision making inculding Initiating plan of care, reviewing data, reviewing radiology,direct patient bedside evaluation and interpretation of vital signs, any necessary ventilator management , discusion with multidisciplinary team, discussing goals of care with patient/family, all non inclusive of procedures, COVID 19 specific considerations and therapeutic  options based on the available and rapidly changing literature  75 year old female with acute hypoxic respiratory failure secondary to PNA with high suspicion for possible malignancy given findings on cat scan.    Acute respiratory failure with hypoxia  -Patient currently on non invasive vent support in form of BIPAP  -titrate settings to maintain SaO2 >90%, or pH >7.25  - VAP prophylaxis with HOB 30 degrees   -aggressive chest PT and suctioning   -Duonebs q6h    Pneumonia  Continue with Zosyn and Azithromycin     follow up blood cultures, MRSA PCR. urinary ag   Monitor WBC and trend fever with Tylenol PRN      Mass of Left lower lobe   Suspicion of malignancy, scheduled for thoracentesis tomorrow to evaluate pleural fluid of suspected malignant effusion    History of hypertension  Pt was slightly hypotensive so home meds held ( Toprol 25mg qd and  nifedipine 30mg qd )   That said at this time systolic pressures are in the low 120's and will continue to hold antihypertensive overnight given PNA and concern for sepsis tonight then reassess in morning     Hypothyroidism  synthroid 100mcg  monitor TSH    Critical Care time: 35 mins assessing presenting problems of acute illness that poses high probability of life threatening deterioration or end organ damage/dysfunction.  Medical decision making inculding Initiating plan of care, reviewing data, reviewing radiology,direct patient bedside evaluation and interpretation of vital signs, any necessary ventilator management , discusion with multidisciplinary team, discussing goals of care with patient/family, all non inclusive of procedures, COVID 19 specific considerations and therapeutic  options based on the available and rapidly changing literature  75 year old female with acute hypoxic respiratory failure secondary to PNA with high suspicion for possible malignancy given findings on cat scan.    Acute respiratory failure with hypoxia  -Patient currently on non invasive vent support in form of BIPAP  -titrate settings to maintain SaO2 >90%, or pH >7.25  - VAP prophylaxis with HOB 30 degrees   -aggressive chest PT and suctioning   -Duonebs q6h  -morning chest xrary    Pneumonia  Continue with Zosyn and Azithromycin     follow up blood cultures, MRSA PCR. urinary ag   Monitor WBC and trend fever with Tylenol PRN      Mass of Left lower lobe   Suspicion of malignancy, scheduled for thoracentesis tomorrow to evaluate pleural fluid of suspected malignant effusion    History of hypertension  Pt was slightly hypotensive so home meds held ( Toprol 25mg qd and  nifedipine 30mg qd )   That said at this time systolic pressures are in the low 120's and will continue to hold antihypertensive overnight given PNA and concern for sepsis tonight then reassess in morning     Hypothyroidism  synthroid 100mcg  monitor TSH    Critical Care time: 35 mins assessing presenting problems of acute illness that poses high probability of life threatening deterioration or end organ damage/dysfunction.  Medical decision making inculding Initiating plan of care, reviewing data, reviewing radiology,direct patient bedside evaluation and interpretation of vital signs, any necessary ventilator management , discusion with multidisciplinary team, discussing goals of care with patient/family, all non inclusive of procedures, COVID 19 specific considerations and therapeutic  options based on the available and rapidly changing literature

## 2021-10-17 NOTE — PROGRESS NOTE ADULT - PROBLEM SELECTOR PLAN 1
Patient presented with worsening SOB, PRO. SpO2 88% with EMS, 91% on admission.   - Concern for possible malignancy with superimposed pneumonia given CT findings.  - CXR: Small to moderate left pleural effusion with patchy opacities within both lungs compatible with airspace disease that is likely pulmonary edema. This is worse on the left. Degenerative changes are noted of the bones.  - CT Chest: Groundglass opacities in the right lung. There is left lower lobe opacity measuring 3.8 x 2.8 cm. Emphysema. There is also a 2.3 x 3.3 cm left lower lobe medial opacity abutting the pericardium. Moderate loculated left pleural effusion. Nodular left-sided pleural thickening is noted. Trace right pleural effusion.  - Pt also with history of TB noted on outpatient records, unclear timing of diagnosis and if treated.   - Afebrile, but with worsening leukocytosis with left shift, pro-calcitonin. Lactate elevated but normalized s/p IVF.   - ABG consistent with respiratory acidosis - pH 7.32, CO2 42, bicarb 22  - CRP 29, CEA 35.7, B12 and folate wnl   - US guided thoracentesis scheduled for tomorrow to evaluate pleural analysis of suspected malignant effusion.  - Duonebs q6h   - Continue on BiPAP, wean as tolerated.   - Pulm (Dr. Porter) consulted, recs appreciated   - ID consult (Dr. Ruth), recs appreciated Patient presented with worsening SOB, PRO. SpO2 88% with EMS, now worsening hypoxemia   - Concern for possible malignancy with superimposed pneumonia given CT findings.  - CXR: Small to moderate left pleural effusion with patchy opacities within both lungs compatible with airspace disease that is likely pulmonary edema. This is worse on the left. Degenerative changes are noted of the bones.  - CT Chest: Groundglass opacities in the right lung. There is left lower lobe opacity measuring 3.8 x 2.8 cm. Emphysema. There is also a 2.3 x 3.3 cm left lower lobe medial opacity abutting the pericardium. Moderate loculated left pleural effusion. Nodular left-sided pleural thickening is noted. Trace right pleural effusion.  - Pt also with history of ?TB --- discussed with pt's daughter who stated that pt had work-up for imaging finding and they were told that pt likely had small TB scar from childhood and that she had no evidence of TB infection here. Pt has been in the [place] for decades and not traveled out.  - Afebrile, but with worsening leukocytosis with left shift, high pro-calcitonin. Lactate elevated on admission - normalized s/p IVF.   - ABG consistent with mixed respiratory and metabolic acidosis - pH 7.32, CO2 42  - CRP 29, CEA 35.7, B12 and folate wnl   - ordered US guided thoracentesis possibly for tomorrow with IR to evaluate pleural analysis of effusion -- concern pt may have parapneumonic effusion or empyema ; can also be malignant effusion (or both)  - pt admits that she coughs when drinking thin liquids -suspect pt has chronic aspiration  - c/w nebs q6h   - pt given levaquin yesterday -- broaden Abx to zosyn and azithromycin today   - check MRSA nares PCR, legionella urine Ag  - pt chronically on minocycline which is continued and should have some MRSA coverage, but if MRSA nares is positive, will broaden to vancomycin  - Continue on venti-mask, wean as tolerated  - Pulm (Dr. Porter/Blake) consulted, recs appreciated   - ID consult (Dr. Ruth), recs appreciated

## 2021-10-17 NOTE — DISCHARGE NOTE PROVIDER - HOSPITAL COURSE
FROM ADMISSION H+P:   HPI:  76 y/o F with PMHx of HTN, HLD, ABDOULAYE s/p stent, PVD s/p right AKA , former smoker, depression, hypothyroidism, GERD, hx of MRSA infection, hx of TB presented to the ED complaining of weakness. Brought in by EMS and was noted to have a room air O2 sat of 88%. Patient states that for the past 3-4 weeks, she has been feeling weaker than usual, has had less of an appetite, and has been vomiting. Patient also states she has been having increased difficulty w/ breathing, primarily when she attempts to move around. As per patient, she came to the ED today because her symptoms were not resolving on their own. Patient lives at home with her  and ambulates with a walker. She has at least a 30 pack year history of smoking, quit in . She stopped her aspirin 1 week ago on her own due to GI symptoms. Her mother  at 47 years old in [place] from an unknown cause. Her brother  at 44 years old from a heart attack. As per patient's daughter, patient has no known family history of cancer. Does note that her leg has become more swollen. Her ashen skin complexion has been the same for the past 11 years.    In the ED,  Vital Signs Last 24 Hrs T(F)Max: 99.1 HR: 82-94 BP: 79/45-88/57 RR: 18 SpO2: 91%  Labs significant for: WBC 15.66, Alk Phos 211, AST 67, PCT 3.85, Lactate 2.5, proBNP 613  CXR: Left sided infiltrate. Bilateral patchiness.  CT CAP: Ground Glass Opacities in right lung. Left lower lobe masslike opacities suspicious for neoplasm. Nodular left-sided pleural thickening highly suspicious for neoplastic disease. Moderate loculated left pleural effusion. Prominent gastrohepatic and celiac lymph nodes. Large right lower quadrant hernia extending into the inguinal canal.  EKG: NSR at 90bpm (16 Oct 2021 19:02)      ---  HOSPITAL COURSE: Patient admitted for acute hypoxic respiratory failure with left lung mass-like opacities, rule out malignancy +/- superimposed pneumonia. Patient with worsening leukocytosis, started on Zosyn and Azithromycin. Blood cultures showed ______. MRSA PCR showed _____. Urine legionella showed ________. Thoracentesis done to evaluate for malignant effusion. Pleural fluid analysis showed _______. Patient with mild hyponatremia and mild hypernatremia likely reated to decreased PO intake which improved with IVF.   Patient was medically optimized and improved clinically throughout hospital course. Patient seen and examined on day of discharge. Patient is medically stable for discharge to ______ with outpatient follow up.       updated     ---  CONSULTANTS:     ---  TIME SPENT:  I, the attending physician, was physically present for the key portions of the evaluation and management (E/M) service provided. The total amount of time spent reviewing the hospital notes, laboratory values, imaging findings, assessing/counseling the patient, discussing with consultant physicians, social work, nursing staff was -- minutes    ---  Primary care provider was made aware of plan for discharge:      [  ] NO     [ x ] YES

## 2021-10-17 NOTE — PROGRESS NOTE ADULT - PROBLEM SELECTOR PLAN 6
Chronic, stable   - C/w asa + lipitor   - C/w gabapentin PRN for phantom limb pain  - Holding home nifedipine for hypotension Chronic, Stable   -C/w zoloft 100mg

## 2021-10-17 NOTE — PROGRESS NOTE ADULT - PROBLEM SELECTOR PLAN 10
Chronic stable   - C/w Lipitor 10mg # DVT ppx: Lovenox 40 subq daily, can hold for procedure       #Dysphagis   - S/S consulted, will appreciate recs   - Diet: mechanical soft

## 2021-10-17 NOTE — CONSULT NOTE ADULT - SUBJECTIVE AND OBJECTIVE BOX
EGIDIA TELESCA    [facility] TELE 303 W1    Allergies    No Known Allergies    Intolerances        PAST MEDICAL & SURGICAL HISTORY:  Hypertension    Peripheral vascular disease    HLD (hyperlipidemia)    Hypothyroidism    Esophageal reflux    History of depression    History of methicillin resistant staphylococcus aureus (MRSA)    History of tuberculosis    Peripheral vascular disease    S/P AKA (above knee amputation), right    H/O shoulder surgery    History of total hip replacement    H/O: hysterectomy        FAMILY HISTORY:  Family history of MI (myocardial infarction) (Sibling)  In brother, brother passed away at 45 yo    Family history of valvular heart disease (Sibling)  In sister    Family history of early death (Sibling, Mother)  Brother at 43yo from MI, mother at 48 yo unknown cause        Home Medications:  Acidophilus oral tablet: 1 tab(s) orally once a day (16 Oct 2021 20:57)  allopurinol 100 mg oral tablet: 1 tab(s) orally once a day (16 Oct 2021 20:57)  Aspirin Enteric Coated 81 mg oral delayed release tablet: 1 tab(s) orally once a day (16 Oct 2021 20:57)  gabapentin 300 mg oral tablet: 1 tab(s) orally once a day, As Needed for pain (16 Oct 2021 20:57)  levothyroxine 100 mcg (0.1 mg) oral tablet: 1 tab(s) orally once a day (16 Oct 2021 20:57)  Lipitor 10 mg oral tablet: 1 tab(s) orally once a day (16 Oct 2021 20:57)  minocycline 100 mg oral tablet: 1 tab(s) orally every 12 hours (16 Oct 2021 20:57)  pantoprazole 40 mg oral delayed release tablet: 1 tab(s) orally once a day (16 Oct 2021 20:57)  Procardia XL 30 mg oral tablet, extended release: 1 tab(s) orally every other day (16 Oct 2021 20:57)  Singulair 10 mg oral tablet: 1 tab(s) orally once a day, As Needed (16 Oct 2021 20:57)  Toprol-XL 25 mg oral tablet, extended release: 1 tab(s) orally once a day (16 Oct 2021 20:57)  Vitamin D3 25 mcg (1000 intl units) oral tablet: 1 tab(s) orally once a day (16 Oct 2021 20:57)  Zoloft 100 mg oral tablet: 1 tab(s) orally once a day (16 Oct 2021 20:57)      MEDICATIONS  (STANDING):  allopurinol 100 milliGRAM(s) Oral daily  aspirin enteric coated 81 milliGRAM(s) Oral daily  atorvastatin 10 milliGRAM(s) Oral at bedtime  cholecalciferol 1000 Unit(s) Oral daily  enoxaparin Injectable 40 milliGRAM(s) SubCutaneous daily  influenza   Vaccine 0.5 milliLiter(s) IntraMuscular once  lactated ringers. 1000 milliLiter(s) (75 mL/Hr) IV Continuous <Continuous>  lactobacillus acidophilus 1 Tablet(s) Oral daily  levothyroxine 100 MICROGram(s) Oral daily  minocycline 100 milliGRAM(s) Oral two times a day  pantoprazole    Tablet 40 milliGRAM(s) Oral before breakfast  sertraline 100 milliGRAM(s) Oral daily    MEDICATIONS  (PRN):  acetaminophen   Tablet .. 650 milliGRAM(s) Oral every 6 hours PRN Temp greater or equal to 38C (100.4F), Mild Pain (1 - 3)  aluminum hydroxide/magnesium hydroxide/simethicone Suspension 30 milliLiter(s) Oral every 4 hours PRN Dyspepsia  gabapentin 300 milliGRAM(s) Oral once PRN Phantom pain  melatonin 3 milliGRAM(s) Oral at bedtime PRN Insomnia  montelukast 10 milliGRAM(s) Oral daily PRN allergies  ondansetron Injectable 4 milliGRAM(s) IV Push every 8 hours PRN Nausea and/or Vomiting      Diet, DASH/TLC:   Sodium & Cholesterol Restricted (10-16-21 @ 19:40) [Active]          Vital Signs Last 24 Hrs  T(C): 37.2 (17 Oct 2021 04:48), Max: 37.3 (16 Oct 2021 15:30)  T(F): 98.9 (17 Oct 2021 04:48), Max: 99.1 (16 Oct 2021 15:30)  HR: 100 (17 Oct 2021 04:48) (82 - 100)  BP: 92/50 (17 Oct 2021 05:00) (78/51 - 109/58)  BP(mean): --  RR: 18 (17 Oct 2021 05:00) (17 - 19)  SpO2: 93% (17 Oct 2021 05:00) (91% - 99%)      10-16-21 @ 07:01  -  10-17-21 @ 07:00  --------------------------------------------------------  IN: 825 mL / OUT: 0 mL / NET: 825 mL              LABS:                        11.3   15.66 )-----------( 362      ( 16 Oct 2021 16:05 )             37.1     10-16    143  |  112<H>  |  17  ----------------------------<  120<H>  3.5   |  21<L>  |  0.56    Ca    7.7<L>      16 Oct 2021 16:05    TPro  5.1<L>  /  Alb  1.6<L>  /  TBili  0.4  /  DBili  x   /  AST  67<H>  /  ALT  33  /  AlkPhos  211<H>  10-16              WBC:  WBC Count: 15.66 K/uL (10-16 @ 16:05)      MICROBIOLOGY:  RECENT CULTURES:                  Sodium:  Sodium, Serum: 143 mmol/L (10-16 @ 16:05)      0.56 mg/dL 10-16 @ 16:05      Hemoglobin:  Hemoglobin: 11.3 g/dL (10-16 @ 16:05)      Platelets: Platelet Count - Automated: 362 K/uL (10-16 @ 16:05)      LIVER FUNCTIONS - ( 16 Oct 2021 16:05 )  Alb: 1.6 g/dL / Pro: 5.1 g/dL / ALK PHOS: 211 U/L / ALT: 33 U/L / AST: 67 U/L / GGT: x                 RADIOLOGY & ADDITIONAL STUDIES:      MICROBIOLOGY:  RECENT CULTURES:

## 2021-10-17 NOTE — PROGRESS NOTE ADULT - PROBLEM SELECTOR PLAN 4
Hx of HTN, on antihypertensives at home   - BP hypotensive on arrival 88/57, and remains in hypotensive range   - continue IVF D5 + NS +KCl 20 mEq at 70 cc/hr   - hold home Toprol 25mg qd  - hold home nifedipine 30mg qd (takes for vasodilator effects)  - monitor hemodynamics #Hypokalemia   - Mild, asymptomatic.   - KCl riders  - continue IVF D5 + NS +KCl 20 mEq at 70 cc/hr   - Monitor with daily labs     #Hypernatremia  -Mild, asymptomatic.   -Likely 2/2 to dehydration from decreased PO intake   - continue IVF D5 + NS +KCl 20 mEq at 70 cc/hr  - Monitor with daily labs

## 2021-10-17 NOTE — CONSULT NOTE ADULT - SUBJECTIVE AND OBJECTIVE BOX
Covering Dr Scott Ellis    HPI:  76 y/o F with PMHx of HTN, HLD, ABDOULAYE s/p stent, PVD s/p right AKA 2010, former smoker, depression, hypothyroidism, GERD, hx of MRSA infection, hx of TB chronically ill looking pt who presented to the ED complaining of weakness with worsening SOB, Pt unable to provide history. Per daughter pt has not been doing well over past month. + weight loss with decreased po intake. Also with n/v. Has seen GI outpt and per daughter some test in progress. Due to pt's worsening symptoms and weakness she was brought to the hospital to be evaluated. In ED Afebrile. CT chest with Left lower lobe mass suspicious for neoplasm and Moderate loculated left pleural effusion. + lymphadenopathy.  WBC 15.66. Pt on NRB Agitated and restless.     Infectious Disease consult was called to evaluate pt and for antibiotic management.      Past Medical & Surgical Hx:  PAST MEDICAL & SURGICAL HISTORY:  Hypertension  Peripheral vascular disease  HLD (hyperlipidemia)  Hypothyroidism  Esophageal reflux  History of depression  History of methicillin resistant staphylococcus aureus (MRSA)  History of tuberculosis  Peripheral vascular disease  S/P AKA (above knee amputation), right  H/O shoulder surgery  History of total hip replacement  H/O: hysterectomy      Social History--  EtOH: denies  Tobacco: former smoker  Drug Use: denies       FAMILY HISTORY:  Family history of MI (myocardial infarction) (Sibling)  In brother, brother passed away at 45 yo    Family history of valvular heart disease (Sibling)  In sister    Family history of early death (Sibling, Mother)  Brother at 43yo from MI, mother at 48 yo unknown cause        Allergies  No Known Allergies    Intolerances  NONE      Home Medications:  Acidophilus oral tablet: 1 tab(s) orally once a day (16 Oct 2021 20:57)  allopurinol 100 mg oral tablet: 1 tab(s) orally once a day (16 Oct 2021 20:57)  Aspirin Enteric Coated 81 mg oral delayed release tablet: 1 tab(s) orally once a day (16 Oct 2021 20:57)  gabapentin 300 mg oral tablet: 1 tab(s) orally once a day, As Needed for pain (16 Oct 2021 20:57)  levothyroxine 100 mcg (0.1 mg) oral tablet: 1 tab(s) orally once a day (16 Oct 2021 20:57)  Lipitor 10 mg oral tablet: 1 tab(s) orally once a day (16 Oct 2021 20:57)  minocycline 100 mg oral tablet: 1 tab(s) orally every 12 hours (16 Oct 2021 20:57)  pantoprazole 40 mg oral delayed release tablet: 1 tab(s) orally once a day (16 Oct 2021 20:57)  Procardia XL 30 mg oral tablet, extended release: 1 tab(s) orally every other day (16 Oct 2021 20:57)  Singulair 10 mg oral tablet: 1 tab(s) orally once a day, As Needed (16 Oct 2021 20:57)  Toprol-XL 25 mg oral tablet, extended release: 1 tab(s) orally once a day (16 Oct 2021 20:57)  Vitamin D3 25 mcg (1000 intl units) oral tablet: 1 tab(s) orally once a day (16 Oct 2021 20:57)  Zoloft 100 mg oral tablet: 1 tab(s) orally once a day (16 Oct 2021 20:57)      Current Inpatient Medications :    ANTIBIOTICS:   azithromycin  IVPB 500 milliGRAM(s) IV Intermittent every 24 hours  minocycline 100 milliGRAM(s) Oral two times a day  piperacillin/tazobactam IVPB.. 3.375 Gram(s) IV Intermittent every 8 hours      OTHER RELEVANT MEDICATIONS :  acetaminophen   Tablet .. 650 milliGRAM(s) Oral every 6 hours PRN  albuterol/ipratropium for Nebulization 3 milliLiter(s) Nebulizer every 6 hours  allopurinol 100 milliGRAM(s) Oral daily  aluminum hydroxide/magnesium hydroxide/simethicone Suspension 30 milliLiter(s) Oral every 4 hours PRN  aspirin enteric coated 81 milliGRAM(s) Oral daily  atorvastatin 10 milliGRAM(s) Oral at bedtime  benzocaine 15 mG/menthol 3.6 mG (Sugar-Free) Lozenge 1 Lozenge Oral two times a day PRN  cholecalciferol 1000 Unit(s) Oral daily  dextrose 5% + sodium chloride 0.45% with potassium chloride 20 mEq/L 1000 milliLiter(s) IV Continuous <Continuous>  gabapentin 300 milliGRAM(s) Oral once PRN  influenza   Vaccine 0.5 milliLiter(s) IntraMuscular once  levothyroxine 100 MICROGram(s) Oral daily  melatonin 3 milliGRAM(s) Oral at bedtime PRN  montelukast 10 milliGRAM(s) Oral daily PRN  ondansetron Injectable 4 milliGRAM(s) IV Push every 8 hours PRN  pantoprazole    Tablet 40 milliGRAM(s) Oral before breakfast  sertraline 100 milliGRAM(s) Oral daily      ROS:  Unable to obtain due to : poor historian      I&O's Detail    16 Oct 2021 07:01  -  17 Oct 2021 07:00  --------------------------------------------------------  IN:    Lactated Ringers: 825 mL  Total IN: 825 mL    OUT:  Total OUT: 0 mL    Total NET: 825 mL      17 Oct 2021 07:01  -  17 Oct 2021 23:29  --------------------------------------------------------  IN:    dextrose 5% + sodium chloride 0.45% w/ Additives: 280 mL    IV PiggyBack: 75 mL  Total IN: 355 mL    OUT:    Voided (mL): 0 mL  Total OUT: 0 mL    Total NET: 355 mL          Physical Exam:  Vital Signs Last 24 Hrs  T(C): 36.5 (17 Oct 2021 20:44), Max: 37.2 (17 Oct 2021 04:48)  T(F): 97.7 (17 Oct 2021 20:44), Max: 98.9 (17 Oct 2021 04:48)  HR: 117 (17 Oct 2021 22:00) (100 - 118)  BP: 98/57 (17 Oct 2021 22:00) (78/51 - 117/79)  BP(mean): 71 (17 Oct 2021 22:00) (67 - 95)  RR: 32 (17 Oct 2021 22:00) (17 - 32)  SpO2: 78% (17 Oct 2021 22:00) (78% - 93%)  Height (cm): 152.4 (10-17 @ 19:45)  Weight (kg): 48 (10-17 @ 19:45)  BMI (kg/m2): 20.7 (10-17 @ 19:45)  BSA (m2): 1.42 (10-17 @ 19:45)    General: weak restless SOB on NRB  Neck: supple, trachea midline  Lungs: Decreased, no wheeze/rhonchi  Cardiovascular: regular rate and rhythm, S1 S2  Abdomen: soft, nontender, ND, bowel sounds normal  Neurological: Confused restless  Skin: discoloration of skin ashy looking  Extremities: Right AKA  Left LE + edema    Labs:                         11.3   20.16 )-----------( 390      ( 17 Oct 2021 10:57 )             37.5   10-17    148<H>  |  119<H>  |  10  ----------------------------<  139<H>  4.0   |  21<L>  |  0.31<L>    Ca    7.3<L>      17 Oct 2021 21:25  Phos  3.1     10-17  Mg     1.8     10-17    TPro  4.9<L>  /  Alb  1.7<L>  /  TBili  0.5  /  DBili  x   /  AST  62<H>  /  ALT  31  /  AlkPhos  197<H>  10-17      RECENT CULTURES:    Culture - Blood (collected 16 Oct 2021 21:07)  Source: .Blood Blood-Venous  Preliminary Report (17 Oct 2021 22:01):    No growth to date.    Culture - Blood (collected 16 Oct 2021 21:07)  Source: .Blood Blood-Venous  Preliminary Report (17 Oct 2021 22:01):    No growth to date.      RADIOLOGY & ADDITIONAL STUDIES:    EXAM:  CT ABDOMEN AND PELVIS                          EXAM:  CT CHEST                            PROCEDURE DATE:  10/16/2021          INTERPRETATION:  CLINICAL INFORMATION: cough    COMPARISON: None.    CONTRAST/COMPLICATIONS:  IV Contrast: NONE  0cc administered   0 cc discarded  Oral Contrast: NONE  Complications: None reported at time of study completion    PROCEDURE:  CT of the Chest, Abdomen and Pelvis was performed.  Sagittal and coronal reformats were performed.    FINDINGS:  CHEST:  LUNGS AND LARGE AIRWAYS: Patent central airways. Groundglass opacities in the right lung. There is left lower lobe opacity measuring 3.8 x 2.8 cm in image 43 series 2. Emphysema. There is also a 2.3 x 3.3 cm left lower lobe medial opacity abutting the pericardium on series 2 image 38.  PLEURA: Moderate loculated left pleural effusion. Nodular left-sided pleural thickening is noted. Trace right pleural effusion.  VESSELS: Axillofemoral bypass noted.  HEART: Heart size is normal.  No pericardial effusion.  MEDIASTINUM AND ISAIAH: No lymphadenopathy.  CHEST WALL AND LOWER NECK: Within normal limits.    ABDOMEN AND PELVIS:  LIVER: Within normal limits.  BILE DUCTS: Normal caliber.  GALLBLADDER: Question of a contracted gallbladder.  SPLEEN: Within normal limits.  PANCREAS: Within normal limits.  ADRENALS: Within normal limits.  KIDNEYS/URETERS: Within normal limits.    BLADDER: Within normal limits.  REPRODUCTIVE ORGANS: Within normal limits.    BOWEL: No bowel obstruction.  PERITONEUM: No ascites.  VESSELS:  Within normal limits.  RETROPERITONEUM/LYMPH NODES: Prominent nondilated millimeter gastrohepatic node on series 2 image 56. Technique a 6 mm periceliac node on image 61.  ABDOMINAL WALL: Large right-sided ventral hernia containing small and large bowel extending into the right inguinal canal.  BONES: Bilateral hip arthroplasty.    IMPRESSION: Left lower lobe masslike opacities suspicious for neoplasm.    Nodular left-sided pleural thickening highly suspicious for neoplastic disease.    Moderate loculated left pleural effusion.    Prominent gastrohepatic and celiac lymph nodes.     Large right lower quadrant hernia extending into the inguinal canal.      Assessment :   76 y/o F with PMHx of HTN, HLD, ABDOULAYE s/p stent, PVD s/p right AKA 2010, former smoker, depression, hypothyroidism, GERD, hx of MRSA infection, hx of TB chronically ill admitted with severe sepsis with  acute hypoxic respiratory failure with possible lung malignancy with post obstructive pneumonia.  CT chest with Left lower lobe mass suspicious for neoplasm and moderate loculated left pleural effusion. ?malignant effusion + lymphadenopathy.  WBC 15.66. Pt on NRB Agitated and restless.  Had RRT sec worsening respiratory status and hypotension  Seen by ICU       Plan :   Cont Zosyn Zithromax  Fu cultures  Trend temps and cbc  Left thoracentesis for diagnostic and therapeutic purposes  Legionella and pneumococcal urinary antigen  Asp precautions  Upgrade to ICU due to high risk for further respiratory decompensation and intubation    DR Ellis to resume care in am    Continue with present regiment .  Approptiate use of antibiotics and adverse effects reviewed.        I have discussed the above plan of care with patient's daughter in detail.       > 45 minutes spent in direct patient care reviewing  the notes, lab data/ imaging , discussion with multidisciplinary team. All questions were addressed and answered to the best of my capacity .    Thank you for allowing me to participate in the care of your patient .      Parish Ruth MD  Infectious Disease  [phone]

## 2021-10-17 NOTE — PROGRESS NOTE ADULT - PROBLEM SELECTOR PLAN 2
History of smoking, recent 20lb weight loss, weakness, and decreased appetite. Suspicion of malignancy given CT findings.   - CT Chest: Groundglass opacities in the right lung. There is left lower lobe opacity measuring 3.8 x 2.8 cm. Emphysema. There is also a 2.3 x 3.3 cm left lower lobe medial opacity abutting the pericardium. Moderate loculated left pleural effusion. Nodular left-sided pleural thickening is noted. Trace right pleural effusion.  - Pt also with history of TB noted on outpatient records, unclear timing of diagnosis and if treated.   - US guided thoracentesis scheduled for tomorrow to evaluate pleural analysis of suspected malignant effusion. Will hold Lovenox in anticipation of procedure.   - Serum LDH and protein ordered for AM labs   - f/u pleural fluid analysis studies   - Pulm (Dr. Porter) consulted, recs appreciated  - Nutrition consult, will appreciate recs - CT Chest: Groundglass opacities in the right lung. There is left lower lobe opacity measuring 3.8 x 2.8 cm. Emphysema. There is also a 2.3 x 3.3 cm left lower lobe medial opacity abutting the pericardium. Moderate loculated left pleural effusion. Nodular left-sided pleural thickening is noted. Trace right pleural effusion.  - plan as above  - will f/up pleural fluid analysis / cytology  - work-up further as pt stabilizes from likely overlying sepsis / PNA

## 2021-10-17 NOTE — CONSULT NOTE ADULT - ASSESSMENT
[facility] 1945 [facility] EJ MARCH     Initial evaluation/Pulmonary Critical Care consultation requested on 10/17/2021  by Dr HAMILTON  from Dr Lozano   Patient examined chart reviewed    HOSPITAL ADMISSION   PATIENT CAME  FROM (if information available)      [facility] 75[facility] 10/16/2021 1947 DR EJ MARCH     REVIEW OF SYMPTOMS      Able to give (reliable) ROS  NO     PHYSICAL EXAM    HEENT Unremarkable  atraumatic   RESP Fair air entry EXP prolonged    Harsh breath sound Resp distres mild   CARDIAC S1 S2 No S3     NO JVD    ABDOMEN SOFT BS PRESENT NOT DISTENDED No hepatosplenomegaly   PEDAL EDEMA present No calf tenderness  NO rash                                      PATIENT PRESENTATION.  74 yo white female with H/O HTN, HLD, PVD and Right AKA who over the past few weeks has been experiencing gradual but increasing weakness, fatigue, nausea, occasional post prandial vomiting and slight cough. In addition patient has been noting weight loss. No fever or chills. No abdominal pains.  Pulm consulted 10/17/2021 as ct showed lll mass annd loculated effsn.       AGE/SEX/DOA.   75 f 10/16/2021     DOA FROM [facility].   10/16/2021 Pt p/w generalized weakness x 3 days w/ decreased o2 sat on EMS arrival Per EMS pt was 88% on room air w/    PMH/PSH.  Hypertension   Peripheral vascular disease.   S/P AKA (above knee amputation), right.       PROBLEMS.   HYPOXEMIA poa.        SEPSIS poa.  LACTICEMIA poa.   PNEUMONIA lll poa.   PL THCKENING 10/16 CT ch.   PL EFFS l 10/16 CT ch   LARGE ING HERNIA r poa.       BEST PRACTICE ISSUES.                                                  HEAD OF BED ELEVATION. Yes  DVT PROPHYLAXIS.  held for poss thorac                                        CARTER PROPHYLAXIS.  protonix 40 10/17/2021                                                                                        DIET.            mech soft honey 10/17/2021               INFECTION PROPHYLAXIS.        GENERAL ISSUES  GOC ADVANCED DIRECTIVE.                                         ALLERGY.   nka                          WEIGHT.      10/17/2021 49                              BMI.                10/17/2021 23    PATIENT DATA   TUBES  PROCEDURES.        ABG.   10/17/2021 50% vm 732/42/58     OXYGENATION.     10/17/2021 2l 93%              VITALS/IO/VENT/DRIPS.     10/17/2021 afeb 110 90/50        PATIENT DATA PROBLEM ASSESSMENT/RECOMMENDATIONS.    SEPSIS poa.  PNEUMONIA lll poa.   W 10/17/2021 s 15  pr 11/16 pr 3.8  ct 10/17/2021 ct ch lll mass opac   azithro 10/17/2021   zosyn 10/17/2021   minocycl 100.2 10/17/2021  check speech     HEMODYNAMICS.   target map 65 (+)    LACTICEMIA poa.   la 11/16 la 2.5   d5 1/2 ns 20 k 70   monitor     COPD.   duoneb 10/17/2021    RESP FAILURE HYPOXEMIC poa.  10/17/2021 50% vm 732/42/58   10/17/2021 2l 93%   hypoxemia likely sec pneum  target po 90-95%     PL THCKENING 10/16 CT ch.   ct 10/17/2021 ct ch l pl thickening     PL EFFS l 10/16 CT ch   10/16 ct ch   lll mass opac susp for neoplasm   nodular l sided pl thieckening   mod loculated l effs   prominent gastrohepatic and celiac ln   large rlq hernia ing canl   suspect cancer given elevated cea and enlarged rp ln  Thoracentesis ordered    LARGE ING HERNIA r poa.   No obstructd or incarcerated                 TIME SPENT   Over 55 minutes aggregate care time spent on encounter; activities included   direct patient care, counseling and/or coordinating care reviewing notes, lab data/ imaging , discussion with multidisciplinary team/ patient  /family and explaining in detail risks, benefits, alternatives  of the recommendations     TELESCA EGISIA 75f 10/16/2021 1945 DR EJ MARCH

## 2021-10-17 NOTE — CHART NOTE - NSCHARTNOTEFT_GEN_A_CORE
Resident Rapid Response Note    Rapid response was called at 1908 for hypoxia and hypotension.    Events leading up to Rapid Response: Attending at bedside found patient to be hypoxic in low 80's on 50% venturi mask. Systolic blood pressure 42, improved to 62.    Patient was seen and examined at the bedside by the rapid response team and resident medicine team. Dr. Moore / ICU PA at bedside.      Rapid Response Vital Signs:  BP: 42-->62 systolic with doppler  HR: 118  RR: 26  SpO2: 80% on 50% venturi mask  Temp:    Vital Signs Last 24 Hrs  T(C): 37.1 (17 Oct 2021 11:23), Max: 37.2 (17 Oct 2021 04:48)  T(F): 98.7 (17 Oct 2021 11:23), Max: 98.9 (17 Oct 2021 04:48)  HR: 110 (17 Oct 2021 11:49) (85 - 118)  BP: 90/55 (17 Oct 2021 11:23) (78/51 - 109/58)  BP(mean): --  RR: 19 (17 Oct 2021 11:23) (17 - 19)  SpO2: 90% (17 Oct 2021 11:49) (90% - 98%)    Physical Exam:  General: increased respiratory effort, using accessory muscles, in distress  HEENT: NCAT, PERRLA, EOMI bl, moist mucous membranes   Neck: Supple, nontender, no mass  Neurology: A&Ox3, nonfocal, CN II-XII grossly intact, sensation intact  Respiratory: decreased breath sounds bilaterally, increased work of breathing  CV: tachycardic, regular rhythm, S1/S2 present, no murmurs, rubs, or gallops  Abdominal: Soft, nontender, non-distended, normoactive bowel sounds  Extremities: no edema, cyanosis, or calf tenderness; Right AKA; no LLE edema and TTP noted  Skin: ashen/dusky appearance of face related to chronic minocycline use       Assessment/Plan:  -Given NS IV Bolus ~1L  -Patient placed on NRB, then BIPAP  -AB.22/54/65/22  -Transfer to ICU, further management per ICU team  -Dr. Moore at bedside Resident Rapid Response Note    Rapid response was called at 1908 for hypoxia and hypotension.    Events leading up to Rapid Response: Attending at bedside found patient to be hypoxic in low 80's on 50% venturi mask. Systolic blood pressure 42, improved to 62.    Patient was seen and examined at the bedside by the rapid response team and resident medicine team. Dr. Moore / ICU PA at bedside.      Rapid Response Vital Signs:  BP: 42-->62 systolic with doppler  HR: 118  RR: 26  SpO2: 80% on 50% venturi mask  Temp:    Vital Signs Last 24 Hrs  T(C): 37.1 (17 Oct 2021 11:23), Max: 37.2 (17 Oct 2021 04:48)  T(F): 98.7 (17 Oct 2021 11:23), Max: 98.9 (17 Oct 2021 04:48)  HR: 110 (17 Oct 2021 11:49) (85 - 118)  BP: 90/55 (17 Oct 2021 11:23) (78/51 - 109/58)  BP(mean): --  RR: 19 (17 Oct 2021 11:23) (17 - 19)  SpO2: 90% (17 Oct 2021 11:49) (90% - 98%)    Physical Exam:  General: increased respiratory effort, using accessory muscles, in distress  HEENT: NCAT, PERRLA, EOMI bl, moist mucous membranes   Neck: Supple, nontender, no mass  Neurology: A&Ox3, nonfocal, CN II-XII grossly intact, sensation intact  Respiratory: decreased breath sounds bilaterally, increased work of breathing  CV: tachycardic, regular rhythm, S1/S2 present, no murmurs, rubs, or gallops  Abdominal: Soft, nontender, non-distended, normoactive bowel sounds  Extremities: no edema, cyanosis, or calf tenderness; Right AKA; no LLE edema and TTP noted  Skin: ashen/dusky appearance of face related to chronic minocycline use       Assessment/Plan:  -Given NS IV Bolus ~1L  -Patient placed on NRB, then BIPAP  -AB.//65/22  -Transfer to ICU, further management per ICU team  -Dr. Moore at bedside    ______________________________________________________________________________________________  ______________________________________________________________________________________________________________    Pt seen and examined at bedside @22:00 for follow up of rapid response called at 1908, called for hypoxia and hypotension. Patient seen and examined in the ICU. Patient is on BIPAP, drowsy.      PHYSICAL EXAM:  General: Well developed, well nourished, NAD  HEENT: NCAT, PERRLA, EOMI B/L, moist mucous membranes   Neck: Supple, nontender, no mass  Neurology: A&Ox3, nonfocal, CN II-XII grossly intact, sensation intact  Respiratory: CTA B/L  CV: RRR, +S1/S2, no murmurs, rubs or gallops  Abdominal: Soft, nontender, non-distended, normoactive bowel sounds  Extremities: no edema, cyanosis, or calf tenderness; Right AKA; no LLE edema and TTP noted  Skin: ashen/dusky appearance of face related to chronic minocycline use       Vital Signs Last 24 Hrs  T(C): 36.5 (17 Oct 2021 20:44), Max: 37.2 (17 Oct 2021 04:48)  T(F): 97.7 (17 Oct 2021 20:44), Max: 98.9 (17 Oct 2021 04:48)  HR: 117 (17 Oct 2021 22:00) (100 - 118)  BP: 98/57 (17 Oct 2021 22:00) (78/51 - 117/79)  BP(mean): 71 (17 Oct 2021 22:00) (67 - 95)  RR: 32 (17 Oct 2021 22:00) (17 - 32)  SpO2: 78% (17 Oct 2021 22:00) (78% - 93%)                            11.3   20.16 )-----------( 390      ( 17 Oct 2021 10:57 )             37.5     10    148<H>  |  119<H>  |  10  ----------------------------<  139<H>  4.0   |  21<L>  |  0.31<L>    Ca    7.3<L>      17 Oct 2021 21:25  Phos  3.1     10-17  Mg     1.8     10-17    TPro  4.9<L>  /  Alb  1.7<L>  /  TBili  0.5  /  DBili  x   /  AST  62<H>  /  ALT  31  /  AlkPhos  197<H>  10-17            A/P  74 y/o F with PMHx of HTN, HLD, PVD s/p right AKA , former smoker, depression, hypothyroidism, GERD, hx of MRSA infection, hx of TB presented to the ED complaining of weakness was found to be in respiratory distress w/ O2 sat of 88%. CT chest performed in ED showed presence of new lung mass in left lower lobe suspicious for neoplasm w/ moderate left pleural effusion. Patient admitted for weakness and acute hypoxic respiratory failure with left lung mass-like opacities, rule out malignancy +/- superimposed pneumonia. Patient is now in ICU following rapid response for management of acute hypoxic respiratory failure.  - Continue on BIPAP  - Care per ICU team  - Infectious disease following, recs appreciated

## 2021-10-17 NOTE — PROGRESS NOTE ADULT - ASSESSMENT
74 y/o F with PMHx of HTN, HLD, PVD s/p right AKA 2010, former smoker, depression, hypothyroidism, GERD, hx of MRSA infection, hx of TB presented to the ED complaining of weakness was found to be in respiratory distress w/ O2 sat of 88%. CT chest performed in ED showed presence of new lung mass in left lower lobe suspicious for neoplasm w/ moderate left pleural effusion. Patient admitted for weakness and acute hypoxic respiratory failure with left lung mass-like opacities, rule out malignancy +/- superimposed pneumonia. 74yo F with PMHx of HTN, HLD, PVD s/p right AKA 2010, former smoker, depression, hypothyroidism, GERD, hx of MRSA infection, hx of TB presented to the ED complaining of weakness and SOB a/w acute hypoxic respiratory failure with left lung mass-like opacities, rule out malignancy with likely evolving sepsis due to suspected aspiration PNA with possible parapneumonic effusion.

## 2021-10-17 NOTE — PROGRESS NOTE ADULT - PROBLEM SELECTOR PLAN 5
#Hypokalemia   - Mild, asymptomatic.   - KCl riders  - continue IVF D5 + NS +KCl 20 mEq at 70 cc/hr   - Monitor with daily labs     #Hypernatremia  -Mild, asymptomatic.   -Likely 2/2 to dehydration from decreased PO intake   - continue IVF D5 + NS +KCl 20 mEq at 70 cc/hr  - Monitor with daily labs Chronic, stable   - C/w asa + lipitor   - C/w gabapentin PRN for phantom limb pain  - Holding home nifedipine for hypotension

## 2021-10-17 NOTE — PROGRESS NOTE ADULT - REASON FOR ADMISSION
weakness, hypoxia acute hypoxic respiratory failure likely due to PNA with possible underlying lung cancer

## 2021-10-18 NOTE — DIETITIAN INITIAL EVALUATION ADULT. - OTHER INFO
Pt admit with weakness/fatigue with wt loss PTA(amount unknown), with hx AKA. Found to have L lobe mass, likely malignant, possible malignant pleural effusion. await thoracentesis. Per EMR suspected asp Pn with +coughing on thin liquids. SLP eval when resp status improves; currently on BIPAP. +vomiting PTA, found to have hernia. St 3 pressure injury on sacrum. No MOLST noted, await further GOC. Low BP noted.NFPE(limited) reveals muscle/fat loss.

## 2021-10-18 NOTE — PROGRESS NOTE ADULT - ASSESSMENT
75 year old female with acute hypoxic respiratory failure secondary to PNA with high suspicion for possible malignancy given findings on cat scan.    planned for thoracentesis  monitor resp status - off NIV as tolerated  ct imaging reviewed  on EMP ABX   on Bronchodilators  oral and skin care  GOC discussion

## 2021-10-18 NOTE — PROGRESS NOTE ADULT - PROBLEM SELECTOR PLAN 5
Chronic, stable   - C/w asa + lipitor   - C/w gabapentin PRN for phantom limb pain  - Holding home nifedipine for hypotension #Hypokalemia   - Resolved s/p KCl riders   - Monitor with daily labs     #Hypernatremia  -Mild, asymptomatic.   -Likely 2/2 to dehydration from decreased PO intake   - continue IVF D5NS @ 100 cc/hr   - Monitor with daily labs

## 2021-10-18 NOTE — PROGRESS NOTE ADULT - PROBLEM SELECTOR PLAN 2
- CT Chest: Groundglass opacities in the right lung. There is left lower lobe opacity measuring 3.8 x 2.8 cm. Emphysema. There is also a 2.3 x 3.3 cm left lower lobe medial opacity abutting the pericardium. Moderate loculated left pleural effusion. Nodular left-sided pleural thickening is noted. Trace right pleural effusion.  - plan as above  - will f/up pleural fluid analysis / cytology  - work-up further as pt stabilizes from likely overlying sepsis / PNA Patient presented with worsening SOB, PRO. SpO2 88% with EMS, now worsening acidosis, hypoxemia and hypercarbia. Now on mechanical ventilation in ICU.   - Concern for possible malignancy with superimposed pneumonia given CT findings.  - Pt admits that she coughs when drinking thin liquids -suspect pt has chronic aspiration  - Afebrile, but with worsening leukocytosis with left shift, high pro-calcitonin and lactate.   - CXR: worsening L pleural effusion and bilateral patchy opacities.   - CT Chest: Groundglass opacities in the right lung. There is left lower lobe opacity measuring 3.8 x 2.8 cm. Emphysema. There is also a 2.3 x 3.3 cm left lower lobe medial opacity abutting the pericardium. Moderate loculated left pleural effusion. Nodular left-sided pleural thickening is noted. Trace right pleural effusion.  - Pt also with history of ?TB --- discussed with pt's daughter who stated that pt had work-up for imaging finding and they were told that pt likely had small TB scar from childhood and that she had no evidence of TB infection here. Pt has been in the [place] for [place] and not traveled out.  - ABG during rapid response with mixed respiratory and metabolic acidosis. Repeat ABG 7.13/60/34/20, worsening resulting in intubation.   - CRP 29, CEA 35.7, B12 and folate wnl   - US guided thoracentesis to evaluate pleural analysis of effusion to be done once patient clinically stable -- concern pt may have parapneumonic effusion or empyema ; can also be malignant effusion (or both)  - pneumonia management as above   -  continue with albuterol, ipratropium, and budesonide nebs. Methylprednisolone 40 mg IV q8h  - f/u MRSA nares PCR, legionella urine Ag  - f/u blood and urine cultures   - Pulm (Dr. Porter/Blake) consulted, recs appreciated   - ID consult (Dr. Ruth), recs appreciated Patient presented with worsening SOB, PRO. SpO2 88% with EMS, now worsening acidosis, hypoxemia and hypercarbia despite BiPAP.   - Now on mechanical ventilation in ICU for acute hypoxic and hypercarbic respiratory failure.  - Concern for possible malignancy with superimposed pneumonia given CT findings.  - Pt admitted that she coughs when drinking thin liquids -suspect pt has chronic aspiration  - Afebrile, but with worsening leukocytosis with left shift, high pro-calcitonin and lactate.   - CXR: worsening L pleural effusion and bilateral patchy opacities.   - CT Chest: Groundglass opacities in the right lung. There is left lower lobe opacity measuring 3.8 x 2.8 cm. Emphysema. There is also a 2.3 x 3.3 cm left lower lobe medial opacity abutting the pericardium. Moderate loculated left pleural effusion. Nodular left-sided pleural thickening is noted. Trace right pleural effusion.  - Pt also with recorded history of ?TB --- discussed with pt's daughter who stated that pt had work-up for imaging finding and they were told that pt likely had small TB scar from childhood and that she had no evidence of TB infection here. [place] the [place] and not traveled out.  - ABG during rapid response with mixed respiratory and metabolic acidosis. Repeat ABG 7.13/60/34/20, worsening resulting in intubation.   - CRP 29, CEA 35.7, B12 and folate wnl   - US guided thoracentesis to evaluate pleural analysis of effusion to be done once patient clinically stable -- concern pt may have parapneumonic effusion or empyema ; can also be malignant effusion (or both)  - pneumonia management as above   - continue with albuterol, ipratropium, and budesonide nebs. Methylprednisolone 40 mg IV q8h  - MRSA nares PCR, legionella urine Ag both negative  - BCx NGTD  - Pulm (Dr. Porter/Blake) consulted, recs appreciated   - ID consult (Dr. Ruth/Caleb), recs appreciated

## 2021-10-18 NOTE — PROGRESS NOTE ADULT - ASSESSMENT
74yo F with PMHx of HTN, HLD, PVD s/p right AKA 2010, former smoker, depression, hypothyroidism, GERD, hx of MRSA infection, hx of TB presented to the ED complaining of weakness and SOB a/w acute hypoxic respiratory failure with left lung mass-like opacities, rule out malignancy with likely evolving sepsis due to suspected aspiration PNA with possible parapneumonic effusion or empyema. Course c/b hypotension and mixed metabolic and respiratory acidosis with worsening hypercarbia and hypoxemia requiring mechanical ventilation and ICU level care. 74yo F with PMHx of HTN, HLD, PVD s/p right AKA 2010, former smoker, depression, hypothyroidism, GERD, hx of MRSA infection, hx of TB presented to the ED complaining of weakness and SOB a/w acute hypoxic respiratory failure with left lung mass-like opacities, rule out malignancy with likely evolving septic shock due to suspected aspiration PNA with suspected parapneumonic effusion or empyema course c/b hypotension and mixed metabolic and respiratory acidosis with worsening hypercarbic and hypoxemic respiratory failure requiring mechanical ventilation and ICU level care.

## 2021-10-18 NOTE — PROCEDURE NOTE - NSPPROCSEDTRACH_RESP_ALL_CORE
Meghan Love MD

## 2021-10-18 NOTE — PROGRESS NOTE ADULT - SUBJECTIVE AND OBJECTIVE BOX
Date/Time Patient Seen:  		  Referring MD:   Data Reviewed	       Patient is a 75y old  Female who presents with a chief complaint of weakness, hypoxia (17 Oct 2021 21:39)      Subjective/HPI     PAST MEDICAL & SURGICAL HISTORY:  No pertinent past medical history    Hypertension    Peripheral vascular disease    HLD (hyperlipidemia)    Hypothyroidism    Esophageal reflux    History of depression    History of methicillin resistant staphylococcus aureus (MRSA)    History of tuberculosis    Peripheral vascular disease    S/P AKA (above knee amputation), right    H/O shoulder surgery    History of total hip replacement    H/O: hysterectomy          Medication list         MEDICATIONS  (STANDING):  albuterol/ipratropium for Nebulization 3 milliLiter(s) Nebulizer every 6 hours  allopurinol 100 milliGRAM(s) Oral daily  aspirin enteric coated 81 milliGRAM(s) Oral daily  atorvastatin 10 milliGRAM(s) Oral at bedtime  azithromycin  IVPB 500 milliGRAM(s) IV Intermittent every 24 hours  cholecalciferol 1000 Unit(s) Oral daily  dextrose 5% + sodium chloride 0.45% with potassium chloride 20 mEq/L 1000 milliLiter(s) (70 mL/Hr) IV Continuous <Continuous>  influenza   Vaccine 0.5 milliLiter(s) IntraMuscular once  lactobacillus acidophilus 1 Tablet(s) Oral daily  levothyroxine 100 MICROGram(s) Oral daily  minocycline 100 milliGRAM(s) Oral two times a day  pantoprazole    Tablet 40 milliGRAM(s) Oral before breakfast  piperacillin/tazobactam IVPB.. 3.375 Gram(s) IV Intermittent every 8 hours  sertraline 100 milliGRAM(s) Oral daily    MEDICATIONS  (PRN):  acetaminophen   Tablet .. 650 milliGRAM(s) Oral every 6 hours PRN Temp greater or equal to 38C (100.4F), Mild Pain (1 - 3)  aluminum hydroxide/magnesium hydroxide/simethicone Suspension 30 milliLiter(s) Oral every 4 hours PRN Dyspepsia  benzocaine 15 mG/menthol 3.6 mG (Sugar-Free) Lozenge 1 Lozenge Oral two times a day PRN Sore Throat  gabapentin 300 milliGRAM(s) Oral once PRN Phantom pain  melatonin 3 milliGRAM(s) Oral at bedtime PRN Insomnia  montelukast 10 milliGRAM(s) Oral daily PRN allergies  ondansetron Injectable 4 milliGRAM(s) IV Push every 8 hours PRN Nausea and/or Vomiting         Vitals log        ICU Vital Signs Last 24 Hrs  T(C): 37.3 (18 Oct 2021 04:00), Max: 37.3 (18 Oct 2021 04:00)  T(F): 99.1 (18 Oct 2021 04:00), Max: 99.1 (18 Oct 2021 04:00)  HR: 117 (18 Oct 2021 05:00) (106 - 120)  BP: 93/52 (18 Oct 2021 05:00) (90/55 - 117/79)  BP(mean): 68 (18 Oct 2021 05:00) (66 - 95)  ABP: --  ABP(mean): --  RR: 38 (18 Oct 2021 05:00) (19 - 45)  SpO2: 92% (18 Oct 2021 05:00) (60% - 93%)           Input and Output:  I&O's Detail    16 Oct 2021 07:01  -  17 Oct 2021 07:00  --------------------------------------------------------  IN:    Lactated Ringers: 825 mL  Total IN: 825 mL    OUT:  Total OUT: 0 mL    Total NET: 825 mL      17 Oct 2021 07:01  -  18 Oct 2021 06:06  --------------------------------------------------------  IN:    dextrose 5% + sodium chloride 0.45% w/ Additives: 770 mL    IV PiggyBack: 125 mL  Total IN: 895 mL    OUT:    Voided (mL): 250 mL  Total OUT: 250 mL    Total NET: 645 mL          Lab Data                        11.3   20.16 )-----------( 390      ( 17 Oct 2021 10:57 )             37.5     10-17    148<H>  |  119<H>  |  10  ----------------------------<  139<H>  4.0   |  21<L>  |  0.31<L>    Ca    7.3<L>      17 Oct 2021 21:25  Phos  3.1     10-17  Mg     1.8     10-17    TPro  4.9<L>  /  Alb  1.7<L>  /  TBili  0.5  /  DBili  x   /  AST  62<H>  /  ALT  31  /  AlkPhos  197<H>  10-17    ABG - ( 18 Oct 2021 00:28 )  pH, Arterial: 7.31  pH, Blood: x     /  pCO2: 46    /  pO2: 74    / HCO3: 23    / Base Excess: -3.1  /  SaO2: 96.2                    Review of Systems	      Objective     Physical Examination    heart s1s2  lung dec BS      Pertinent Lab findings & Imaging      Kilo:  NO   Adequate UO     I&O's Detail    16 Oct 2021 07:01  -  17 Oct 2021 07:00  --------------------------------------------------------  IN:    Lactated Ringers: 825 mL  Total IN: 825 mL    OUT:  Total OUT: 0 mL    Total NET: 825 mL      17 Oct 2021 07:01  -  18 Oct 2021 06:06  --------------------------------------------------------  IN:    dextrose 5% + sodium chloride 0.45% w/ Additives: 770 mL    IV PiggyBack: 125 mL  Total IN: 895 mL    OUT:    Voided (mL): 250 mL  Total OUT: 250 mL    Total NET: 645 mL               Discussed with:     Cultures:	        Radiology

## 2021-10-18 NOTE — DIETITIAN INITIAL EVALUATION ADULT. - PROBLEM SELECTOR PLAN 2
Chronic, stable   - C/w asa + lipitor   - C/w gabapentin PRN for phantom limb pain  - hold nifedipine

## 2021-10-18 NOTE — SWALLOW BEDSIDE ASSESSMENT ADULT - SWALLOW EVAL: CURRENT DIET
Dysphagia 2 with honey thick liquids per MD order
mechanical soft and honey thick liquids, as per MD order

## 2021-10-18 NOTE — CHART NOTE - NSCHARTNOTEFT_GEN_A_CORE
Asked to camera in to evaluate patient.  Patient with persistent hypotension and hypoxemia with disagreement between axillary arterial line and automated BP cuff.  Patient currently sedated on propofol breathing on vent with RR 34. However, examination of vent waveforms shows significant autoPEEP at that rate given very slow uptake on expiratory limb (c/w obstructive ventilatory defect), peaks in the mid-40s, and evidence of attempts at abdominal breathing. When the patient triggers, this obviously doubles the peak pressures.  ABG 7.18/55/51/20 BE-8 on 100% PEEP 8, which is c/w respiratory and metabolic acidosis as well as hypoxemia.  Review of imaging (my read) shows significant infiltrates with confluent centrilobular emphysema. CT chest done 10/16 also shows L sided ?loculated effusion with pleural thickening, possible round mass  The etiology of the patient's current hypoxemia is likely multifactorial. Underlying destruction of pulmonary architecture / emphysema in combination with infiltrate and lung compression from extrinsic effusion certainly plays a part. It is possible that autoPEEP is also at least partially causing hypotension / increased plateau pressure as well.  Recommend:  - Increase sedation with ketamine and fentanyl (for analgesia and possible bronchodilation)  - Decrease propofol, which might help hypotension (currently on Levophed and vasopressin)  - Paralyze to reduce abdominal efforts  - We will likely need to decrease RR and titrate to expiratory limb on vent to prevent further autoPEEP  - If this decreases plateau, then we can go up on PEEP and target driving pressure <15  - For ARDS target VT is 280-370, but this is not traditional ARDS given COPD / emphysema, so a slightly higher VT (targeting ~8-9cc/kg IBW) is ok  - Will continue to monitor and make any additional changes after paralysis, decreased RR and possible increased PEEP  - Given significant underlying pulmonary pathology, overall prognosis appears poor.

## 2021-10-18 NOTE — PROGRESS NOTE ADULT - ASSESSMENT
76 y/o F w/ PMHx of HTN, HLD, carotid stenosis s/p stent, PVD s/p R AKA and right axillary-femoral bypass, former smoker, COPD, depression, hypothyroidism, and prior MRSA infection on chronic minocycline therapy admitted w/:    1. Acute hypoxic/hypercapnic respiratory failure  2. ARDS  3. Lobar pneumonia  4. Shock  5. Left pleural effusion, loculated  6. R/o malignancy    PLAN:  - In the setting of profound hypoxemia and vent dyssynchrony, will paralyze w/ nimbex.  - Deep sedation w/ ketamine and fentanyl infusions while paralyzed. Will attempt to wean propofol in the setting of profound hypotension.   - Actively titrating levophed to maintain MAP > 65. Fixed dose vasopressin added. Monitor end points of perfusion.  - AutoPEEP and airway pressures improved s/p initiation of paralytic. Will repeat ABG, and attempt to increase PEEP to improve oxygenation as tolerated by airway pressures  - Start solumedrol 40mg IV q12 hrs plus inhaled bronchodilators.  - Start tube feeds in the AM as tolerated.    - S/p ~3 L IVF today. Remains oliguric. Likely ATN in the setting of hypotension.  -       - Chemical DVT prophylaxis w/ heparin.    Discussed w/ eICU attending, Dr. Gonzáles. 74 y/o F w/ PMHx of HTN, HLD, carotid stenosis s/p stent, PVD s/p R AKA and right axillary-femoral bypass, former smoker, COPD, depression, hypothyroidism, and prior MRSA infection on chronic minocycline therapy admitted w/:    1. Acute hypoxic/hypercapnic respiratory failure  2. ARDS  3. Lobar pneumonia  4. Shock  5. Left pleural effusion, loculated  6. R/o malignancy    PLAN:  - In the setting of profound hypoxemia and vent dyssynchrony, will paralyze w/ nimbex.  - Deep sedation w/ ketamine and fentanyl infusions while paralyzed. Will attempt to wean propofol in the setting of profound hypotension.   - Actively titrating levophed to maintain MAP > 65. Fixed dose vasopressin added. Tachycardia is worsening, although this is likely compensatory, can consider addition of phenylephrine. Monitor end points of perfusion.  - AutoPEEP and airway pressures improved s/p initiation of paralytic. Will repeat ABG, and attempt to increase PEEP to improve oxygenation as tolerated by airway pressures. Goal SpO2 88-92%.  - Start solumedrol 40mg IV q12 hrs plus inhaled bronchodilators.  - Start tube feeds in the AM as tolerated.  - S/p ~3 L IVF today. Remains oliguric. Likely ATN in the setting of hypotension. Will avoid lasix at this time given profound hypotension.  - Continue zosyn for multifocal PNA. Vanco held since MRSA PCR negative. Zithromax discontinued since urine legionella negative. Blood cultures w/o growth. F/u cultures from bronchoscopy.  - Chemical DVT prophylaxis w/ heparin.    Prognosis is extremely poor.    Discussed w/ eICU attending, Dr. Gonzáles.

## 2021-10-18 NOTE — PROGRESS NOTE ADULT - SUBJECTIVE AND OBJECTIVE BOX
Patient is a 76 y/o female who presents with a chief complaint of weakness, hypoxia (18 Oct 2021 14:29)    BRIEF HOSPITAL COURSE: ***    Events last 24 hours: ***    PAST MEDICAL & SURGICAL HISTORY:  Hypertension  Peripheral vascular disease  HLD (hyperlipidemia)  Hypothyroidism  Esophageal reflux  History of depression  History of methicillin resistant staphylococcus aureus (MRSA)  History of tuberculosis  Peripheral vascular disease  S/P AKA (above knee amputation), right  H/O shoulder surgery  History of total hip replacement  H/O: hysterectomy    Review of Systems:  Unable to obtain. Intubated/sedated.    Medications:  azithromycin  IVPB 500 milliGRAM(s) IV Intermittent every 24 hours  piperacillin/tazobactam IVPB.. 3.375 Gram(s) IV Intermittent every 8 hours  furosemide   Injectable 60 milliGRAM(s) IV Push once  norepinephrine Infusion 0.15 MICROgram(s)/kG/Min IV Continuous <Continuous>  albuterol/ipratropium for Nebulization 3 milliLiter(s) Nebulizer every 6 hours  montelukast 10 milliGRAM(s) Oral daily PRN  acetaminophen   Tablet .. 650 milliGRAM(s) Oral every 6 hours PRN  cisatracurium Infusion 3 MICROgram(s)/kG/Min IV Continuous <Continuous>  fentaNYL    Injectable 12.5 MICROGram(s) IV Push once PRN  fentaNYL   Infusion... 0.5 MICROgram(s)/kG/Hr IV Continuous <Continuous>  gabapentin 300 milliGRAM(s) Oral once PRN  melatonin 3 milliGRAM(s) Oral at bedtime PRN  ondansetron Injectable 4 milliGRAM(s) IV Push every 8 hours PRN  propofol Infusion 10 MICROgram(s)/kG/Min IV Continuous <Continuous>  sertraline 100 milliGRAM(s) Oral daily  aspirin enteric coated 81 milliGRAM(s) Oral daily  heparin   Injectable 5000 Unit(s) SubCutaneous every 12 hours  aluminum hydroxide/magnesium hydroxide/simethicone Suspension 30 milliLiter(s) Oral every 4 hours PRN  pantoprazole    Tablet 40 milliGRAM(s) Oral before breakfast  allopurinol 100 milliGRAM(s) Oral daily  atorvastatin 10 milliGRAM(s) Oral at bedtime  levothyroxine 100 MICROGram(s) Oral daily  methylPREDNISolone sodium succinate Injectable 40 milliGRAM(s) IV Push every 12 hours  vasopressin Infusion 0.04 Unit(s)/Min IV Continuous <Continuous>  cholecalciferol 1000 Unit(s) Oral daily  dextrose 5% + lactated ringers. 1000 milliLiter(s) IV Continuous <Continuous>  influenza   Vaccine 0.5 milliLiter(s) IntraMuscular once  benzocaine 15 mG/menthol 3.6 mG (Sugar-Free) Lozenge 1 Lozenge Oral two times a day PRN  chlorhexidine 0.12% Liquid 15 milliLiter(s) Oral Mucosa every 12 hours  chlorhexidine 4% Liquid 1 Application(s) Topical <User Schedule>  lactobacillus acidophilus 1 Tablet(s) Oral daily    Mode: AC/ CMV (Assist Control/ Continuous Mandatory Ventilation)  RR (machine): 34  TV (machine): 400  FiO2: 100  PEEP: 8  ITime: 1  MAP: 21  PIP: 32    ICU Vital Signs Last 24 Hrs  T(C): 38.7 (18 Oct 2021 20:00), Max: 38.7 (18 Oct 2021 20:00)  T(F): 101.7 (18 Oct 2021 20:00), Max: 101.7 (18 Oct 2021 20:00)  HR: 143 (18 Oct 2021 21:25) (106 - 145)  BP: 88/58 (18 Oct 2021 21:00) (69/50 - 129/67)  BP(mean): 69 (18 Oct 2021 21:00) (50 - 94)  ABP: 61/44 (18 Oct 2021 21:00) (42/23 - 61/44)  ABP(mean): 52 (18 Oct 2021 21:00) (34 - 52)  RR: 31 (18 Oct 2021 21:00) (25 - 45)  SpO2: 100% (18 Oct 2021 21:25) (55% - 100%)    ABG - ( 18 Oct 2021 20:59 )  pH, Arterial: 7.18  pH, Blood: x     /  pCO2: 55    /  pO2: 51    / HCO3: 20    / Base Excess: -7.9  /  SaO2: 80.3      I&O's Detail    17 Oct 2021 07:01  -  18 Oct 2021 07:00  --------------------------------------------------------  IN:    dextrose 5% + sodium chloride 0.45% w/ Additives: 840 mL    IV PiggyBack: 150 mL  Total IN: 990 mL    OUT:    Voided (mL): 250 mL  Total OUT: 250 mL    Total NET: 740 mL    18 Oct 2021 07:01  -  18 Oct 2021 22:23  --------------------------------------------------------  IN:    dextrose 5% + lactated ringers: 1000 mL    dextrose 5% + sodium chloride 0.45% w/ Additives: 210 mL    IV PiggyBack: 225 mL    IV PiggyBack: 250 mL    IV PiggyBack: 250 mL    Lactated Ringers Bolus: 1000 mL    Norepinephrine: 130 mL    Propofol: 82.5 mL  Total IN: 3147.5 mL    OUT:    Indwelling Catheter - Urethral (mL): 220 mL  Total OUT: 220 mL    Total NET: 2927.5 mL    LABS:                        10.9   42.00 )-----------( 483      ( 18 Oct 2021 18:15 )             37.5     10-18    144  |  114<H>  |  12  ----------------------------<  216<H>  3.8   |  23  |  0.58    Ca    7.4<L>      18 Oct 2021 18:16  Phos  2.6     10-18  Mg     1.7     10-18    TPro  4.5<L>  /  Alb  1.5<L>  /  TBili  0.6  /  DBili  x   /  AST  198<H>  /  ALT  72  /  AlkPhos  183<H>  10-18    CAPILLARY BLOOD GLUCOSE    POCT Blood Glucose.: 170 mg/dL (17 Oct 2021 19:11)    PT/INR - ( 18 Oct 2021 18:16 )   PT: 15.4 sec;   INR: 1.33 ratio    PTT - ( 18 Oct 2021 18:16 )  PTT:26.5 sec    CULTURES:  Culture Results:   No growth to date. (10-16 @ 21:07)  Culture Results:   No growth to date. (10-16 @ 21:07)  Rapid RVP Result: NotDetec (10-16 @ 16:14)    Physical Examination:    General: Well appearing, lying in bed in NAD.      HEENT: Pupils equal, reactive to light. Symmetric. No scleral icterus or injection.    PULM: Clear to auscultation B/L. No wheezes, rales, or rhonchi apprecaited. No significant sputum production or increased respiratory effort.    NECK: Supple, no lymphadenopathy, trachea midline.    CVS: Regular rate and rhythm, no murmurs appreciated, +s1/s2.    ABD: Soft, nondistended, nontender, normoactive bowel sounds.    EXT: No edema, nontender.    SKIN: Warm and well perfused, no rashes noted.    NEURO: Alert, oriented, interactive, nonfocal.    RADIOLOGY:  < from: Xray Chest 1 View-PORTABLE IMMEDIATE (Xray Chest 1 View-PORTABLE IMMEDIATE .) (10.18.21 @ 12:13) >  EXAM:  XR CHEST PORTABLE IMMED 1V                          PROCEDURE DATE:  10/18/2021      INTERPRETATION:  Chest portable.    Clinical History: Line placement.    Comparison: 10/17/2021.    Single AP view submitted.  Multiple leads overlie the chest degrading image quality limiting evaluation.    Endotracheal tube, tip above the level honorio.  Nasogastric tube, distal tip not visualized but below the hemidiaphragm.    The evaluation of the cardiomediastinal silhouette is limited on portable technique.    Again noted are diffuse coarse reticulonodular interstitial infiltrates.  Mild biapical pleural thickening.  No pneumothorax noted.    Surgical clips right axilla.  Partially imaged stent overlying the mid abdomen.    Impression:    Findings as discussed above.    --- End of Report ---    JAY CLAY MD; Attending Radiologist  This document has been electronically signed. Oct 18 2021  1:20PM    < end of copied text >    CRITICAL CARE TIME SPENT: 50 minutes Patient is a 74 y/o female who presents with a chief complaint of weakness, hypoxia (18 Oct 2021 14:29)    BRIEF HOSPITAL COURSE: 74 y/o F w/ PMHx of HTN, HLD, carotid stenosis s/p stent, PVD s/p R AKA and right axillary-femoral bypass, former smoker, COPD, depression, hypothyroidism, and prior MRSA infection on chronic minocycline therapy who presented on 10/16 w/ lethargy, decreased PO intake, vomiting, and shortness of breath. CT chest to have diffuse GGO's w/ underlying emphysema (possible cardiogenic edema), a loculated left pleural effusion, and LLL rounded opacity worrisome for malignancy given smoking history. Pt admitted to medicine w/ acute hypoxic/hypercapnic respiratory failure. Upgraded to ICU level of care on 10/17 in the setting of worsening hypoxemia requiring initiation of BiPAP, and hypotension that resolved w/ IVF.    Events last 24 hours: Ultimately failed NIPPV, and was intubated this morning in the setting of worsening hypoxemia and hypotension. Bronchoscopy performed s/p intubation. This evening, pt w/ worsening hypotension requiring addition of vasopressin.     PAST MEDICAL & SURGICAL HISTORY:  Hypertension  Peripheral vascular disease  HLD (hyperlipidemia)  Hypothyroidism  Esophageal reflux  History of depression  History of methicillin resistant staphylococcus aureus (MRSA)  History of tuberculosis  Peripheral vascular disease  S/P AKA (above knee amputation), right  H/O shoulder surgery  History of total hip replacement  H/O: hysterectomy    Review of Systems:  Unable to obtain. Intubated/sedated.    Medications:  azithromycin  IVPB 500 milliGRAM(s) IV Intermittent every 24 hours  piperacillin/tazobactam IVPB.. 3.375 Gram(s) IV Intermittent every 8 hours  furosemide   Injectable 60 milliGRAM(s) IV Push once  norepinephrine Infusion 0.15 MICROgram(s)/kG/Min IV Continuous <Continuous>  albuterol/ipratropium for Nebulization 3 milliLiter(s) Nebulizer every 6 hours  montelukast 10 milliGRAM(s) Oral daily PRN  acetaminophen   Tablet .. 650 milliGRAM(s) Oral every 6 hours PRN  cisatracurium Infusion 3 MICROgram(s)/kG/Min IV Continuous <Continuous>  fentaNYL    Injectable 12.5 MICROGram(s) IV Push once PRN  fentaNYL   Infusion... 0.5 MICROgram(s)/kG/Hr IV Continuous <Continuous>  gabapentin 300 milliGRAM(s) Oral once PRN  melatonin 3 milliGRAM(s) Oral at bedtime PRN  ondansetron Injectable 4 milliGRAM(s) IV Push every 8 hours PRN  propofol Infusion 10 MICROgram(s)/kG/Min IV Continuous <Continuous>  sertraline 100 milliGRAM(s) Oral daily  aspirin enteric coated 81 milliGRAM(s) Oral daily  heparin   Injectable 5000 Unit(s) SubCutaneous every 12 hours  aluminum hydroxide/magnesium hydroxide/simethicone Suspension 30 milliLiter(s) Oral every 4 hours PRN  pantoprazole    Tablet 40 milliGRAM(s) Oral before breakfast  allopurinol 100 milliGRAM(s) Oral daily  atorvastatin 10 milliGRAM(s) Oral at bedtime  levothyroxine 100 MICROGram(s) Oral daily  methylPREDNISolone sodium succinate Injectable 40 milliGRAM(s) IV Push every 12 hours  vasopressin Infusion 0.04 Unit(s)/Min IV Continuous <Continuous>  cholecalciferol 1000 Unit(s) Oral daily  dextrose 5% + lactated ringers. 1000 milliLiter(s) IV Continuous <Continuous>  influenza   Vaccine 0.5 milliLiter(s) IntraMuscular once  benzocaine 15 mG/menthol 3.6 mG (Sugar-Free) Lozenge 1 Lozenge Oral two times a day PRN  chlorhexidine 0.12% Liquid 15 milliLiter(s) Oral Mucosa every 12 hours  chlorhexidine 4% Liquid 1 Application(s) Topical <User Schedule>  lactobacillus acidophilus 1 Tablet(s) Oral daily    Mode: AC/ CMV (Assist Control/ Continuous Mandatory Ventilation)  RR (machine): 34  TV (machine): 400  FiO2: 100  PEEP: 8  ITime: 1  MAP: 21  PIP: 32    ICU Vital Signs Last 24 Hrs  T(C): 38.7 (18 Oct 2021 20:00), Max: 38.7 (18 Oct 2021 20:00)  T(F): 101.7 (18 Oct 2021 20:00), Max: 101.7 (18 Oct 2021 20:00)  HR: 143 (18 Oct 2021 21:25) (106 - 145)  BP: 88/58 (18 Oct 2021 21:00) (69/50 - 129/67)  BP(mean): 69 (18 Oct 2021 21:00) (50 - 94)  ABP: 61/44 (18 Oct 2021 21:00) (42/23 - 61/44)  ABP(mean): 52 (18 Oct 2021 21:00) (34 - 52)  RR: 31 (18 Oct 2021 21:00) (25 - 45)  SpO2: 100% (18 Oct 2021 21:25) (55% - 100%)    ABG - ( 18 Oct 2021 20:59 )  pH, Arterial: 7.18  pH, Blood: x     /  pCO2: 55    /  pO2: 51    / HCO3: 20    / Base Excess: -7.9  /  SaO2: 80.3      I&O's Detail    17 Oct 2021 07:01  -  18 Oct 2021 07:00  --------------------------------------------------------  IN:    dextrose 5% + sodium chloride 0.45% w/ Additives: 840 mL    IV PiggyBack: 150 mL  Total IN: 990 mL    OUT:    Voided (mL): 250 mL  Total OUT: 250 mL    Total NET: 740 mL    18 Oct 2021 07:01  -  18 Oct 2021 22:23  --------------------------------------------------------  IN:    dextrose 5% + lactated ringers: 1000 mL    dextrose 5% + sodium chloride 0.45% w/ Additives: 210 mL    IV PiggyBack: 225 mL    IV PiggyBack: 250 mL    IV PiggyBack: 250 mL    Lactated Ringers Bolus: 1000 mL    Norepinephrine: 130 mL    Propofol: 82.5 mL  Total IN: 3147.5 mL    OUT:    Indwelling Catheter - Urethral (mL): 220 mL  Total OUT: 220 mL    Total NET: 2927.5 mL    LABS:                        10.9   42.00 )-----------( 483      ( 18 Oct 2021 18:15 )             37.5     10-18    144  |  114<H>  |  12  ----------------------------<  216<H>  3.8   |  23  |  0.58    Ca    7.4<L>      18 Oct 2021 18:16  Phos  2.6     10-18  Mg     1.7     10-18    TPro  4.5<L>  /  Alb  1.5<L>  /  TBili  0.6  /  DBili  x   /  AST  198<H>  /  ALT  72  /  AlkPhos  183<H>  10-18    CAPILLARY BLOOD GLUCOSE    POCT Blood Glucose.: 170 mg/dL (17 Oct 2021 19:11)    PT/INR - ( 18 Oct 2021 18:16 )   PT: 15.4 sec;   INR: 1.33 ratio    PTT - ( 18 Oct 2021 18:16 )  PTT:26.5 sec    CULTURES:  Culture Results:   No growth to date. (10-16 @ 21:07)  Culture Results:   No growth to date. (10-16 @ 21:07)  Rapid RVP Result: NotDetec (10-16 @ 16:14)    Physical Examination:    General: Well appearing, lying in bed in NAD.      HEENT: Pupils equal, reactive to light. Symmetric. No scleral icterus or injection.    PULM: Clear to auscultation B/L. No wheezes, rales, or rhonchi apprecaited. No significant sputum production or increased respiratory effort.    NECK: Supple, no lymphadenopathy, trachea midline.    CVS: Regular rate and rhythm, no murmurs appreciated, +s1/s2.    ABD: Soft, nondistended, nontender, normoactive bowel sounds.    EXT: No edema, nontender.    SKIN: Warm and well perfused, no rashes noted.    NEURO: Alert, oriented, interactive, nonfocal.    RADIOLOGY:  < from: Xray Chest 1 View-PORTABLE IMMEDIATE (Xray Chest 1 View-PORTABLE IMMEDIATE .) (10.18.21 @ 12:13) >  EXAM:  XR CHEST PORTABLE IMMED 1V                          PROCEDURE DATE:  10/18/2021      INTERPRETATION:  Chest portable.    Clinical History: Line placement.    Comparison: 10/17/2021.    Single AP view submitted.  Multiple leads overlie the chest degrading image quality limiting evaluation.    Endotracheal tube, tip above the level honorio.  Nasogastric tube, distal tip not visualized but below the hemidiaphragm.    The evaluation of the cardiomediastinal silhouette is limited on portable technique.    Again noted are diffuse coarse reticulonodular interstitial infiltrates.  Mild biapical pleural thickening.  No pneumothorax noted.    Surgical clips right axilla.  Partially imaged stent overlying the mid abdomen.    Impression:    Findings as discussed above.    --- End of Report ---    JAY CLAY MD; Attending Radiologist  This document has been electronically signed. Oct 18 2021  1:20PM    < end of copied text >    CRITICAL CARE TIME SPENT: 50 minutes Patient is a 76 y/o female who presents with a chief complaint of weakness, hypoxia (18 Oct 2021 14:29)    BRIEF HOSPITAL COURSE: 76 y/o F w/ PMHx of HTN, HLD, carotid stenosis s/p stent, PVD s/p R AKA and right axillary-femoral bypass, former smoker, COPD, depression, hypothyroidism, and prior MRSA infection on chronic minocycline therapy who presented on 10/16 w/ lethargy, decreased PO intake, vomiting, and shortness of breath. CT chest to have diffuse GGO's w/ underlying emphysema (possible cardiogenic edema), a loculated left pleural effusion, and LLL rounded opacity worrisome for malignancy given smoking history. Pt admitted to medicine w/ acute hypoxic/hypercapnic respiratory failure. Upgraded to ICU level of care on 10/17 in the setting of worsening hypoxemia requiring initiation of BiPAP, and hypotension that resolved w/ IVF.    Events last 24 hours: Ultimately failed NIPPV, and was intubated this morning in the setting of worsening hypoxemia and hypotension. Bronchoscopy performed s/p intubation. This evening, pt w/ worsening hypotension requiring addition of vasopressin.     PAST MEDICAL & SURGICAL HISTORY:  Hypertension  Peripheral vascular disease  HLD (hyperlipidemia)  Hypothyroidism  Esophageal reflux  History of depression  History of methicillin resistant staphylococcus aureus (MRSA)  History of tuberculosis  Peripheral vascular disease  S/P AKA (above knee amputation), right  H/O shoulder surgery  History of total hip replacement  H/O: hysterectomy    Review of Systems:  Unable to obtain. Intubated/sedated.    Medications:  azithromycin  IVPB 500 milliGRAM(s) IV Intermittent every 24 hours  piperacillin/tazobactam IVPB.. 3.375 Gram(s) IV Intermittent every 8 hours  furosemide   Injectable 60 milliGRAM(s) IV Push once  norepinephrine Infusion 0.15 MICROgram(s)/kG/Min IV Continuous <Continuous>  albuterol/ipratropium for Nebulization 3 milliLiter(s) Nebulizer every 6 hours  montelukast 10 milliGRAM(s) Oral daily PRN  acetaminophen   Tablet .. 650 milliGRAM(s) Oral every 6 hours PRN  cisatracurium Infusion 3 MICROgram(s)/kG/Min IV Continuous <Continuous>  fentaNYL    Injectable 12.5 MICROGram(s) IV Push once PRN  fentaNYL   Infusion... 0.5 MICROgram(s)/kG/Hr IV Continuous <Continuous>  gabapentin 300 milliGRAM(s) Oral once PRN  melatonin 3 milliGRAM(s) Oral at bedtime PRN  ondansetron Injectable 4 milliGRAM(s) IV Push every 8 hours PRN  propofol Infusion 10 MICROgram(s)/kG/Min IV Continuous <Continuous>  sertraline 100 milliGRAM(s) Oral daily  aspirin enteric coated 81 milliGRAM(s) Oral daily  heparin   Injectable 5000 Unit(s) SubCutaneous every 12 hours  aluminum hydroxide/magnesium hydroxide/simethicone Suspension 30 milliLiter(s) Oral every 4 hours PRN  pantoprazole    Tablet 40 milliGRAM(s) Oral before breakfast  allopurinol 100 milliGRAM(s) Oral daily  atorvastatin 10 milliGRAM(s) Oral at bedtime  levothyroxine 100 MICROGram(s) Oral daily  methylPREDNISolone sodium succinate Injectable 40 milliGRAM(s) IV Push every 12 hours  vasopressin Infusion 0.04 Unit(s)/Min IV Continuous <Continuous>  cholecalciferol 1000 Unit(s) Oral daily  dextrose 5% + lactated ringers. 1000 milliLiter(s) IV Continuous <Continuous>  influenza   Vaccine 0.5 milliLiter(s) IntraMuscular once  benzocaine 15 mG/menthol 3.6 mG (Sugar-Free) Lozenge 1 Lozenge Oral two times a day PRN  chlorhexidine 0.12% Liquid 15 milliLiter(s) Oral Mucosa every 12 hours  chlorhexidine 4% Liquid 1 Application(s) Topical <User Schedule>  lactobacillus acidophilus 1 Tablet(s) Oral daily    Mode: AC/ CMV (Assist Control/ Continuous Mandatory Ventilation)  RR (machine): 34  TV (machine): 400  FiO2: 100  PEEP: 8  ITime: 1  MAP: 21  PIP: 32    ICU Vital Signs Last 24 Hrs  T(C): 38.7 (18 Oct 2021 20:00), Max: 38.7 (18 Oct 2021 20:00)  T(F): 101.7 (18 Oct 2021 20:00), Max: 101.7 (18 Oct 2021 20:00)  HR: 143 (18 Oct 2021 21:25) (106 - 145)  BP: 88/58 (18 Oct 2021 21:00) (69/50 - 129/67)  BP(mean): 69 (18 Oct 2021 21:00) (50 - 94)  ABP: 61/44 (18 Oct 2021 21:00) (42/23 - 61/44)  ABP(mean): 52 (18 Oct 2021 21:00) (34 - 52)  RR: 31 (18 Oct 2021 21:00) (25 - 45)  SpO2: 100% (18 Oct 2021 21:25) (55% - 100%)    ABG - ( 18 Oct 2021 20:59 )  pH, Arterial: 7.18  pH, Blood: x     /  pCO2: 55    /  pO2: 51    / HCO3: 20    / Base Excess: -7.9  /  SaO2: 80.3      I&O's Detail    17 Oct 2021 07:01  -  18 Oct 2021 07:00  --------------------------------------------------------  IN:    dextrose 5% + sodium chloride 0.45% w/ Additives: 840 mL    IV PiggyBack: 150 mL  Total IN: 990 mL    OUT:    Voided (mL): 250 mL  Total OUT: 250 mL    Total NET: 740 mL    18 Oct 2021 07:01  -  18 Oct 2021 22:23  --------------------------------------------------------  IN:    dextrose 5% + lactated ringers: 1000 mL    dextrose 5% + sodium chloride 0.45% w/ Additives: 210 mL    IV PiggyBack: 225 mL    IV PiggyBack: 250 mL    IV PiggyBack: 250 mL    Lactated Ringers Bolus: 1000 mL    Norepinephrine: 130 mL    Propofol: 82.5 mL  Total IN: 3147.5 mL    OUT:    Indwelling Catheter - Urethral (mL): 220 mL  Total OUT: 220 mL    Total NET: 2927.5 mL    LABS:                        10.9   42.00 )-----------( 483      ( 18 Oct 2021 18:15 )             37.5     10-18    144  |  114<H>  |  12  ----------------------------<  216<H>  3.8   |  23  |  0.58    Ca    7.4<L>      18 Oct 2021 18:16  Phos  2.6     10-18  Mg     1.7     10-18    TPro  4.5<L>  /  Alb  1.5<L>  /  TBili  0.6  /  DBili  x   /  AST  198<H>  /  ALT  72  /  AlkPhos  183<H>  10-18    CAPILLARY BLOOD GLUCOSE    POCT Blood Glucose.: 170 mg/dL (17 Oct 2021 19:11)    PT/INR - ( 18 Oct 2021 18:16 )   PT: 15.4 sec;   INR: 1.33 ratio    PTT - ( 18 Oct 2021 18:16 )  PTT:26.5 sec    CULTURES:  Culture Results:   No growth to date. (10-16 @ 21:07)  Culture Results:   No growth to date. (10-16 @ 21:07)  Rapid RVP Result: NotDetec (10-16 @ 16:14)    RADIOLOGY:  < from: Xray Chest 1 View-PORTABLE IMMEDIATE (Xray Chest 1 View-PORTABLE IMMEDIATE .) (10.18.21 @ 12:13) >  EXAM:  XR CHEST PORTABLE IMMED 1V                          PROCEDURE DATE:  10/18/2021      INTERPRETATION:  Chest portable.    Clinical History: Line placement.    Comparison: 10/17/2021.    Single AP view submitted.  Multiple leads overlie the chest degrading image quality limiting evaluation.    Endotracheal tube, tip above the level honorio.  Nasogastric tube, distal tip not visualized but below the hemidiaphragm.    The evaluation of the cardiomediastinal silhouette is limited on portable technique.    Again noted are diffuse coarse reticulonodular interstitial infiltrates.  Mild biapical pleural thickening.  No pneumothorax noted.    Surgical clips right axilla.  Partially imaged stent overlying the mid abdomen.    Impression:    Findings as discussed above.    --- End of Report ---    JAY CLAY MD; Attending Radiologist  This document has been electronically signed. Oct 18 2021  1:20PM    < end of copied text >    CRITICAL CARE TIME SPENT: 50 minutes

## 2021-10-18 NOTE — PROGRESS NOTE ADULT - PROBLEM SELECTOR PLAN 4
#Hypokalemia   - Mild, asymptomatic.   - KCl riders  - continue IVF D5 + NS +KCl 20 mEq at 70 cc/hr   - Monitor with daily labs     #Hypernatremia  -Mild, asymptomatic.   -Likely 2/2 to dehydration from decreased PO intake   - continue IVF D5 + NS +KCl 20 mEq at 70 cc/hr  - Monitor with daily labs #Hypokalemia   - Resolved s/p KCl riders   - Monitor with daily labs     #Hypernatremia  -Mild, asymptomatic.   -Likely 2/2 to dehydration from decreased PO intake   - continue IVF D5NS @ 100 cc/hr   - Monitor with daily labs Hx of HTN, on antihypertensives at home   - BP hypotensive on arrival 88/57, and remains in hypotensive range likely 2/2 sepsis   - now on pressor support  - continue IVF D5LR @ 100 cc/hr   - hold home Toprol 25mg qd  - hold home nifedipine 30mg qd (takes for vasodilator effects)  - monitor hemodynamics

## 2021-10-18 NOTE — PROGRESS NOTE ADULT - PROBLEM SELECTOR PLAN 1
Patient presented with worsening SOB, PRO. SpO2 88% with EMS, now worsening hypoxemia   - Concern for possible malignancy with superimposed pneumonia given CT findings.  - CXR: Small to moderate left pleural effusion with patchy opacities within both lungs compatible with airspace disease that is likely pulmonary edema. This is worse on the left. Degenerative changes are noted of the bones.  - CT Chest: Groundglass opacities in the right lung. There is left lower lobe opacity measuring 3.8 x 2.8 cm. Emphysema. There is also a 2.3 x 3.3 cm left lower lobe medial opacity abutting the pericardium. Moderate loculated left pleural effusion. Nodular left-sided pleural thickening is noted. Trace right pleural effusion.  - Pt also with history of ?TB --- discussed with pt's daughter who stated that pt had work-up for imaging finding and they were told that pt likely had small TB scar from childhood and that she had no evidence of TB infection here. Pt has been in the [place] for [place] and not traveled out.  - Afebrile, but with worsening leukocytosis with left shift, high pro-calcitonin. Lactate elevated on admission - normalized s/p IVF.   - ABG during rapid response with mixed respiratory and metabolic acidosis. Repea  - CRP 29, CEA 35.7, B12 and folate wnl   - ordered US guided thoracentesis possibly for tomorrow with IR to evaluate pleural analysis of effusion -- concern pt may have parapneumonic effusion or empyema ; can also be malignant effusion (or both)  - pt admits that she coughs when drinking thin liquids -suspect pt has chronic aspiration  - c/w nebs q6h   - pt given levaquin yesterday -- broaden Abx to zosyn and azithromycin today   - check MRSA nares PCR, legionella urine Ag  - pt chronically on minocycline which is continued and should have some MRSA coverage, but if MRSA nares is positive, will broaden to vancomycin  - Continue on venti-mask, wean as tolerated  - Pulm (Dr. Porter/Blake) consulted, recs appreciated   - ID consult (Dr. Ruth), recs appreciated Patient presented with worsening SOB, PRO. SpO2 88% with EMS, now worsening hypoxemia and hypercarbia. Now on mechanical ventilation in ICU.   - Concern for possible malignancy with superimposed pneumonia given CT findings.  - Pt admits that she coughs when drinking thin liquids -suspect pt has chronic aspiration  - Afebrile, but with worsening leukocytosis with left shift, high pro-calcitonin and lactate.   - CXR: worsening L pleural effusion and bilateral patchy opacities.   - CT Chest: Groundglass opacities in the right lung. There is left lower lobe opacity measuring 3.8 x 2.8 cm. Emphysema. There is also a 2.3 x 3.3 cm left lower lobe medial opacity abutting the pericardium. Moderate loculated left pleural effusion. Nodular left-sided pleural thickening is noted. Trace right pleural effusion.  - Pt also with history of ?TB --- discussed with pt's daughter who stated that pt had work-up for imaging finding and they were told that pt likely had small TB scar from childhood and that she had no evidence of TB infection here. [place] the [place] and not traveled out.  - ABG during rapid response with mixed respiratory and metabolic acidosis. Repeat ABG with improvement 7.31/46/74/23   - CRP 29, CEA 35.7, B12 and folate wnl   - US guided thoracentesis to evaluate pleural analysis of effusion to be done once patient clinically stable -- concern pt may have parapneumonic effusion or empyema ; can also be malignant effusion (or both)  - c/w nebs q6h   - s/p levaquin x1 10/16/21 and broadened to zosyn and azithromycin yesterday. Will continue.   - empiric vancomycin x1 given due to worsening leukocytosis and concern for possible MRSA. Pt on minocycline at home, discontinued per ID.   - f/u MRSA nares PCR, legionella urine Ag  - f/u blood and urine cultures   - Pulm (Dr. Porter/Blake) consulted, recs appreciated   - ID consult (Dr. Ruth), recs appreciated Patient presented with worsening SOB, PRO. SpO2 88% with EMS, now worsening acidosis, hypoxemia and hypercarbia. Now on mechanical ventilation in ICU.   - Concern for possible malignancy with superimposed pneumonia given CT findings.  - Pt admits that she coughs when drinking thin liquids -suspect pt has chronic aspiration  - Afebrile, but with worsening leukocytosis with left shift, high pro-calcitonin and lactate.   - CXR: worsening L pleural effusion and bilateral patchy opacities.   - CT Chest: Groundglass opacities in the right lung. There is left lower lobe opacity measuring 3.8 x 2.8 cm. Emphysema. There is also a 2.3 x 3.3 cm left lower lobe medial opacity abutting the pericardium. Moderate loculated left pleural effusion. Nodular left-sided pleural thickening is noted. Trace right pleural effusion.  - Pt also with history of ?TB --- discussed with pt's daughter who stated that pt had work-up for imaging finding and they were told that pt likely had small TB scar from childhood and that she had no evidence of TB infection here. [place] and not traveled out.  - ABG during rapid response with mixed respiratory and metabolic acidosis. Repeat ABG 7.13/60/34/20, worsening resulting in intubation.   - CRP 29, CEA 35.7, B12 and folate wnl   - US guided thoracentesis to evaluate pleural analysis of effusion to be done once patient clinically stable -- concern pt may have parapneumonic effusion or empyema ; can also be malignant effusion (or both)  - c/w nebs q6h   - s/p levaquin x1 10/16/21 and broadened to zosyn and azithromycin yesterday. Will continue.   - empiric vancomycin x1 given due to worsening leukocytosis and concern for possible MRSA. Pt on minocycline at home, discontinued per ID.   - f/u MRSA nares PCR, legionella urine Ag  - f/u blood and urine cultures   - Pulm (Dr. Porter/Blake) consulted, recs appreciated   - ID consult (Dr. Ruth), recs appreciated Likely 2/2 pneumonia. Now on mechanical vent with pressure support.   -Continue Zosyn.   - s/p levaquin x1 10/16/21 and broadened to zosyn and azithromycin yesterday.   - continue zosyn, azithro discontinued   - empiric vancomycin x1 given due to worsening leukocytosis and concern for possible MRSA. Pt on minocycline at home, discontinued per ID.  - f/u MRSA nares PCR, legionella urine Ag  - f/u blood and urine cultures  - S/p bronch today, f/up cultures, cell count, and cytopath - septic shock likely 2/2 aspiration pneumonia. Now on mechanical vent with pressure support.   - continue zosyn, azithro discontinued as legionella Ag negative  - empiric vancomycin x1 given due to continued decompensation. Pt on minocycline at home, discontinued per ID. MRSA nares PCR negative now, so no further vanco to be given  - BCx NGTD  - S/p bronch today, f/up cultures, cell count, and cytopath  - on increasing doses of pressors in the ICU, critically ill ; family discussing GOC, will likely have pt be DNR

## 2021-10-18 NOTE — PROGRESS NOTE ADULT - ASSESSMENT
Assessment: 75 year old female with acute hypoxic respiratory failure secondary to PNA with high suspicion for possible malignancy given findings on CT scan.    Plan:   Neuro:   - Metabolic encephalopathy 2/2 hypoxia, sepsis shock  - propofol for sedation     CV:  - septic shock 2/2 PNA  - started on levophed today; titrate for goal MAP of 65-70  - c/w ASA    Resp:  - Acute hypoxic respiratory failure 2/2 PNA, ?maligancy, left pleural effusion  - Pt intubated today; ABG today indicative of respiratory acidosis  - Bronch and will consider thoracocentesis for left pleural effusion  - C/w Zosyn and Azithromycin; f/u legionella  - strep pneumoniae antigen negative  - f/u MRSA swab  - monitor respiratory status closely     GI  - GI prophylaxis with Protonix     Renal:  - Renal function stable   - s/p 1LLR blous today; c/w gentle IV hydration with D5 +1/2 NS as pt with mild hypernatremia   - Trend CMP    ID:   - Sesis 2/2 PNA  - afebrile; increasing WBC  - BCx from 10/16 with NGTD  - C/w Zosyn and Azithromycin; f/u legionella  - strep pneumoniae antigen negative  - f/u sputum culture  - f/u MRSA swab  - trend fever curve and CBC    Heme:  - DVT prophylaxis with subq heparin     Endo:  - c/w home levothyroxine    Skin: +leon    Dispo  - code status: FULL CODE

## 2021-10-18 NOTE — CHART NOTE - NSCHARTNOTEFT_GEN_A_CORE
: Sotero La MD    INDICATION: Hypoxic respiratory failure    PROCEDURE:  [ x] LIMITED ECHO  [ x] LIMITED CHEST  [ x] LIMITED RETROPERITONEAL  [ ] LIMITED ABDOMINAL  [ ] LIMITED DVT  [ ] NEEDLE GUIDANCE VASCULAR  [ ] NEEDLE GUIDANCE THORACENTESIS  [ ] NEEDLE GUIDANCE PARACENTESIS  [ ] NEEDLE GUIDANCE PERICARDIOCENTESIS  [ ] OTHER    FINDINGS:  Right hemithorax:  - Irregular pleura  - B lines  - Trace pleural effusion    Left hemithorax:  - Irregular pleura  - B lines  - Moderate complex pleural effusion    Cardiac:  - Grossly preserved systolic function  - LVOT VTI 19cm  - No MR  - No TR  - No AS  - No wall motion abnormalities  - Systolic effacement of the LV in parasternal short axis  - No pericardial effusion    IVC:  - 1.5cm with some respiratory variation    INTERPRETATION:  - No echocardiographic evidence of pericardial effusion/tamponade  - Appears hypovolemic in setting of systolic effacement of the LV, despite B lines given underlying lung disease  - Grossly normal LV systolic function  - Moderate, complex left pleural effusion    Sotero La MD  Critical Care Fellow  PGY-6

## 2021-10-18 NOTE — PROGRESS NOTE ADULT - ASSESSMENT
76 y/o F with PMHx of HTN, HLD, ABDOULAYE s/p stent, PVD s/p right AKA 2010, former smoker, depression, hypothyroidism, GERD, hx of MRSA infection, hx of TB chronically ill admitted with severe sepsis with  acute hypoxic respiratory failure with possible lung malignancy with post obstructive pneumonia.  CT chest with Left lower lobe mass suspicious for neoplasm and moderate loculated left pleural effusion. ?malignant effusion + lymphadenopathy.  WBC 15.66. Pt on NRB Agitated and restless.  Had RRT sec worsening respiratory status and hypotension  Seen by ICU       10/18: Doing poorly.  Leukemoid reaction potentially multifactorial in the setting of possible malignancy, and potentially superimposed infection (empyema, postobstructive pneumonia).  The role of minocycline here is unknown.  She has skin changes consistent with chronic use.  This is reversible.  I would clarify its role here, and discontinue it unless there is strong evidence to support its continued use.  In the interim, we can use vancomycin for MRSA coverage.  PCR has been ordered, no result as of yet.  Would dose vancomycin if PCR is positive.  However getting a MRSA pneumonia while on minocycline therapy (presumably suppressive) seems less likely.  This is unlikely to represent Legionella, or other "atypical" bacteria.  Minocycline has activity against Legionella and many of these "atypical" causes of pneumonia.  Once Legionella comes back and is negative, would discontinue azithromycin.  Zosyn is reasonable in the setting.  Her overall status is poor, and worsening.    Suggestions:  Await MRSA PCR  Dose vancomycin if MRSA PCR is positive  Would stop minocycline unless there is a strong indication to continue it  There is no therapy for the hyper pigmentation, it is cosmetic only, but it is permanent.  Continue Zosyn  Await Legionella antigen  If Legionella antigen is negative, stop azithromycin  Remain cognizant of potential for QTC prolongation with azithromycin and sertraline  Trend white blood cell count  Follow-up culture data  Await thoracentesis  Fluid for cell count, differential, Gram stain/culture, pH, protein, LDH, glucose, AFB stain/culture, fungal stain/culture, and cytology    Overall outlook guarded.    Scott Ellis MD  Attending Physician  [facility]  Division of Infectious Diseases  [phone]    Mass of lower lobe of left lung [R91.8]    Peripheral vascular disease [I73.9]    Acute respiratory failure with hypoxia [J96.01]    Acute pneumonia [J18.9]    Pleural effusion, left [J90]

## 2021-10-18 NOTE — DIETITIAN INITIAL EVALUATION ADULT. - PROBLEM SELECTOR PLAN 6
Acute, BP was low in the ED 88/57   - IVF LR at 75 cc/hr   - Hold BP meds - Metoprolol and Procardia  - monitor hemodynamics

## 2021-10-18 NOTE — SWALLOW BEDSIDE ASSESSMENT ADULT - COMMENTS
Consult received and chart reviewed. Attempted to see the patient this AM for an initial assessment of swallow function, however, patient currently on BiPAP and unable to be weened to nasal cannula at this time per discussion with respiratory therapist. Re-consult this department when the patient is safely tolerating nasal cannula or room air for at least 1-2 hours for the purposes of a swallow evaluation. Consider NPO and short-term non-oral means of nutrition/hydration given patient's compromised respiratory status. Call out to MD to discuss.
Consult received and chart reviewed. Attempted to see Patient at bedside this afternoon for initial assessment of swallow function. Upon SLP arrival, Patient currently on supplemental O2 via Venti Mask; discussed with RN who reported a change in respiratory status and need of venti mask at this time. Discussed with RN and medical team (resident [phone]) to consider oral nutrition/hydration/medication is contraindicated d/t current respiratory status and consider short-term means of non-oral nutrition/hydration/medication and GOC discussion with family regarding nutritional plan of care. Please reconsult this department when Patient's respiratory status improves and she is tolerating NC or room air (at least 1-2 hours s/p transition) and can safely participate in PO trials for swallow assessment.

## 2021-10-18 NOTE — CHART NOTE - NSCHARTNOTEFT_GEN_A_CORE
Pre-Bronchoscopy Tuberculosis Risk Screening Tool  To reduce the risk for airborne transmission of Mycobacterium tuberculosis, this assessment form must be completed prior to bronchoscopy procedures being performed outside of a negative pressure environment.    Procedure Date & Time: 10/18/21 1600  Health Care Provider Name: Dr. Sánchez  Reason for the Bronchoscopy: Hypoxic respiratory failure    Planned Location for the Procedure:  [ ] Emergency Department     [ x] Intensive Care Unit     [ ] Operating Room     Other: ___________________    Risk Assessment  I. I have personally evaluated this patient for Mycobacterium tuberculosis and I determined the following risk:  [x ] Low risk for TB     [ ] Significant risk for TB    II. Additional Findings: _________________________    III. Based on the Determined Risk for TB the following Action(s) are Suggested:  If there is a significant risk for tuberculosis infection, the following recommendations should be followed:            a. Perform the procedure in a negative pressure room, with N95 respirator.            b. If not feasible to move the patient or defer the procedure:                    i. Use a single-bedded room low traffic area to perform the bronchoscopy procedure.                   ii. Place a portable high-efficiency particulate air (HEPA) filter in the space prior to starting the procedure and keep closed.                       Refer to the INF.1132 titled “Tuberculosis Control Strategy Plan” for additional information.                  iii. All Health Care Providers within the procedure room shall wear an N95 respirator.            c. Documentation of the tuberculosis risk assessment should be included within the patient’s medical record.      Bronchoscopy Procedure Note:  Indication: hypoxic respiratory failure    Operators: Guillaume La and Gonzalo    Pre-op Dx: Hypoxic respiratory failure    History: 75F admitted with possible malignant pleural effusion and LLL mass    Preop medications: propofol gtt, lidocaine topically    Xray/CT Findings: LLL ?mass with mod/large pleural effusion with diffuse parenchymal findings c/w emphasema    Findings:  Bronchoscope inserted through ETT. ETT noted to be in good position. Airway evaluation revealed diffuse anthracotic pigments throughout the bronchial tree. Minimal thick secretions, right worse than left. No evidence of obstruction. Mucosa not friable. BAL obtained from LLL in posterior and lateral segments. Bronchial washings obtained from RML and RLL.  Bronchoscope then withdrawn from ETT.    Sotero La MD  Critical Care Fellow  PGY-6    Specimens: BAL and bronchial washings sent for cytopathology, cell count, culture Pre-Bronchoscopy Tuberculosis Risk Screening Tool  To reduce the risk for airborne transmission of Mycobacterium tuberculosis, this assessment form must be completed prior to bronchoscopy procedures being performed outside of a negative pressure environment.    Procedure Date & Time: 10/18/21 1600  Health Care Provider Name: Dr. Sánchez  Reason for the Bronchoscopy: Hypoxic respiratory failure    Planned Location for the Procedure:  [ ] Emergency Department     [ x] Intensive Care Unit     [ ] Operating Room     Other: ___________________    Risk Assessment  I. I have personally evaluated this patient for Mycobacterium tuberculosis and I determined the following risk:  [x ] Low risk for TB     [ ] Significant risk for TB    II. Additional Findings: _________________________    III. Based on the Determined Risk for TB the following Action(s) are Suggested:  If there is a significant risk for tuberculosis infection, the following recommendations should be followed:            a. Perform the procedure in a negative pressure room, with N95 respirator.            b. If not feasible to move the patient or defer the procedure:                    i. Use a single-bedded room low traffic area to perform the bronchoscopy procedure.                   ii. Place a portable high-efficiency particulate air (HEPA) filter in the space prior to starting the procedure and keep closed.                       Refer to the INF.1132 titled “Tuberculosis Control Strategy Plan” for additional information.                  iii. All Health Care Providers within the procedure room shall wear an N95 respirator.            c. Documentation of the tuberculosis risk assessment should be included within the patient’s medical record.      Bronchoscopy Procedure Note:  Indication: hypoxic respiratory failure    Operators: Guillaume La and Gonzalo    Pre-op Dx: Hypoxic respiratory failure    History: 75F admitted with possible malignant pleural effusion and LLL mass    Preop medications: propofol gtt, lidocaine topically    Xray/CT Findings: LLL ?mass with mod/large pleural effusion with diffuse parenchymal findings c/w emphasema    Findings:  Bronchoscope inserted through ETT. ETT noted to be in good position. Airway evaluation revealed diffuse anthracotic pigments throughout the bronchial tree. Minimal thick secretions, right worse than left. No evidence of obstruction. Mucosa not friable. BAL obtained from LLL in posterior and lateral segments. Bronchial washings obtained from RML and RLL.  Bronchoscope then withdrawn from ETT.    Sotero La MD  Critical Care Fellow  PGY-6    Specimens: BAL and bronchial washings sent for cytopathology, cell count, culture        ATTENDING ATTESTATION:    I was present during the key portions of the procedure and immediately available during the entire procedure.

## 2021-10-18 NOTE — PROGRESS NOTE ADULT - SUBJECTIVE AND OBJECTIVE BOX
[facility]  Division of Infectious Diseases  [phone]    Name: MARIANN PARISI  Age: 75y  Gender: Female  MRN: [id number]    Interval History--  Notes reviewed.     Past Medical History--  No pertinent past medical history    Hypertension    Peripheral vascular disease    HLD (hyperlipidemia)    Hypothyroidism    Esophageal reflux    History of depression    History of methicillin resistant staphylococcus aureus (MRSA)    History of tuberculosis    Peripheral vascular disease    S/P AKA (above knee amputation), right    H/O shoulder surgery    History of total hip replacement    H/O: hysterectomy        For details regarding the patient's social history, family history, and other miscellaneous elements, please refer the initial infectious diseases consultation and/or the admitting history and physical examination for this admission.    Allergies    No Known Allergies    Intolerances        Medications--  Antibiotics:  azithromycin  IVPB 500 milliGRAM(s) IV Intermittent every 24 hours  minocycline 100 milliGRAM(s) Oral two times a day  piperacillin/tazobactam IVPB.. 3.375 Gram(s) IV Intermittent every 8 hours    Immunologic:  influenza   Vaccine 0.5 milliLiter(s) IntraMuscular once    Other:  acetaminophen   Tablet .. PRN  albuterol/ipratropium for Nebulization  allopurinol  aluminum hydroxide/magnesium hydroxide/simethicone Suspension PRN  aspirin enteric coated  atorvastatin  benzocaine 15 mG/menthol 3.6 mG (Sugar-Free) Lozenge PRN  cholecalciferol  dextrose 5% + sodium chloride 0.45% with potassium chloride 20 mEq/L  gabapentin PRN  lactated ringers Bolus  lactobacillus acidophilus  levothyroxine  melatonin PRN  montelukast PRN  ondansetron Injectable PRN  pantoprazole    Tablet  sertraline      Review of Systems--  A 10-point review of systems was obtained.     Pertinent positives and negatives--  Constitutional: No fevers. No Chills. No Rigors.   Cardiovascular: No chest pain. No palpitations.  Respiratory: No shortness of breath. No cough.  Gastrointestinal: No nausea or vomiting. No diarrhea or constipation.   Psychiatric: Pleasant. Appropriate affect.    Review of systems otherwise negative except as previously noted.    Physical Examination--  Vital Signs: T(F): 99.1 (10-18-21 @ 04:00), Max: 99.1 (10-18-21 @ 04:00)  HR: 109 (10-18-21 @ 08:12)  BP: 95/76 (10-18-21 @ 08:00)  RR: 26 (10-18-21 @ 08:00)  SpO2: 90% (10-18-21 @ 08:00)  Wt(kg): --  General: Nontoxic-appearing Female in no acute distress.  HEENT: AT/NC. PERRL. EOMI. Anicteric. Conjunctiva pink and moist. Oropharynx clear. Dentition fair.  Neck: Not rigid. No sense of mass.  Nodes: None palpable.  Lungs: Clear bilaterally without rales, wheezing or rhonchi  Heart: Regular rate and rhythm. No Murmur. No rub. No gallop. No palpable thrill.  Abdomen: Bowel sounds present and normoactive. Soft. Nondistended. Nontender. No sense of mass. No organomegaly.  Back: No spinal tenderness. No costovertebral angle tenderness.   Extremities: No cyanosis or clubbing. No edema.   Skin: Warm. Dry. Good turgor. No rash. No vasculitic stigmata.  Psychiatric: Appropriate affect and mood for situation.         Laboratory Studies--  CBC                        11.4   32.48 )-----------( 423      ( 18 Oct 2021 06:34 )             39.1       Chemistries  10-18    146<H>  |  118<H>  |  10  ----------------------------<  149<H>  4.1   |  21<L>  |  0.36<L>    Ca    7.2<L>      18 Oct 2021 06:34  Phos  2.7     10-18  Mg     1.9     10-18    TPro  4.7<L>  /  Alb  1.5<L>  /  TBili  0.4  /  DBili  x   /  AST  101<H>  /  ALT  49  /  AlkPhos  188<H>  10-18      Culture Data    Culture - Blood (collected 16 Oct 2021 21:07)  Source: .Blood Blood-Venous  Preliminary Report (17 Oct 2021 22:01):    No growth to date.    Culture - Blood (collected 16 Oct 2021 21:07)  Source: .Blood Blood-Venous  Preliminary Report (17 Oct 2021 22:01):    No growth to date.             [facility]  Division of Infectious Diseases  [phone]    Name: MARIANN PARISI  Age: 75y  Gender: Female  MRN: [id number]    Interval History--  Notes reviewed. Seen earlier today.  In ICU, on NIPPV.  She appears to have a mild tachypnea and respiratory difficulty despite this.  Case reviewed with  at the time of assessment.  Thoracocentesis is planned for today.  She remains on broad-spectrum antibiotics including azithromycin, Zosyn, and minocycline.  The patient is a nonhistorian.    Past Medical History--  No pertinent past medical history    Hypertension    Peripheral vascular disease    HLD (hyperlipidemia)    Hypothyroidism    Esophageal reflux    History of depression    History of methicillin resistant staphylococcus aureus (MRSA)    History of tuberculosis    Peripheral vascular disease    S/P AKA (above knee amputation), right    H/O shoulder surgery    History of total hip replacement    H/O: hysterectomy        For details regarding the patient's social history, family history, and other miscellaneous elements, please refer the initial infectious diseases consultation and/or the admitting history and physical examination for this admission.    Allergies    No Known Allergies    Intolerances        Medications--  Antibiotics:  azithromycin  IVPB 500 milliGRAM(s) IV Intermittent every 24 hours  minocycline 100 milliGRAM(s) Oral two times a day  piperacillin/tazobactam IVPB.. 3.375 Gram(s) IV Intermittent every 8 hours    Immunologic:  influenza   Vaccine 0.5 milliLiter(s) IntraMuscular once    Other:  acetaminophen   Tablet .. PRN  albuterol/ipratropium for Nebulization  allopurinol  aluminum hydroxide/magnesium hydroxide/simethicone Suspension PRN  aspirin enteric coated  atorvastatin  benzocaine 15 mG/menthol 3.6 mG (Sugar-Free) Lozenge PRN  cholecalciferol  dextrose 5% + sodium chloride 0.45% with potassium chloride 20 mEq/L  gabapentin PRN  lactated ringers Bolus  lactobacillus acidophilus  levothyroxine  melatonin PRN  montelukast PRN  ondansetron Injectable PRN  pantoprazole    Tablet  sertraline      Review of Systems--  Review of systems unable secondary to clinical condition    Physical Examination--  Vital Signs: T(F): 99.1 (10-18-21 @ 04:00), Max: 99.1 (10-18-21 @ 04:00)  HR: 109 (10-18-21 @ 08:12)  BP: 95/76 (10-18-21 @ 08:00)  RR: 26 (10-18-21 @ 08:00)  SpO2: 90% (10-18-21 @ 08:00)  Wt(kg): --  General: Chronically ill-appearing Female in Mild respiratory distress  HEENT: AT/NC. Pupils/EOM unable secondary to patient compliance. Anicteric. Conjunctiva pink and moist. Oropharynx/dentition unable secondary to patient compliance.   Neck: Not rigid. No sense of mass.  Nodes: None palpable.  Lungs: Coarse bilateral breath sounds  Heart: Tachycardic with regular rhythm.  1/6 systolic murmur.  No rub, gallop, or palpable thrill.  Abdomen: Bowel sounds present and normoactive. Soft. Nondistended. Nontender. No sense of mass. No organomegaly.  Back: No spinal tenderness. No costovertebral angle tenderness.   Extremities: No cyanosis or clubbing. No edema. Right above-knee amputation.  Extremities wasted.  Skin: Warm. Dry. Good turgor. Hyperpigmentation consistent with tissue deposition/chronic tetracycline use.. No vasculitic stigmata.  Psychiatric:Unable.         Laboratory Studies--  CBC                        11.4   32.48 )-----------( 423      ( 18 Oct 2021 06:34 )             39.1       Chemistries  10-18    146<H>  |  118<H>  |  10  ----------------------------<  149<H>  4.1   |  21<L>  |  0.36<L>    Ca    7.2<L>      18 Oct 2021 06:34  Phos  2.7     10-18  Mg     1.9     10-18    TPro  4.7<L>  /  Alb  1.5<L>  /  TBili  0.4  /  DBili  x   /  AST  101<H>  /  ALT  49  /  AlkPhos  188<H>  10-18      Culture Data    Culture - Blood (collected 16 Oct 2021 21:07)  Source: .Blood Blood-Venous  Preliminary Report (17 Oct 2021 22:01):    No growth to date.    Culture - Blood (collected 16 Oct 2021 21:07)  Source: .Blood Blood-Venous  Preliminary Report (17 Oct 2021 22:01):    No growth to date.

## 2021-10-18 NOTE — PROGRESS NOTE ADULT - PROBLEM SELECTOR PLAN 6
Chronic, Stable   -C/w zoloft 100mg Chronic, stable   - C/w asa + lipitor   - C/w gabapentin PRN for phantom limb pain  - Holding home nifedipine for hypotension

## 2021-10-18 NOTE — PROGRESS NOTE ADULT - SUBJECTIVE AND OBJECTIVE BOX
Patient is a 75y old  Female who presents with a chief complaint of weakness, hypoxia (18 Oct 2021 14:29)    24 hour events: Patient was seen and examined at bedside. Overnight the patient was transferred to the ICU for hypoxic respiratory failure. The patient was placed on BIPAP and did well overnight, however today pt's respiratory status deteriorated with worsenign hypoxia, AMS, and increased work of breathing while on BIPAP requiring the pt to be intubated. Initially the patient was AAOx3 this morning without any acute complaints, but became altered as the day progressed.    REVIEW OF SYSTEMS  unable to obtain 2/2 clinical condition    T(F): 96.1 (10-18-21 @ 12:00), Max: 99.1 (10-18-21 @ 04:00)  HR: 123 (10-18-21 @ 15:00) (106 - 128)  BP: 102/66 (10-18-21 @ 15:00) (69/50 - 129/67)  RR: 39 (10-18-21 @ 15:00) (25 - 45)  SpO2: 73% (10-18-21 @ 15:00) (55% - 93%)  Wt(kg): --    Mode: AC/ CMV (Assist Control/ Continuous Mandatory Ventilation), RR (machine): 20, TV (machine): 400, FiO2: 70, PEEP: 8        I&O's Summary    10-17 @ 07:01  -  10-18 @ 07:00  --------------------------------------------------------  IN: 990 mL / OUT: 250 mL / NET: 740 mL    10-18 @ 07:01  -  10-18 @ 16:04  --------------------------------------------------------  IN: 2472.5 mL / OUT: 150 mL / NET: 2322.5 mL      PHYSICAL EXAM  General: appeared uncomfortable with increased work of breathing, somnolent, altered   CNS: awake, but somnolent, altered   HEENT: NCAT, dry mucous membranes  Resp: decreased bs b/l, + rhonchi, +expiratory wheezing  CVS: tachy, +S1+S2  Abd: +BS, soft, NT, ND  Ext: AKA RLE, no cyanosis or clubbing  Skin: dark/black skin changes on b/l LE and face    MEDICATIONS  azithromycin  IVPB IV Intermittent  piperacillin/tazobactam IVPB.. IV Intermittent      allopurinol Oral  atorvastatin Oral  levothyroxine Oral    albuterol/ipratropium for Nebulization Nebulizer  montelukast Oral PRN    acetaminophen   Tablet .. Oral PRN  fentaNYL    Injectable IV Push PRN  gabapentin Oral PRN  melatonin Oral PRN  ondansetron Injectable IV Push PRN  propofol Infusion IV Continuous  sertraline Oral      aspirin enteric coated Oral  heparin   Injectable SubCutaneous    aluminum hydroxide/magnesium hydroxide/simethicone Suspension Oral PRN  pantoprazole    Tablet Oral      cholecalciferol Oral  dextrose 5% + lactated ringers. IV Continuous    influenza   Vaccine IntraMuscular    benzocaine 15 mG/menthol 3.6 mG (Sugar-Free) Lozenge Oral PRN  chlorhexidine 0.12% Liquid Oral Mucosa    lactobacillus acidophilus Oral                          11.4   32.48 )-----------( 423      ( 18 Oct 2021 06:34 )             39.1       10-18    146<H>  |  118<H>  |  10  ----------------------------<  149<H>  4.1   |  21<L>  |  0.36<L>    Ca    7.2<L>      18 Oct 2021 06:34  Phos  2.7     10-18  Mg     1.9     10-18    TPro  4.7<L>  /  Alb  1.5<L>  /  TBili  0.4  /  DBili  x   /  AST  101<H>  /  ALT  49  /  AlkPhos  188<H>  10-18    Lactate 2.6           10-18 @ 06:34    Lactate 2.3           10-17 @ 21:25              .Blood Blood-Venous   No growth to date. -- 10-16 @ 21:07      Rapid RVP Result: NotDetec (10-16 @ 16:14)    Radiology: ***  Bedside lung ultrasound: ***  Bedside ECHO: ***    CENTRAL LINE: N  CHANTEL: Y                        DATE INSERTED: 10/18/21            REMOVE: N  A-LINE: N                    GLOBAL ISSUE/BEST PRACTICE  Analgesia: yes  Sedation: yes  CAM-ICU   HOB elevation: yes  Stress ulcer prophylaxis: yes  VTE prophylaxis: yes  Glycemic control: no  Nutrition: NGT    CODE STATUS: full  GOC discussion: Y       Patient is a 75y old  Female who presents with a chief complaint of weakness, hypoxia (18 Oct 2021 14:29)    24 hour events: Patient was seen and examined at bedside. Overnight the patient was transferred to the ICU for hypoxic respiratory failure. The patient was placed on BIPAP and did well overnight, however today pt's respiratory status deteriorated with worsenign hypoxia, AMS, and increased work of breathing while on BIPAP requiring the pt to be intubated. Initially the patient was AAOx3 this morning without any acute complaints, but became altered as the day progressed.    REVIEW OF SYSTEMS  unable to obtain 2/2 clinical condition    T(F): 96.1 (10-18-21 @ 12:00), Max: 99.1 (10-18-21 @ 04:00)  HR: 123 (10-18-21 @ 15:00) (106 - 128)  BP: 102/66 (10-18-21 @ 15:00) (69/50 - 129/67)  RR: 39 (10-18-21 @ 15:00) (25 - 45)  SpO2: 73% (10-18-21 @ 15:00) (55% - 93%)  Wt(kg): --    Mode: AC/ CMV (Assist Control/ Continuous Mandatory Ventilation), RR (machine): 20, TV (machine): 400, FiO2: 70, PEEP: 8        I&O's Summary    10-17 @ 07:01  -  10-18 @ 07:00  --------------------------------------------------------  IN: 990 mL / OUT: 250 mL / NET: 740 mL    10-18 @ 07:01  -  10-18 @ 16:04  --------------------------------------------------------  IN: 2472.5 mL / OUT: 150 mL / NET: 2322.5 mL      PHYSICAL EXAM  General: appeared uncomfortable with increased work of breathing, somnolent, altered   CNS: awake, but somnolent, altered   HEENT: NCAT, dry mucous membranes  Resp: decreased bs b/l, + rhonchi, +expiratory wheezing  CVS: tachy, +S1+S2  Abd: +BS, soft, NT, ND  Ext: AKA RLE, no cyanosis or clubbing  Skin: dark/black skin changes on b/l LE and face    MEDICATIONS  azithromycin  IVPB IV Intermittent  piperacillin/tazobactam IVPB.. IV Intermittent      allopurinol Oral  atorvastatin Oral  levothyroxine Oral    albuterol/ipratropium for Nebulization Nebulizer  montelukast Oral PRN    acetaminophen   Tablet .. Oral PRN  fentaNYL    Injectable IV Push PRN  gabapentin Oral PRN  melatonin Oral PRN  ondansetron Injectable IV Push PRN  propofol Infusion IV Continuous  sertraline Oral      aspirin enteric coated Oral  heparin   Injectable SubCutaneous    aluminum hydroxide/magnesium hydroxide/simethicone Suspension Oral PRN  pantoprazole    Tablet Oral      cholecalciferol Oral  dextrose 5% + lactated ringers. IV Continuous    influenza   Vaccine IntraMuscular    benzocaine 15 mG/menthol 3.6 mG (Sugar-Free) Lozenge Oral PRN  chlorhexidine 0.12% Liquid Oral Mucosa    lactobacillus acidophilus Oral                          11.4   32.48 )-----------( 423      ( 18 Oct 2021 06:34 )             39.1       10-18    146<H>  |  118<H>  |  10  ----------------------------<  149<H>  4.1   |  21<L>  |  0.36<L>    Ca    7.2<L>      18 Oct 2021 06:34  Phos  2.7     10-18  Mg     1.9     10-18    TPro  4.7<L>  /  Alb  1.5<L>  /  TBili  0.4  /  DBili  x   /  AST  101<H>  /  ALT  49  /  AlkPhos  188<H>  10-18    Lactate 2.6           10-18 @ 06:34    Lactate 2.3           10-17 @ 21:25              .Blood Blood-Venous   No growth to date. -- 10-16 @ 21:07      Rapid RVP Result: NotDetec (10-16 @ 16:14)    Bedside lung ultrasound: diffuse B-lines b/l; trace r sided pleural effusion, left small to moderate effusion  Bedside ECHO: no pericardial effusion    CENTRAL LINE: N  GOLDEN: Y                        DATE INSERTED: 10/18/21            REMOVE: N  A-LINE: N                    GLOBAL ISSUE/BEST PRACTICE  Analgesia: yes  Sedation: yes  CAM-ICU   HOB elevation: yes  Stress ulcer prophylaxis: yes  VTE prophylaxis: yes  Glycemic control: no  Nutrition: NGT    CODE STATUS: full  GOC discussion: Y

## 2021-10-18 NOTE — DIETITIAN INITIAL EVALUATION ADULT. - PROBLEM SELECTOR PLAN 1
CT Chest: Groundglass opacities in the right lung. There is left lower lobe opacity measuring 3.8 x 2.8 cm. Emphysema. There is also a 2.3 x 3.3 cm left lower lobe medial opacity abutting the pericardium. Moderate loculated left pleural effusion. Nodular left-sided pleural thickening is noted. Trace right pleural effusion.  - With acute hypoxemic respiratory failure (88% with EMS, 91% on admission) - O2 supplementation as needed  - Has Weakness - follow up TSH, B12, folate, C-reactive protein, CEA. Get Nutrition consult.  - Given smoking hx, weight loss, weakness - suspicion that this is malignancy  - Loculated Pleural effusion (from OP records there is a history of TB) - evaluate for diagnostic thoracentesis for pleural analysis of suspected malignant effusion. Pulm Consult (Dr. Porter)  - Given Levaquin 500mg in ED (covered for 24h) - will defer further abx to ID  - lactate was elevated (normalized s/p IVF), PCT elevated  - Follow up Blood Cx, MRSA PCR, urinary ag  - ID consult (Dr. Hyde), f/u recs re: role for continued antibiotics

## 2021-10-18 NOTE — PROGRESS NOTE ADULT - PROBLEM SELECTOR PLAN 10
# DVT ppx: Lovenox 40 subq daily, can hold for procedure       #Dysphagis   - S/S consulted, will appreciate recs   - Diet: mechanical soft # DVT ppx: Lovenox 40 subq daily, can hold for procedure       #Dysphagia   - S/S consulted, recs appreciated   - Diet: mechanical soft Chronic stable   - C/w Lipitor 10mg

## 2021-10-18 NOTE — PROGRESS NOTE ADULT - SUBJECTIVE AND OBJECTIVE BOX
Patient is a 75y old  Female who presents with a chief complaint of weakness, hypoxia (18 Oct 2021 10:04)      INTERVAL HPI/OVERNIGHT EVENTS: Overnight, rapid response called for hypoxia - SpO2 90s on 50% venti and hypotension - SBP 42 --> 62. Patient initially placed on NRB and transitioned to BiPAP due to AMS and AB.22/54/65/22. 1L bolus NS given and patient transferred to ICU. Patient seen and examined at bedside in ICU. Appears in NAD on BiPAP, still with AMS, unresponsive to questioning. Patient unable to contribute to remainder of interval history and ROS due to mental status.         MEDICATIONS  (STANDING):  albuterol/ipratropium for Nebulization 3 milliLiter(s) Nebulizer every 6 hours  allopurinol 100 milliGRAM(s) Oral daily  aspirin enteric coated 81 milliGRAM(s) Oral daily  atorvastatin 10 milliGRAM(s) Oral at bedtime  azithromycin  IVPB 500 milliGRAM(s) IV Intermittent every 24 hours  chlorhexidine 0.12% Liquid 15 milliLiter(s) Oral Mucosa every 12 hours  cholecalciferol 1000 Unit(s) Oral daily  dextrose 5% + lactated ringers. 1000 milliLiter(s) (100 mL/Hr) IV Continuous <Continuous>  heparin   Injectable 5000 Unit(s) SubCutaneous every 12 hours  influenza   Vaccine 0.5 milliLiter(s) IntraMuscular once  lactobacillus acidophilus 1 Tablet(s) Oral daily  levothyroxine 100 MICROGram(s) Oral daily  pantoprazole    Tablet 40 milliGRAM(s) Oral before breakfast  piperacillin/tazobactam IVPB.. 3.375 Gram(s) IV Intermittent every 8 hours  propofol Infusion 10 MICROgram(s)/kG/Min (2.88 mL/Hr) IV Continuous <Continuous>  sertraline 100 milliGRAM(s) Oral daily    MEDICATIONS  (PRN):  acetaminophen   Tablet .. 650 milliGRAM(s) Oral every 6 hours PRN Temp greater or equal to 38C (100.4F), Mild Pain (1 - 3)  aluminum hydroxide/magnesium hydroxide/simethicone Suspension 30 milliLiter(s) Oral every 4 hours PRN Dyspepsia  benzocaine 15 mG/menthol 3.6 mG (Sugar-Free) Lozenge 1 Lozenge Oral two times a day PRN Sore Throat  fentaNYL    Injectable 12.5 MICROGram(s) IV Push once PRN Moderate Pain (4 - 6)  gabapentin 300 milliGRAM(s) Oral once PRN Phantom pain  melatonin 3 milliGRAM(s) Oral at bedtime PRN Insomnia  montelukast 10 milliGRAM(s) Oral daily PRN allergies  ondansetron Injectable 4 milliGRAM(s) IV Push every 8 hours PRN Nausea and/or Vomiting      Allergies    No Known Allergies    Intolerances        REVIEW OF SYSTEMS: unable to obtain due to mental status     Vital Signs Last 24 Hrs  T(C): 35.6 (18 Oct 2021 12:00), Max: 37.3 (18 Oct 2021 04:00)  T(F): 96.1 (18 Oct 2021 12:00), Max: 99.1 (18 Oct 2021 04:00)  HR: 128 (18 Oct 2021 12:10) (106 - 128)  BP: 74/37 (18 Oct 2021 10:30) (69/50 - 117/79)  BP(mean): 50 (18 Oct 2021 10:30) (50 - 95)  RR: 34 (18 Oct 2021 10:30) (25 - 45)  SpO2: 58% (18 Oct 2021 10:30) (55% - 93%)    PHYSICAL EXAM:  GENERAL: not in acute distress, on BiPAP   HEENT:  anicteric, moist mucous membranes  CHEST/LUNG:  CTA b/l, no rales, wheezes, or rhonchi  HEART:  tachycardic, regular rhythm, S1, S2  ABDOMEN:  BS+, soft, nontender, nondistended; +RLQ hernia  EXTREMITIES: no edema, cyanosis, or calf tenderness; Right AKA; no LLE edema and TTP noted  NERVOUS SYSTEM: lethargic, does not answers questions and follows commands appropriately      LABS:                        11.4   32.48 )-----------( 423      ( 18 Oct 2021 06:34 )             39.1     CBC Full  -  ( 18 Oct 2021 06:34 )  WBC Count : 32.48 K/uL  Hemoglobin : 11.4 g/dL  Hematocrit : 39.1 %  Platelet Count - Automated : 423 K/uL  Mean Cell Volume : 88.7 fl  Mean Cell Hemoglobin : 25.9 pg  Mean Cell Hemoglobin Concentration : 29.2 gm/dL  Auto Neutrophil # : x  Auto Lymphocyte # : x  Auto Monocyte # : x  Auto Eosinophil # : x  Auto Basophil # : x  Auto Neutrophil % : x  Auto Lymphocyte % : x  Auto Monocyte % : x  Auto Eosinophil % : x  Auto Basophil % : x    18 Oct 2021 06:34    146    |  118    |  10     ----------------------------<  149    4.1     |  21     |  0.36     Ca    7.2        18 Oct 2021 06:34  Phos  2.7       18 Oct 2021 06:34  Mg     1.9       18 Oct 2021 06:34    TPro  4.7    /  Alb  1.5    /  TBili  0.4    /  DBili  x      /  AST  101    /  ALT  49     /  AlkPhos  188    18 Oct 2021 06:34        CAPILLARY BLOOD GLUCOSE      POCT Blood Glucose.: 170 mg/dL (17 Oct 2021 19:11)        Culture - Blood (collected 10-16-21 @ 21:07)  Source: .Blood Blood-Venous  Preliminary Report (10-17-21 @ 22:01):    No growth to date.    Culture - Blood (collected 10-16-21 @ 21:07)  Source: .Blood Blood-Venous  Preliminary Report (10-17-21 @ 22:01):    No growth to date.        RADIOLOGY & ADDITIONAL TESTS:  Personally reviewed.   EXAM:  XR CHEST PORTABLE IMMED 1V                            PROCEDURE DATE:  10/18/2021          INTERPRETATION:  Chest portable.    Clinical History: Line placement.    Comparison: 10/17/2021.    Single AP view submitted.  Multiple leads overlie the chest degrading image quality limiting evaluation.    Endotracheal tube, tip above the level honorio.  Nasogastric tube, distal tip not visualized but below the hemidiaphragm.    The evaluation of the cardiomediastinal silhouette is limited on portable technique.    Again noted are diffuse coarse reticulonodular interstitial infiltrates.  Mild biapical pleural thickening.  No pneumothorax noted.    Surgical clips right axilla.  Partially imaged stent overlying the mid abdomen.    Impression:    Findings as discussed above.        --- End of Report ---            JAY CLAY MD; Attending Radiologist  This document has been electronically signed. Oct 18 2021  1:20PM      Consultant(s) Notes Reviewed:  [x] YES  [ ] NO     Patient is a 75y old  Female who presents with a chief complaint of weakness, hypoxia (18 Oct 2021 10:04)      INTERVAL HPI/OVERNIGHT EVENTS: Overnight, rapid response called for hypoxia - SpO2 90s on 50% venti and hypotension - SBP 42 --> 62. Patient initially placed on NRB and transitioned to BiPAP due to AMS and AB.22/54/65/22. 1L bolus NS given and patient transferred to ICU. Patient seen and examined at bedside in ICU. Appears in NAD on BiPAP, still with AMS, unresponsive to questioning. Patient unable to contribute to remainder of interval history and ROS due to mental status.       MEDICATIONS  (STANDING):  albuterol/ipratropium for Nebulization 3 milliLiter(s) Nebulizer every 6 hours  allopurinol 100 milliGRAM(s) Oral daily  aspirin enteric coated 81 milliGRAM(s) Oral daily  atorvastatin 10 milliGRAM(s) Oral at bedtime  azithromycin  IVPB 500 milliGRAM(s) IV Intermittent every 24 hours  chlorhexidine 0.12% Liquid 15 milliLiter(s) Oral Mucosa every 12 hours  cholecalciferol 1000 Unit(s) Oral daily  dextrose 5% + lactated ringers. 1000 milliLiter(s) (100 mL/Hr) IV Continuous <Continuous>  heparin   Injectable 5000 Unit(s) SubCutaneous every 12 hours  influenza   Vaccine 0.5 milliLiter(s) IntraMuscular once  lactobacillus acidophilus 1 Tablet(s) Oral daily  levothyroxine 100 MICROGram(s) Oral daily  pantoprazole    Tablet 40 milliGRAM(s) Oral before breakfast  piperacillin/tazobactam IVPB.. 3.375 Gram(s) IV Intermittent every 8 hours  propofol Infusion 10 MICROgram(s)/kG/Min (2.88 mL/Hr) IV Continuous <Continuous>  sertraline 100 milliGRAM(s) Oral daily    MEDICATIONS  (PRN):  acetaminophen   Tablet .. 650 milliGRAM(s) Oral every 6 hours PRN Temp greater or equal to 38C (100.4F), Mild Pain (1 - 3)  aluminum hydroxide/magnesium hydroxide/simethicone Suspension 30 milliLiter(s) Oral every 4 hours PRN Dyspepsia  benzocaine 15 mG/menthol 3.6 mG (Sugar-Free) Lozenge 1 Lozenge Oral two times a day PRN Sore Throat  fentaNYL    Injectable 12.5 MICROGram(s) IV Push once PRN Moderate Pain (4 - 6)  gabapentin 300 milliGRAM(s) Oral once PRN Phantom pain  melatonin 3 milliGRAM(s) Oral at bedtime PRN Insomnia  montelukast 10 milliGRAM(s) Oral daily PRN allergies  ondansetron Injectable 4 milliGRAM(s) IV Push every 8 hours PRN Nausea and/or Vomiting      Allergies    No Known Allergies    Intolerances        REVIEW OF SYSTEMS: unable to obtain due to mental status     Vital Signs Last 24 Hrs  T(C): 35.6 (18 Oct 2021 12:00), Max: 37.3 (18 Oct 2021 04:00)  T(F): 96.1 (18 Oct 2021 12:00), Max: 99.1 (18 Oct 2021 04:00)  HR: 128 (18 Oct 2021 12:10) (106 - 128)  BP: 74/37 (18 Oct 2021 10:30) (69/50 - 117/79)  BP(mean): 50 (18 Oct 2021 10:30) (50 - 95)  RR: 34 (18 Oct 2021 10:30) (25 - 45)  SpO2: 58% (18 Oct 2021 10:30) (55% - 93%)    PHYSICAL EXAM:  GENERAL: not in acute distress, on BiPAP   HEENT:  anicteric, moist mucous membranes  CHEST/LUNG:  CTA b/l, no rales, wheezes, or rhonchi  HEART:  tachycardic, regular rhythm, S1, S2  ABDOMEN:  BS+, soft, nontender, nondistended; +RLQ hernia  EXTREMITIES: no edema, cyanosis, or calf tenderness; Right AKA; no LLE edema and TTP noted  NERVOUS SYSTEM: lethargic, does not answers questions and follows commands appropriately      LABS:                        11.4   32.48 )-----------( 423      ( 18 Oct 2021 06:34 )             39.1     CBC Full  -  ( 18 Oct 2021 06:34 )  WBC Count : 32.48 K/uL  Hemoglobin : 11.4 g/dL  Hematocrit : 39.1 %  Platelet Count - Automated : 423 K/uL  Mean Cell Volume : 88.7 fl  Mean Cell Hemoglobin : 25.9 pg  Mean Cell Hemoglobin Concentration : 29.2 gm/dL  Auto Neutrophil # : x  Auto Lymphocyte # : x  Auto Monocyte # : x  Auto Eosinophil # : x  Auto Basophil # : x  Auto Neutrophil % : x  Auto Lymphocyte % : x  Auto Monocyte % : x  Auto Eosinophil % : x  Auto Basophil % : x    18 Oct 2021 06:34    146    |  118    |  10     ----------------------------<  149    4.1     |  21     |  0.36     Ca    7.2        18 Oct 2021 06:34  Phos  2.7       18 Oct 2021 06:34  Mg     1.9       18 Oct 2021 06:34    TPro  4.7    /  Alb  1.5    /  TBili  0.4    /  DBili  x      /  AST  101    /  ALT  49     /  AlkPhos  188    18 Oct 2021 06:34        CAPILLARY BLOOD GLUCOSE      POCT Blood Glucose.: 170 mg/dL (17 Oct 2021 19:11)        Culture - Blood (collected 10-16-21 @ 21:07)  Source: .Blood Blood-Venous  Preliminary Report (10-17-21 @ 22:01):    No growth to date.    Culture - Blood (collected 10-16-21 @ 21:07)  Source: .Blood Blood-Venous  Preliminary Report (10-17-21 @ 22:01):    No growth to date.        RADIOLOGY & ADDITIONAL TESTS:  Personally reviewed.   EXAM:  XR CHEST PORTABLE IMMED 1V                            PROCEDURE DATE:  10/18/2021          INTERPRETATION:  Chest portable.    Clinical History: Line placement.    Comparison: 10/17/2021.    Single AP view submitted.  Multiple leads overlie the chest degrading image quality limiting evaluation.    Endotracheal tube, tip above the level honorio.  Nasogastric tube, distal tip not visualized but below the hemidiaphragm.    The evaluation of the cardiomediastinal silhouette is limited on portable technique.    Again noted are diffuse coarse reticulonodular interstitial infiltrates.  Mild biapical pleural thickening.  No pneumothorax noted.    Surgical clips right axilla.  Partially imaged stent overlying the mid abdomen.    Impression:    Findings as discussed above.        --- End of Report ---            JAY CLAY MD; Attending Radiologist  This document has been electronically signed. Oct 18 2021  1:20PM      Consultant(s) Notes Reviewed:  [x] YES  [ ] NO     Patient is a 75y old  Female who presents with a chief complaint of weakness, SOB.      INTERVAL HPI/OVERNIGHT EVENTS: Overnight, rapid response called for hypoxia - SpO2 90s on 50% venti and hypotension - SBP 42 --> 62. Patient initially placed on NRB and transitioned to BiPAP due to AMS and AB.22/54/65/22. 1L bolus NS given and patient transferred to ICU. Patient seen and examined at bedside in ICU. On BiPAP, still with AMS, unresponsive to questioning. Patient unable to contribute to remainder of interval history and ROS due to mental status.       MEDICATIONS  (STANDING):  albuterol/ipratropium for Nebulization 3 milliLiter(s) Nebulizer every 6 hours  allopurinol 100 milliGRAM(s) Oral daily  aspirin enteric coated 81 milliGRAM(s) Oral daily  atorvastatin 10 milliGRAM(s) Oral at bedtime  azithromycin  IVPB 500 milliGRAM(s) IV Intermittent every 24 hours  chlorhexidine 0.12% Liquid 15 milliLiter(s) Oral Mucosa every 12 hours  cholecalciferol 1000 Unit(s) Oral daily  dextrose 5% + lactated ringers. 1000 milliLiter(s) (100 mL/Hr) IV Continuous <Continuous>  heparin   Injectable 5000 Unit(s) SubCutaneous every 12 hours  influenza   Vaccine 0.5 milliLiter(s) IntraMuscular once  lactobacillus acidophilus 1 Tablet(s) Oral daily  levothyroxine 100 MICROGram(s) Oral daily  pantoprazole    Tablet 40 milliGRAM(s) Oral before breakfast  piperacillin/tazobactam IVPB.. 3.375 Gram(s) IV Intermittent every 8 hours  propofol Infusion 10 MICROgram(s)/kG/Min (2.88 mL/Hr) IV Continuous <Continuous>  sertraline 100 milliGRAM(s) Oral daily    MEDICATIONS  (PRN):  acetaminophen   Tablet .. 650 milliGRAM(s) Oral every 6 hours PRN Temp greater or equal to 38C (100.4F), Mild Pain (1 - 3)  aluminum hydroxide/magnesium hydroxide/simethicone Suspension 30 milliLiter(s) Oral every 4 hours PRN Dyspepsia  benzocaine 15 mG/menthol 3.6 mG (Sugar-Free) Lozenge 1 Lozenge Oral two times a day PRN Sore Throat  fentaNYL    Injectable 12.5 MICROGram(s) IV Push once PRN Moderate Pain (4 - 6)  gabapentin 300 milliGRAM(s) Oral once PRN Phantom pain  melatonin 3 milliGRAM(s) Oral at bedtime PRN Insomnia  montelukast 10 milliGRAM(s) Oral daily PRN allergies  ondansetron Injectable 4 milliGRAM(s) IV Push every 8 hours PRN Nausea and/or Vomiting      Allergies    No Known Allergies    Intolerances        REVIEW OF SYSTEMS: unable to obtain due to mental status     Vital Signs Last 24 Hrs  T(C): 35.6 (18 Oct 2021 12:00), Max: 37.3 (18 Oct 2021 04:00)  T(F): 96.1 (18 Oct 2021 12:00), Max: 99.1 (18 Oct 2021 04:00)  HR: 128 (18 Oct 2021 12:10) (106 - 128)  BP: 74/37 (18 Oct 2021 10:30) (69/50 - 117/79)  BP(mean): 50 (18 Oct 2021 10:30) (50 - 95)  RR: 34 (18 Oct 2021 10:30) (25 - 45)  SpO2: 58% (18 Oct 2021 10:30) (55% - 93%)    PHYSICAL EXAM:  GENERAL: ill-appearing, on BiPAP   HEENT:  anicteric, moist mucous membranes  CHEST/LUNG:  decreased breath sounds  HEART:  tachycardic, regular rhythm, S1, S2  ABDOMEN:  BS+, soft, nontender, nondistended; +RLQ hernia  EXTREMITIES: no edema, cyanosis, or calf tenderness; Right AKA; no LLE edema and TTP noted  NERVOUS SYSTEM: lethargic, does not answers questions or follows commands       LABS:                        11.4   32.48 )-----------( 423      ( 18 Oct 2021 06:34 )             39.1     CBC Full  -  ( 18 Oct 2021 06:34 )  WBC Count : 32.48 K/uL  Hemoglobin : 11.4 g/dL  Hematocrit : 39.1 %  Platelet Count - Automated : 423 K/uL  Mean Cell Volume : 88.7 fl  Mean Cell Hemoglobin : 25.9 pg  Mean Cell Hemoglobin Concentration : 29.2 gm/dL  Auto Neutrophil # : x  Auto Lymphocyte # : x  Auto Monocyte # : x  Auto Eosinophil # : x  Auto Basophil # : x  Auto Neutrophil % : x  Auto Lymphocyte % : x  Auto Monocyte % : x  Auto Eosinophil % : x  Auto Basophil % : x    18 Oct 2021 06:34    146    |  118    |  10     ----------------------------<  149    4.1     |  21     |  0.36     Ca    7.2        18 Oct 2021 06:34  Phos  2.7       18 Oct 2021 06:34  Mg     1.9       18 Oct 2021 06:34    TPro  4.7    /  Alb  1.5    /  TBili  0.4    /  DBili  x      /  AST  101    /  ALT  49     /  AlkPhos  188    18 Oct 2021 06:34        CAPILLARY BLOOD GLUCOSE      POCT Blood Glucose.: 170 mg/dL (17 Oct 2021 19:11)        Culture - Blood (collected 10-16-21 @ 21:07)  Source: .Blood Blood-Venous  Preliminary Report (10-17-21 @ 22:01):    No growth to date.    Culture - Blood (collected 10-16-21 @ 21:07)  Source: .Blood Blood-Venous  Preliminary Report (10-17-21 @ 22:01):    No growth to date.        RADIOLOGY & ADDITIONAL TESTS:  Personally reviewed.   EXAM:  XR CHEST PORTABLE IMMED 1V                            PROCEDURE DATE:  10/18/2021          INTERPRETATION:  Chest portable.    Clinical History: Line placement.    Comparison: 10/17/2021.    Single AP view submitted.  Multiple leads overlie the chest degrading image quality limiting evaluation.    Endotracheal tube, tip above the level honorio.  Nasogastric tube, distal tip not visualized but below the hemidiaphragm.    The evaluation of the cardiomediastinal silhouette is limited on portable technique.    Again noted are diffuse coarse reticulonodular interstitial infiltrates.  Mild biapical pleural thickening.  No pneumothorax noted.    Surgical clips right axilla.  Partially imaged stent overlying the mid abdomen.    Impression:    Findings as discussed above.        --- End of Report ---            JAY CLAY MD; Attending Radiologist  This document has been electronically signed. Oct 18 2021  1:20PM      Consultant(s) Notes Reviewed:  [x] YES  [ ] NO

## 2021-10-18 NOTE — PROGRESS NOTE ADULT - PROBLEM SELECTOR PLAN 3
Hx of HTN, on antihypertensives at home   - BP hypotensive on arrival 88/57, and remains in hypotensive range   - continue IVF D5 + NS +KCl 20 mEq at 70 cc/hr   - hold home Toprol 25mg qd  - hold home nifedipine 30mg qd (takes for vasodilator effects)  - monitor hemodynamics Hx of HTN, on antihypertensives at home   - BP hypotensive on arrival 88/57, and remains in hypotensive range likely 2/2 sepsis   - continue IVF D5LR @ 100 cc/hr   - hold home Toprol 25mg qd  - hold home nifedipine 30mg qd (takes for vasodilator effects)  - monitor hemodynamics - CT Chest: Groundglass opacities in the right lung. There is left lower lobe opacity measuring 3.8 x 2.8 cm. Emphysema. There is also a 2.3 x 3.3 cm left lower lobe medial opacity abutting the pericardium. Moderate loculated left pleural effusion. Nodular left-sided pleural thickening is noted. Trace right pleural effusion.  - plan as above  - will f/up pleural fluid analysis / cytology  - work-up further as pt stabilizes from likely overlying sepsis / PNA

## 2021-10-18 NOTE — DIETITIAN NUTRITION RISK NOTIFICATION - TREATMENT: THE FOLLOWING DIET HAS BEEN RECOMMENDED
Diet, Regular:   Dysphagia 2, Mechanical Soft, Honey Consistency Fluid (DYS2HC) (10-17-21 @ 13:11) [Active]      await SLP eval/palliative care for GOC discussion

## 2021-10-18 NOTE — DIETITIAN INITIAL EVALUATION ADULT. - PROBLEM SELECTOR PLAN 7
Hx of HTN, hypotensive on arrival)  Patient was hypotensive in ED, BP 88/57   - hold Toprol 25mg qd  - hold nifedipine 30mg qd (takes for vasodilator effects)

## 2021-10-19 NOTE — PHARMACOTHERAPY INTERVENTION NOTE - COMMENTS
Patient is a 75 year old male ordered for atorvastatin 10mg daily and acetaminophen 650mg PO q6 hours PRN. Patient’s AST level increased to 1066 on 10/19 from 198 on 10/18. Discussed plan with Dr. La and recommended discontinuation of both medications due to elevated liver enzymes, MD agreed. Patient was ordered for Nimbex, fentanyl, propofol, milrinone, Levophed, phenylephrine, and vasopressin infusions with varying titration goals. Spoke to MD and suggested adjustment of RASS score goal for fentanyl and prpofol to -4 to -5, MAP 65-70 for levophed, and removal of titration instructions on the vasopressin, MD agreed.

## 2021-10-19 NOTE — PROGRESS NOTE ADULT - PROBLEM SELECTOR PLAN 4
Hx of HTN, on antihypertensives at home   - BP hypotensive on arrival 88/57, and remains in hypotensive range likely 2/2 sepsis   - now on pressor support with levophed, vasopressin, phenylephrine, milrinone  - continue IVF D5LR @ 100 cc/hr   - hold home Toprol 25mg qd  - hold home nifedipine 30mg qd (takes for vasodilator effects)  - monitor hemodynamics Hx of HTN, on antihypertensives at home   - BP hypotensive on arrival 88/57, and remains in hypotensive range likely 2/2 sepsis   - LV failure on POCUS suggestive of cardiogenic shock  - now on pressor support with levophed, vasopressin, phenylephrine, milrinone  - continue IVF D5LR @ 100 cc/hr   - hold home Toprol 25mg qd  - hold home nifedipine 30mg qd (takes for vasodilator effects)  - monitor hemodynamics

## 2021-10-19 NOTE — PROGRESS NOTE ADULT - SUBJECTIVE AND OBJECTIVE BOX
[facility]  Division of Infectious Diseases  [phone]    Name: MARIANN PARISI  Age: 75y  Gender: Female  MRN: [id number]    Interval History--  Notes reviewed.     Past Medical History--  No pertinent past medical history    Hypertension    Peripheral vascular disease    HLD (hyperlipidemia)    Hypothyroidism    Esophageal reflux    History of depression    History of methicillin resistant staphylococcus aureus (MRSA)    History of tuberculosis    Peripheral vascular disease    S/P AKA (above knee amputation), right    H/O shoulder surgery    History of total hip replacement    H/O: hysterectomy        For details regarding the patient's social history, family history, and other miscellaneous elements, please refer the initial infectious diseases consultation and/or the admitting history and physical examination for this admission.    Allergies    No Known Allergies    Intolerances        Medications--  Antibiotics:  piperacillin/tazobactam IVPB.. 3.375 Gram(s) IV Intermittent every 12 hours    Immunologic:  influenza   Vaccine 0.5 milliLiter(s) IntraMuscular once    Other:  acetaminophen   Tablet .. PRN  albumin human 25% IVPB  albuterol/ipratropium for Nebulization  allopurinol  aluminum hydroxide/magnesium hydroxide/simethicone Suspension PRN  aspirin enteric coated  atorvastatin  benzocaine 15 mG/menthol 3.6 mG (Sugar-Free) Lozenge PRN  buDESOnide    Inhalation Suspension  chlorhexidine 0.12% Liquid  chlorhexidine 4% Liquid  cholecalciferol  cisatracurium Infusion  dextrose 5% + lactated ringers.  fentaNYL    Injectable PRN  fentaNYL   Infusion.  gabapentin PRN  heparin   Injectable  hydrocortisone sodium succinate Injectable  ketamine Infusion.  lactobacillus acidophilus  levothyroxine  melatonin PRN  milrinone Infusion  montelukast PRN  norepinephrine Infusion  ondansetron Injectable PRN  pantoprazole    Tablet  phenylephrine    Infusion  propofol Infusion  sertraline  sodium bicarbonate  Infusion  sodium bicarbonate  Injectable  vasopressin Infusion      Review of Systems--  A 10-point review of systems was obtained.     Pertinent positives and negatives--  Constitutional: No fevers. No Chills. No Rigors.   Cardiovascular: No chest pain. No palpitations.  Respiratory: No shortness of breath. No cough.  Gastrointestinal: No nausea or vomiting. No diarrhea or constipation.   Psychiatric: Pleasant. Appropriate affect.    Review of systems otherwise negative except as previously noted.    Physical Examination--  Vital Signs: T(F): 96.8 (10-19-21 @ 07:24), Max: 101.7 (10-18-21 @ 20:00)  HR: 110 (10-19-21 @ 09:52)  BP: 70/39 (10-19-21 @ 09:00)  RR: 30 (10-19-21 @ 09:15)  SpO2: 52% (10-19-21 @ 08:00)  Wt(kg): --  General: Nontoxic-appearing Female in no acute distress.  HEENT: AT/NC. PERRL. EOMI. Anicteric. Conjunctiva pink and moist. Oropharynx clear. Dentition fair.  Neck: Not rigid. No sense of mass.  Nodes: None palpable.  Lungs: Clear bilaterally without rales, wheezing or rhonchi  Heart: Regular rate and rhythm. No Murmur. No rub. No gallop. No palpable thrill.  Abdomen: Bowel sounds present and normoactive. Soft. Nondistended. Nontender. No sense of mass. No organomegaly.  Back: No spinal tenderness. No costovertebral angle tenderness.   Extremities: No cyanosis or clubbing. No edema.   Skin: Warm. Dry. Good turgor. No rash. No vasculitic stigmata.  Psychiatric: Appropriate affect and mood for situation.         Laboratory Studies--  CBC                        9.8    36.85 )-----------( 376      ( 19 Oct 2021 06:17 )             34.9       Chemistries  10-19    145  |  114<H>  |  17  ----------------------------<  106<H>  4.4   |  11<L>  |  1.10    Ca    6.7<L>      19 Oct 2021 06:17  Phos  6.0     10-19  Mg     2.1     10-19    TPro  3.8<L>  /  Alb  1.2<L>  /  TBili  0.9  /  DBili  x   /  AST  1066<H>  /  ALT  212<H>  /  AlkPhos  161<H>  10-19      Culture Data    Culture - Fungal, Bronchial (collected 19 Oct 2021 00:33)  Source: BAL Bronchoalveolar Lavage  Preliminary Report (19 Oct 2021 06:49):    Testing in progress    Culture - Bronchial (collected 19 Oct 2021 00:33)  Source: Bronch Wash Bronchoalveolar Lavage  Gram Stain (19 Oct 2021 04:22):    Numerous polymorphonuclear leukocytes per low power field    No squamous epithelial cells per low power field    No organisms seen per oil power field    Culture - Blood (collected 16 Oct 2021 21:07)  Source: .Blood Blood-Venous  Preliminary Report (17 Oct 2021 22:01):    No growth to date.    Culture - Blood (collected 16 Oct 2021 21:07)  Source: .Blood Blood-Venous  Preliminary Report (17 Oct 2021 22:01):    No growth to date.             [facility]  Division of Infectious Diseases  [phone]    Name: MARIANN PARISI  Age: 75y  Gender: Female  MRN: [id number]    Interval History--  Notes reviewed. Seen earlier today. Intubated, s/p bronchoscopy. D/W Dr. Springer, pleural fluid sampling deferred. In shock, BROCK, likely shock liver.      Past Medical History--  No pertinent past medical history    Hypertension    Peripheral vascular disease    HLD (hyperlipidemia)    Hypothyroidism    Esophageal reflux    History of depression    History of methicillin resistant staphylococcus aureus (MRSA)    History of tuberculosis    Peripheral vascular disease    S/P AKA (above knee amputation), right    H/O shoulder surgery    History of total hip replacement    H/O: hysterectomy        For details regarding the patient's social history, family history, and other miscellaneous elements, please refer the initial infectious diseases consultation and/or the admitting history and physical examination for this admission.    Allergies    No Known Allergies    Intolerances        Medications--  Antibiotics:  piperacillin/tazobactam IVPB.. 3.375 Gram(s) IV Intermittent every 12 hours    Immunologic:  influenza   Vaccine 0.5 milliLiter(s) IntraMuscular once    Other:  acetaminophen   Tablet .. PRN  albumin human 25% IVPB  albuterol/ipratropium for Nebulization  allopurinol  aluminum hydroxide/magnesium hydroxide/simethicone Suspension PRN  aspirin enteric coated  atorvastatin  benzocaine 15 mG/menthol 3.6 mG (Sugar-Free) Lozenge PRN  buDESOnide    Inhalation Suspension  chlorhexidine 0.12% Liquid  chlorhexidine 4% Liquid  cholecalciferol  cisatracurium Infusion  dextrose 5% + lactated ringers.  fentaNYL    Injectable PRN  fentaNYL   Infusion.  gabapentin PRN  heparin   Injectable  hydrocortisone sodium succinate Injectable  ketamine Infusion.  lactobacillus acidophilus  levothyroxine  melatonin PRN  milrinone Infusion  montelukast PRN  norepinephrine Infusion  ondansetron Injectable PRN  pantoprazole    Tablet  phenylephrine    Infusion  propofol Infusion  sertraline  sodium bicarbonate  Infusion  sodium bicarbonate  Injectable  vasopressin Infusion      Review of Systems--  Review of systems unable secondary to clinical condition.     Physical Examination--  Vital Signs: T(F): 96.8 (10-19-21 @ 07:24), Max: 101.7 (10-18-21 @ 20:00)  HR: 110 (10-19-21 @ 09:52)  BP: 70/39 (10-19-21 @ 09:00)  RR: 30 (10-19-21 @ 09:15)  SpO2: 52% (10-19-21 @ 08:00)  Wt(kg): --  General: Chronically ill-appearing Female in Mild respiratory distress  HEENT: AT/NC. Pupils/EOM unable secondary to patient compliance. Anicteric. Conjunctiva pink and moist. Oropharynx/dentition unable secondary to patient compliance. OETT,  Neck: Not rigid. No sense of mass.  Nodes: None palpable.  Lungs: Coarse bilateral breath sounds  Heart: Tachycardic with regular rhythm.  1/6 systolic murmur.  No rub, gallop, or palpable thrill.  Abdomen: Bowel sounds present and normoactive. Soft. Nondistended. Nontender. No sense of mass. No organomegaly.  Back: No spinal tenderness. No costovertebral angle tenderness.   Extremities: No cyanosis or clubbing. No edema. Right above-knee amputation.  Extremities wasted.  Skin: Cool-cold to touch. Dry. Good turgor. Hyperpigmentation consistent with tissue deposition/chronic tetracycline use.. No vasculitic stigmata.  Psychiatric: Unable.       Laboratory Studies--  CBC                        9.8    36.85 )-----------( 376      ( 19 Oct 2021 06:17 )             34.9     WBC trend  WBC Count: 36.85 K/uL (10-19-21 @ 06:17)  WBC Count: 38.78 K/uL (10-19-21 @ 02:12)  WBC Count: 42.00 K/uL (10-18-21 @ 18:15)  WBC Count: 32.48 K/uL (10-18-21 @ 06:34)  WBC Count: 20.16 K/uL (10-17-21 @ 10:57)  WBC Count: 15.66 K/uL (10-16-21 @ 16:05)      Chemistries  10-19    145  |  114<H>  |  17  ----------------------------<  106<H>  4.4   |  11<L>  |  1.10    Lactate, Blood: 12.6 mmol/L (10-19-21 @ 09:19)  Lactate, Blood: 9.5 mmol/L (10-19-21 @ 02:12)  Lactate, Blood: 3.9 mmol/L (10-18-21 @ 18:15)  Lactate, Blood: 2.6 mmol/L (10-18-21 @ 06:34)  Lactate, Blood: 2.3 mmol/L (10-17-21 @ 21:25)  Lactate, Blood: 2.0 mmol/L (10-16-21 @ 20:14)  Lactate, Blood: 2.5 mmol/L (10-16-21 @ 17:11)      Ca    6.7<L>      19 Oct 2021 06:17  Phos  6.0     10-19  Mg     2.1     10-19    LFT Trend  Total Bilirubin  0.9 mg/dL (10-19-21 @ 06:17)  0.5 mg/dL (10-19-21 @ 02:12)  0.6 mg/dL (10-18-21 @ 18:16)  0.4 mg/dL (10-18-21 @ 06:34)  0.5 mg/dL (10-17-21 @ 10:57)  0.4 mg/dL (10-16-21 @ 16:05)    Direct Bilirubin    Indirect Bilirubin    AKP  161 U/L (10-19-21 @ 06:17)  168 U/L (10-19-21 @ 02:12)  183 U/L (10-18-21 @ 18:16)  188 U/L (10-18-21 @ 06:34)  197 U/L (10-17-21 @ 10:57)  211 U/L (10-16-21 @ 16:05)    AST  1066 U/L (10-19-21 @ 06:17)  160 U/L (10-19-21 @ 02:12)  198 U/L (10-18-21 @ 18:16)  101 U/L (10-18-21 @ 06:34)  62 U/L (10-17-21 @ 10:57)  67 U/L (10-16-21 @ 16:05)    ALT  212 U/L (10-19-21 @ 06:17)  66 U/L (10-19-21 @ 02:12)  72 U/L (10-18-21 @ 18:16)  49 U/L (10-18-21 @ 06:34)  31 U/L (10-17-21 @ 10:57)  33 U/L (10-16-21 @ 16:05)    Legionella negative    MRSA PCR negative    Blood Gas Profile - Arterial (10.19.21 @ 06:44)    pH, Arterial: 7.00    pCO2, Arterial: 38 mmHg    pO2, Arterial: 78 mmHg    HCO3, Arterial: 9 mmol/L    Base Excess, Arterial: -21.9 mmol/L    Oxygen Saturation, Arterial: 95.6 %    FIO2, Arterial: 100.0    Blood Gas Comments Arterial: ccpa aware    Blood Gas Source Arterial: Arterial        Culture Data  Culture - Fungal, Bronchial (collected 19 Oct 2021 00:33)  Source: BAL Bronchoalveolar Lavage  Preliminary Report (19 Oct 2021 06:49):    Testing in progress    Culture - Bronchial (collected 19 Oct 2021 00:33)  Source: Bronch Wash Bronchoalveolar Lavage  Gram Stain (19 Oct 2021 04:22):    Numerous polymorphonuclear leukocytes per low power field    No squamous epithelial cells per low power field    No organisms seen per oil power field    Culture - Blood (collected 16 Oct 2021 21:07)  Source: .Blood Blood-Venous  Preliminary Report (17 Oct 2021 22:01):    No growth to date.    Culture - Blood (collected 16 Oct 2021 21:07)  Source: .Blood Blood-Venous  Preliminary Report (17 Oct 2021 22:01):    No growth to date.

## 2021-10-19 NOTE — PROCEDURE NOTE - NSPROCNAME_GEN_A_CORE
Cardioversion
Central Line Insertion
Arterial Puncture/Cannulation
Arterial Puncture/Cannulation
Gastric Intubation/Gastric Lavage
Tracheal Intubation

## 2021-10-19 NOTE — PROCEDURE NOTE - NSPROCDETAILS_GEN_ALL_CORE
patient pre-oxygenated, tube inserted, placement confirmed
orogastric
guidewire recovered/lumen(s) aspirated and flushed/sterile dressing applied/sterile technique, catheter placed/ultrasound guidance with use of sterile gel and probe cove
location identified, draped/prepped, sterile technique used, needle inserted/introduced/positive blood return obtained via catheter/connected to a pressurized flush line/sutured in place/hemostasis with direct pressure, dressing applied/Seldinger technique/all materials/supplies accounted for at end of procedure
location identified, draped/prepped, sterile technique used, needle inserted/introduced/positive blood return obtained via catheter/connected to a pressurized flush line/sutured in place/hemostasis with direct pressure, dressing applied/Seldinger technique/all materials/supplies accounted for at end of procedure

## 2021-10-19 NOTE — PROVIDER CONTACT NOTE (CRITICAL VALUE NOTIFICATION) - PERSON GIVING RESULT:
Kailee A
Saran Soto, RRT
ME
Madelyn-lab
kristian
teressa d
dirk anglin
RUBY anglin
Sania morales RRT/Respiratory Services
danica parekh
Kailee Hong
Lab
Sena Zamudio/ Respiratory Care

## 2021-10-19 NOTE — PROGRESS NOTE ADULT - PROBLEM SELECTOR PLAN 2
Patient presented with worsening SOB, PRO. SpO2 88% with EMS, now worsening acidosis, hypoxemia and hypercarbia despite BiPAP.   - Now on mechanical ventilation in ICU for acute hypoxic and hypercarbic respiratory failure.  - CRP 29, CEA 35.7, B12 and folate wnl   - CT Chest: Groundglass opacities in the right lung. There is left lower lobe opacity measuring 3.8 x 2.8 cm. Emphysema. There is also a 2.3 x 3.3 cm left lower lobe medial opacity abutting the pericardium. Moderate loculated left pleural effusion. Nodular left-sided pleural thickening is noted. Trace right pleural effusion.  - Pt admitted that she coughs when drinking thin liquids -suspect pt has chronic aspiration  - Concern for possible malignancy with superimposed pneumonia given CT findings.  - Pt also with recorded history of ?TB --- discussed with pt's daughter who stated that pt had work-up for imaging finding and they were told that pt likely had small TB scar from childhood and that she had no evidence of TB infection here. Pt has been in the [place] for decades and not traveled out.  - CXR: worsening L pleural effusion and bilateral patchy opacities.   - Metabolic acidosis worsening. Most recent ABG this afternoon 7.11/41/51/13  - MRSA nares PCR, legionella urine Ag both negative  - BCx NGTD  - US guided thoracentesis to evaluate pleural analysis of effusion to be done once patient clinically stable -- concern pt may have parapneumonic effusion or empyema ; can also be malignant effusion (or both)  - pneumonia management as above   - continue with albuterol, ipratropium, and budesonide nebs.   - continue solucortef 100mg q8 hrs  - Pulm (Dr. Porter/Blake) consulted, recs appreciated   - ID consult (Dr. Ruth/Caleb), recs appreciated

## 2021-10-19 NOTE — PROGRESS NOTE ADULT - PROBLEM SELECTOR PLAN 5
#Hypokalemia   - Resolved s/p KCl riders   - Monitor with daily labs     #Hypernatremia  - Resolved  -Likely 2/2 to dehydration from decreased PO intake   - continue IVF D5NS @ 100 cc/hr   - Monitor with daily labs

## 2021-10-19 NOTE — PROVIDER CONTACT NOTE (CRITICAL VALUE NOTIFICATION) - SITUATION
bicarb 2 amps
PMD on unit, seeing pt, will address K of 2.9
pt is on several pressors  , nimbex   ketamine  and iv hydration

## 2021-10-19 NOTE — PROVIDER CONTACT NOTE (CRITICAL VALUE NOTIFICATION) - ACTION/TREATMENT ORDERED:
increase FiO2 to 100%
no PA orders  at this time
No action/treatment ordered
No new orders given
increase rr to 34
No changes
lr bolus 1 liter
PMD on unit at this time, made aware of value and will order 3 K riders.  Pt remains stable.
no vent changes at this time
No new orders given
no vent changes at this time

## 2021-10-19 NOTE — PROGRESS NOTE ADULT - NUTRITIONAL ASSESSMENT
This patient has been assessed with a concern for Malnutrition and has been determined to have a diagnosis/diagnoses of Severe protein-calorie malnutrition.    This patient is being managed with:   Diet NPO-  Entered: Oct 18 2021  7:50PM    
This patient has been assessed with a concern for Malnutrition and has been determined to have a diagnosis/diagnoses of Severe protein-calorie malnutrition.    This patient is being managed with:   Diet Regular-  Dysphagia 2 Mechanical Soft Honey Consistency Fluid (DYS2HC)  Entered: Oct 17 2021  1:11PM    
This patient has been assessed with a concern for Malnutrition and has been determined to have a diagnosis/diagnoses of Severe protein-calorie malnutrition.    This patient is being managed with:   Diet NPO-  Entered: Oct 18 2021  7:50PM    
This patient has been assessed with a concern for Malnutrition and has been determined to have a diagnosis/diagnoses of Severe protein-calorie malnutrition.    This patient is being managed with:   Diet NPO-  Entered: Oct 18 2021  7:50PM    
This patient has been assessed with a concern for Malnutrition and has been determined to have a diagnosis/diagnoses of Severe protein-calorie malnutrition.    This patient is being managed with:   Diet NPO with Tube Feed-  Tube Feeding Modality: Orogastric  Nepro with Carb Steady  Total Volume for 24 Hours (mL): 1080  Continuous  Starting Tube Feed Rate {mL per Hour}: 20     Every 6 hours  Until Goal Tube Feed Rate (mL per Hour): 45  Tube Feed Duration (in Hours): 24  Tube Feed Start Time: 09:00  Entered: Oct 19 2021  8:54AM    
This patient has been assessed with a concern for Malnutrition and has been determined to have a diagnosis/diagnoses of Severe protein-calorie malnutrition.    This patient is being managed with:   Diet Regular-  Dysphagia 2 Mechanical Soft Honey Consistency Fluid (DYS2HC)  Entered: Oct 17 2021  1:11PM

## 2021-10-19 NOTE — PROGRESS NOTE ADULT - ATTENDING COMMENTS
75F PMH HTN, HLD, carotid artery stenosis s/p stent, PVD s/p R AKA, s/p right axillary-femoral bypass, former smoker, COPD, depression, hypothyroidism, h/o TB, and h/o MRSA infection on chronic minocycline therapy who presents with acute hypoxemic and hypercapnic respiratory failure, found on CT chest to have diffuse GGO's with underlying emphysema (possible cardiogenic edema), a loculated left pleural effusion, and LLL rounded opacity worrisome for malignancy given smoking history. Today with worsening hypoxemia and encephalopathy despite BiPAP requiring intubation. Family reports recent episodes of coughing/choking with drinking water. S/p bronchoscopy with BAL on 10/18.    1. Neuro: sedation with propofol  2. CV: septic shock secondary to multifocal pneumonia, continue norepinephrine and add vasopressin for goal MAP>65. S/p IVF resuscitation. Has B lines on ultrasound of unclear etiology given that LV systolic function is normal with normal LVOT VTI. IVC is 1.5 cm with inspiratory variation  3. Pulm: lung protective ventilation with permissive hypercapnia. Currently on volume a/c 30/400/100%/10. Has diffuse expiratory wheezing on exam. Start albuterol, ipratropium, and budesonide nebs. Methylprednisolone 40 mg IV q8h  4. ID: blood cultures negative, f/up BAL cultures. Urine legionella and Strep urine antigen negative. Continue Zosyn. Hold Vanc given negative nasal MRSA. Hold minocycline given skin discoloration due to chronic tetracycline use. D/c azithro. S/p bronch today, f/up cultures, cell count, and cytopath  5. GI: keep NPO post-intubation for bronch, start feeds in AM if remains stable  6. Renal: now with oliguria, likely developing ATN due to shock/sepsis  7. Heme: HSQ for DVT ppx  8. Endo: continue synthroid  9. Skin: L SC TLC 10/18, leon  10. Dispo: very poor overall prognosis, she is actively decompensating, remains full code. Discussed with , daughter, and son-in-law at bedside  CC time spent: 90 min
Today with refractory shock despite maximal doses of vasopressin, norepinephrine, and phenylephrine. Found to have new severe global LV systolic dysfunction on bedside echo with VTI of 9, which is markedly reduced from yesterday and consistent with cardiogenic shock. Presentation consistent with mixed septic and cardiogenic shock in the setting of severe pneumonia with acute hypoxemic and hypercapnic respiratory failure with multiorgan failure.    She was started on milrinone infusion for the cardiogenic shock component. Remains on pressors and antibiotics for the septic shock. Also given stress steroids with hydrocortisone for refractory shock. However, despite maximal resuscitation, she developed worsening metabolic acidosis, BROCK 2/2 ATN, shock liver, worsening lactic acidosis, and severe ARDS.     Family elected against dialysis given terminal prognosis. She was made DNR. Discussed with , daughter, and son. No escalation of care. Understand the severity of patient's current illness. Patient passed away at 3:10 pm. Family at bedside informed. Very sad and rapid decline.     CC time spent: 50 min    CARDIAC POCUS:  Severe global LV systolic dysfunction  LVOT VTI 9 cm  Normal RV size and systolic function  IVC<2cm and collapsible  No significant pericardial effusion    Interpretation:  Severe cardiogenic shock with decreased cardiac output
ON events noted  Afib RVR with failed cardioversion  Remains hypotensive with MAPS 40-50s, hypothermic, with worsening BROCK despite multiple pressors, steroids, albumin, IV antibiotics, with severe metabolic acidosis and lactic acidosis (pH 7.0, lactate 12.4 uptrending)    Septic and Cardiogenic Shock with Acute hypoxic respiratory failure 2/2 PNA, ?malignancy, left pleural effusion  -c/w above measures per MICU  -cards, pulm, ID all following  -all cx negative thus far  -intubated sedated    Afib RVR: failed cardioversion, cards consulted      Suspected malignancy: CT with LLL masslike opacities suspicious for neoplasm. Nodular left-sided pleural thickening highly suspicious for neoplastic disease. Moderate loculated left pleural effusion. Prominent gastrohepatic and celiac lymph nodes.  -no workup at this time given instability    extremely poor prognosis  DNR  all care per MICU
see below
see below

## 2021-10-19 NOTE — PROGRESS NOTE ADULT - ASSESSMENT
76 y/o F with PMHx of HTN, HLD, ABDOULAYE s/p stent, PVD s/p right AKA 2010, former smoker, depression, hypothyroidism, GERD, hx of MRSA infection, hx of TB chronically ill admitted with severe sepsis with  acute hypoxic respiratory failure with possible lung malignancy with post obstructive pneumonia.  CT chest with Left lower lobe mass suspicious for neoplasm and moderate loculated left pleural effusion. ?malignant effusion + lymphadenopathy.  WBC 15.66. Pt on NRB Agitated and restless.  Had RRT sec worsening respiratory status and hypotension  Seen by ICU       10/18: Doing poorly.  Leukemoid reaction potentially multifactorial in the setting of possible malignancy, and potentially superimposed infection (empyema, postobstructive pneumonia).  The role of minocycline here is unknown.  She has skin changes consistent with chronic use.  This is reversible.  I would clarify its role here, and discontinue it unless there is strong evidence to support its continued use.  In the interim, we can use vancomycin for MRSA coverage.  PCR has been ordered, no result as of yet.  Would dose vancomycin if PCR is positive.  However getting a MRSA pneumonia while on minocycline therapy (presumably suppressive) seems less likely.  This is unlikely to represent Legionella, or other "atypical" bacteria.  Minocycline has activity against Legionella and many of these "atypical" causes of pneumonia.  Once Legionella comes back and is negative, would discontinue azithromycin.  Zosyn is reasonable in the setting.  Her overall status is poor, and worsening.  10/19: MOSF, refractory shock, possible cardiogenic component. Now DNR, appropriate. Odds of recovery at this point appear exceedingly remote.    Suggestions:  Comfort care reasonable here  If not:  IVF  Trend lactate  Pressors  Continue Zosyn  Trend white blood cell count  Follow-up culture data  Monitor renal function/?hd  Trend LFT  Eventual thoracocentesis & ?Bx    Abysmal prognosis.    Dr. Alvaro Hyde to resume care in AM. Please call [phone] if needed.     Scott Ellis MD  Attending Physician  [facility]  Division of Infectious Diseases  [phone]    Mass of lower lobe of left lung [R91.8]    Peripheral vascular disease [I73.9]    Acute respiratory failure with hypoxia [J96.01]    Acute pneumonia [J18.9]    Pleural effusion, left [J90]

## 2021-10-19 NOTE — PROGRESS NOTE ADULT - PROVIDER SPECIALTY LIST ADULT
Hospitalist
Infectious Disease
Infectious Disease
Critical Care
Pulmonology
Critical Care
Pulmonology
Hospitalist
Hospitalist

## 2021-10-19 NOTE — PROGRESS NOTE ADULT - ASSESSMENT
76yo F with PMHx of HTN, HLD, PVD s/p right AKA 2010, former smoker, depression, hypothyroidism, GERD, hx of MRSA infection, hx of TB presented to the ED complaining of weakness and SOB a/w acute hypoxic respiratory failure with left lung mass-like opacities, rule out malignancy with likely evolving septic shock due to suspected aspiration PNA with suspected parapneumonic effusion or empyema course c/b hypotension and mixed metabolic and respiratory acidosis with worsening hypercarbic and hypoxemic respiratory failure requiring mechanical ventilation and ICU level care.

## 2021-10-19 NOTE — PROGRESS NOTE ADULT - SUBJECTIVE AND OBJECTIVE BOX
Date/Time Patient Seen:  		  Referring MD:   Data Reviewed	       Patient is a 75y old  Female who presents with a chief complaint of Weakness, hypoxia (19 Oct 2021 02:07)      Subjective/HPI     PAST MEDICAL & SURGICAL HISTORY:  No pertinent past medical history    Hypertension    Peripheral vascular disease    HLD (hyperlipidemia)    Hypothyroidism    Esophageal reflux    History of depression    History of methicillin resistant staphylococcus aureus (MRSA)    History of tuberculosis    Peripheral vascular disease    S/P AKA (above knee amputation), right    H/O shoulder surgery    History of total hip replacement    H/O: hysterectomy          Medication list         MEDICATIONS  (STANDING):  albuterol/ipratropium for Nebulization 3 milliLiter(s) Nebulizer every 6 hours  allopurinol 100 milliGRAM(s) Oral daily  aspirin enteric coated 81 milliGRAM(s) Oral daily  atorvastatin 10 milliGRAM(s) Oral at bedtime  buDESOnide    Inhalation Suspension 0.5 milliGRAM(s) Inhalation every 12 hours  chlorhexidine 0.12% Liquid 15 milliLiter(s) Oral Mucosa every 12 hours  chlorhexidine 4% Liquid 1 Application(s) Topical <User Schedule>  cholecalciferol 1000 Unit(s) Oral daily  cisatracurium Infusion 3 MICROgram(s)/kG/Min (8.64 mL/Hr) IV Continuous <Continuous>  dextrose 5% + lactated ringers. 1000 milliLiter(s) (100 mL/Hr) IV Continuous <Continuous>  fentaNYL   Infusion. 0.5 MICROgram(s)/kG/Hr (2.4 mL/Hr) IV Continuous <Continuous>  heparin   Injectable 5000 Unit(s) SubCutaneous every 12 hours  hydrocortisone sodium succinate Injectable 100 milliGRAM(s) IV Push every 8 hours  influenza   Vaccine 0.5 milliLiter(s) IntraMuscular once  ketamine Infusion. 0.25 mG/kG/Hr (1.2 mL/Hr) IV Continuous <Continuous>  lactobacillus acidophilus 1 Tablet(s) Oral daily  levothyroxine 100 MICROGram(s) Oral daily  norepinephrine Infusion 0.15 MICROgram(s)/kG/Min (13.5 mL/Hr) IV Continuous <Continuous>  pantoprazole    Tablet 40 milliGRAM(s) Oral before breakfast  phenylephrine    Infusion 0.1 MICROgram(s)/kG/Min (1.8 mL/Hr) IV Continuous <Continuous>  piperacillin/tazobactam IVPB.. 3.375 Gram(s) IV Intermittent every 8 hours  propofol Infusion 10 MICROgram(s)/kG/Min (2.88 mL/Hr) IV Continuous <Continuous>  sertraline 100 milliGRAM(s) Oral daily  vasopressin Infusion 0.04 Unit(s)/Min (2.4 mL/Hr) IV Continuous <Continuous>    MEDICATIONS  (PRN):  acetaminophen   Tablet .. 650 milliGRAM(s) Oral every 6 hours PRN Temp greater or equal to 38C (100.4F), Mild Pain (1 - 3)  aluminum hydroxide/magnesium hydroxide/simethicone Suspension 30 milliLiter(s) Oral every 4 hours PRN Dyspepsia  benzocaine 15 mG/menthol 3.6 mG (Sugar-Free) Lozenge 1 Lozenge Oral two times a day PRN Sore Throat  fentaNYL    Injectable 12.5 MICROGram(s) IV Push once PRN Moderate Pain (4 - 6)  gabapentin 300 milliGRAM(s) Oral once PRN Phantom pain  melatonin 3 milliGRAM(s) Oral at bedtime PRN Insomnia  montelukast 10 milliGRAM(s) Oral daily PRN allergies  ondansetron Injectable 4 milliGRAM(s) IV Push every 8 hours PRN Nausea and/or Vomiting         Vitals log        ICU Vital Signs Last 24 Hrs  T(C): 38 (19 Oct 2021 00:00), Max: 38.7 (18 Oct 2021 20:00)  T(F): 100.4 (19 Oct 2021 00:00), Max: 101.7 (18 Oct 2021 20:00)  HR: 120 (19 Oct 2021 05:11) (108 - 153)  BP: 81/51 (19 Oct 2021 04:00) (69/50 - 129/67)  BP(mean): 61 (19 Oct 2021 04:00) (50 - 96)  ABP: 38/32 (19 Oct 2021 04:00) (38/31 - 89/63)  ABP(mean): 34 (19 Oct 2021 04:00) (34 - 80)  RR: 30 (19 Oct 2021 04:00) (10 - 45)  SpO2: 98% (18 Oct 2021 22:00) (55% - 100%)       Mode: AC/ CMV (Assist Control/ Continuous Mandatory Ventilation)  RR (machine): 30  TV (machine): 400  FiO2: 100  PEEP: 8  ITime: 1  MAP: 19  PIP: 41      Input and Output:  I&O's Detail    17 Oct 2021 07:01  -  18 Oct 2021 07:00  --------------------------------------------------------  IN:    dextrose 5% + sodium chloride 0.45% w/ Additives: 840 mL    IV PiggyBack: 150 mL  Total IN: 990 mL    OUT:    Voided (mL): 250 mL  Total OUT: 250 mL    Total NET: 740 mL      18 Oct 2021 07:01  -  19 Oct 2021 05:39  --------------------------------------------------------  IN:    dextrose 5% + lactated ringers: 1000 mL    dextrose 5% + sodium chloride 0.45% w/ Additives: 210 mL    IV PiggyBack: 225 mL    IV PiggyBack: 250 mL    IV PiggyBack: 250 mL    Lactated Ringers Bolus: 1000 mL    Norepinephrine: 130 mL    Propofol: 82.5 mL  Total IN: 3147.5 mL    OUT:    Indwelling Catheter - Urethral (mL): 220 mL  Total OUT: 220 mL    Total NET: 2927.5 mL          Lab Data                        10.7   38.78 )-----------( 398      ( 19 Oct 2021 02:12 )             37.7     10-19    141  |  113<H>  |  15  ----------------------------<  256<H>  4.5   |  13<L>  |  1.00    Ca    6.9<L>      19 Oct 2021 02:12  Phos  4.4     10-19  Mg     2.1     10-19    TPro  4.1<L>  /  Alb  1.3<L>  /  TBili  0.5  /  DBili  x   /  AST  160<H>  /  ALT  66  /  AlkPhos  168<H>  10-19    ABG - ( 19 Oct 2021 01:18 )  pH, Arterial: 7.17  pH, Blood: x     /  pCO2: 44    /  pO2: 74    / HCO3: 16    / Base Excess: -12.5 /  SaO2: 95.2                    Review of Systems	      Objective     Physical Examination        Pertinent Lab findings & Imaging      Kilo:  NO   Adequate UO     I&O's Detail    17 Oct 2021 07:01  -  18 Oct 2021 07:00  --------------------------------------------------------  IN:    dextrose 5% + sodium chloride 0.45% w/ Additives: 840 mL    IV PiggyBack: 150 mL  Total IN: 990 mL    OUT:    Voided (mL): 250 mL  Total OUT: 250 mL    Total NET: 740 mL      18 Oct 2021 07:01  -  19 Oct 2021 05:39  --------------------------------------------------------  IN:    dextrose 5% + lactated ringers: 1000 mL    dextrose 5% + sodium chloride 0.45% w/ Additives: 210 mL    IV PiggyBack: 225 mL    IV PiggyBack: 250 mL    IV PiggyBack: 250 mL    Lactated Ringers Bolus: 1000 mL    Norepinephrine: 130 mL    Propofol: 82.5 mL  Total IN: 3147.5 mL    OUT:    Indwelling Catheter - Urethral (mL): 220 mL  Total OUT: 220 mL    Total NET: 2927.5 mL               Discussed with:     Cultures:	        Radiology

## 2021-10-19 NOTE — PROVIDER CONTACT NOTE (CRITICAL VALUE NOTIFICATION) - NAME OF MD/NP/PA/DO NOTIFIED:
Cristina SMITH
Dr Sánchez
ccpa sean
Dr. Keny Springer
Cristina SMITH
Zaheer,PA
Cristina SMITH
Dot
Dr. Sotero La
[facility]
Dr La
jennifer dupont
ccpa kamran parekh
tatum mendoza
Dr Moore

## 2021-10-19 NOTE — DISCHARGE NOTE FOR THE EXPIRED PATIENT - HOSPITAL COURSE
Patient admitted for acute hypoxic respiratory failure with left lung mass-like opacities, rule out malignancy +/- superimposed pneumonia. Patient had rapid response called at 1908 on 10/17 for hypoxia and hypotension. Attending found patient to be hypoxic in low 80s on 50% venturi mask. Systolic blood pressure 42, improved to 62 after 1L NS bolus. Started on Bipap and transferred to ICU. Patient with worsening leukocytosis, started on Zosyn and Azithromycin, home minocyclin was discontinued. Blood cultures showed NGTD. MRSA PCR was negative. Urine legionella was negative. Zosyn was continued There was an acute change in mental status with noted hypoxia and hypotension requiring pt to be intubated on 10/18. Bronch was performed. However, clinical status worsened overnight and pt required pressor support with levophed, phenylephrine, milrinone, and vasopressin. Despite this pt's remained hypotensive. Repeat GOC were discussed with pt's family and no further escalation of was decided and pt was made comfortable. time of death was 15:10 on 10/19.

## 2021-10-19 NOTE — PROCEDURE NOTE - NSINFORMCONSENT_GEN_A_CORE
This was an emergent procedure.
Benefits, risks, and possible complications of procedure explained to patient/caregiver who verbalized understanding and gave written consent.
This was an emergent procedure.
This was an emergent procedure.

## 2021-10-19 NOTE — PROCEDURE NOTE - NSINDICATIONS_GEN_A_CORE
critical patient/monitoring purposes
critical illness/venous access/volume resuscitation
hemodynamic instability/SVT
airway protection/critical patient/respiratory failure
critical patient
drainage/feeds

## 2021-10-19 NOTE — PROGRESS NOTE ADULT - PROBLEM SELECTOR PLAN 1
- septic shock likely 2/2 aspiration pneumonia. Now on mechanical vent with pressure support.   - Febrile overnight, with leukocytosis, worsening lactate, BROCK, transaminitis and elevated troponin   - continue zosyn, azithro discontinued as legionella Ag negative  - empiric vancomycin x1 given due to continued decompensation. Pt on minocycline at home, discontinued per ID. MRSA nares PCR negative now, so no further vanco to be given  - BCx NGTD  - S/p bronch with BAL. Bacterial culture with numerous PMNs, but no squamous cells or organisms. Fungal culture pending, will f/u.   - on increasing doses of pressors in the ICU, critically ill ; ICU discussed GOC with family, pt now DNR  - now

## 2021-10-19 NOTE — PROGRESS NOTE ADULT - SUBJECTIVE AND OBJECTIVE BOX
Patient is a 75y old  Female who presents with a chief complaint of weakness, hypoxia (19 Oct 2021 11:24)      INTERVAL HPI/OVERNIGHT EVENTS: Patient seen and examined at bedside, currently sedated. Overnight, pt febrile to 101.7 and 100.4. Pt also had SVT (HR 160s) and hypotension (SBP 50s) while on levophed and vasopressin infusions. Pt was cardioverted w/o success. Phenylephrin infusion was started and pt bolused w/ amiodarone 150 mg. HR improved but patient remained hypotensive. POCUS showed poor LV function suggestive of cardiogenic shock, in addition to septic shock. AB showed worsening metabolic acidosis, but improvement of her respiratory acidosis  and oxygenation (7.17/44/74/16, O2 sat 95%). STAT labs obtained which revealed lactic acidosis (lactate 9.5) and worsening renal function (Cr 0.36 --> 1.00). 1 amp of sodium bicarb administered. Solumedrol switched to solucortef 100mg q8 hrs.     MEDICATIONS  (STANDING):  albumin human 25% IVPB 50 milliLiter(s) IV Intermittent every 6 hours  albuterol/ipratropium for Nebulization 3 milliLiter(s) Nebulizer every 6 hours  allopurinol 100 milliGRAM(s) Oral daily  aspirin enteric coated 81 milliGRAM(s) Oral daily  buDESOnide    Inhalation Suspension 0.5 milliGRAM(s) Inhalation every 12 hours  chlorhexidine 0.12% Liquid 15 milliLiter(s) Oral Mucosa every 12 hours  chlorhexidine 4% Liquid 1 Application(s) Topical <User Schedule>  cholecalciferol 1000 Unit(s) Oral daily  cisatracurium Infusion 3 MICROgram(s)/kG/Min (8.64 mL/Hr) IV Continuous <Continuous>  dextrose 5% + lactated ringers. 1000 milliLiter(s) (100 mL/Hr) IV Continuous <Continuous>  fentaNYL   Infusion. 0.5 MICROgram(s)/kG/Hr (2.4 mL/Hr) IV Continuous <Continuous>  heparin   Injectable 5000 Unit(s) SubCutaneous every 12 hours  hydrocortisone sodium succinate Injectable 100 milliGRAM(s) IV Push every 8 hours  influenza   Vaccine 0.5 milliLiter(s) IntraMuscular once  ketamine Infusion. 0.25 mG/kG/Hr (1.2 mL/Hr) IV Continuous <Continuous>  lactobacillus acidophilus 1 Tablet(s) Oral daily  levothyroxine 100 MICROGram(s) Oral daily  milrinone Infusion 0.125 MICROgram(s)/kG/Min (1.8 mL/Hr) IV Continuous <Continuous>  norepinephrine Infusion 0.15 MICROgram(s)/kG/Min (13.5 mL/Hr) IV Continuous <Continuous>  pantoprazole    Tablet 40 milliGRAM(s) Oral before breakfast  phenylephrine    Infusion 0.1 MICROgram(s)/kG/Min (1.8 mL/Hr) IV Continuous <Continuous>  piperacillin/tazobactam IVPB.. 3.375 Gram(s) IV Intermittent every 12 hours  propofol Infusion 10 MICROgram(s)/kG/Min (2.88 mL/Hr) IV Continuous <Continuous>  sertraline 100 milliGRAM(s) Oral daily  sodium bicarbonate  Infusion 0.234 mEq/kG/Hr (75 mL/Hr) IV Continuous <Continuous>  sodium bicarbonate  Injectable 50 milliEquivalent(s) IV Push once  vasopressin Infusion 0.04 Unit(s)/Min (2.4 mL/Hr) IV Continuous <Continuous>    MEDICATIONS  (PRN):  aluminum hydroxide/magnesium hydroxide/simethicone Suspension 30 milliLiter(s) Oral every 4 hours PRN Dyspepsia  benzocaine 15 mG/menthol 3.6 mG (Sugar-Free) Lozenge 1 Lozenge Oral two times a day PRN Sore Throat  fentaNYL    Injectable 12.5 MICROGram(s) IV Push once PRN Moderate Pain (4 - 6)  gabapentin 300 milliGRAM(s) Oral once PRN Phantom pain  melatonin 3 milliGRAM(s) Oral at bedtime PRN Insomnia  montelukast 10 milliGRAM(s) Oral daily PRN allergies  ondansetron Injectable 4 milliGRAM(s) IV Push every 8 hours PRN Nausea and/or Vomiting      Allergies    No Known Allergies    Intolerances        REVIEW OF SYSTEMS: unable to obtain due to sedation    Vital Signs Last 24 Hrs  T(C): 34.9 (19 Oct 2021 12:30), Max: 38.7 (18 Oct 2021 20:00)  T(F): 94.8 (19 Oct 2021 12:30), Max: 101.7 (18 Oct 2021 20:00)  HR: 123 (19 Oct 2021 12:30) (109 - 153)  BP: 71/49 (19 Oct 2021 12:30) (63/42 - 127/72)  BP(mean): 56 (19 Oct 2021 12:30) (48 - 96)  RR: 30 (19 Oct 2021 12:30) (10 - 39)  SpO2: 59% (19 Oct 2021 12:30) (50% - 100%)    PHYSICAL EXAM:  GENERAL: ill-appearing, on BiPAP   HEENT:  anicteric, moist mucous membranes  CHEST/LUNG:  decreased breath sounds b/l but no wheezes, rales, or rhonchi   HEART:  tachycardic, regular rhythm, S1, S2  ABDOMEN:  BS+, soft, nontender, nondistended; +RLQ hernia  EXTREMITIES: no edema, cyanosis, or calf tenderness; Right AKA; no LLE edema and TTP noted  NERVOUS SYSTEM: lethargic, does not answers questions or follows commands     LABS:                        9.8    36.85 )-----------( 376      ( 19 Oct 2021 06:17 )             34.9     CBC Full  -  ( 19 Oct 2021 06:17 )  WBC Count : 36.85 K/uL  Hemoglobin : 9.8 g/dL  Hematocrit : 34.9 %  Platelet Count - Automated : 376 K/uL  Mean Cell Volume : 93.6 fl  Mean Cell Hemoglobin : 26.3 pg  Mean Cell Hemoglobin Concentration : 28.1 gm/dL  Auto Neutrophil # : 29.13 K/uL  Auto Lymphocyte # : 3.43 K/uL  Auto Monocyte # : 3.22 K/uL  Auto Eosinophil # : 0.00 K/uL  Auto Basophil # : 0.06 K/uL  Auto Neutrophil % : 79.1 %  Auto Lymphocyte % : 9.3 %  Auto Monocyte % : 8.7 %  Auto Eosinophil % : 0.0 %  Auto Basophil % : 0.2 %    19 Oct 2021 06:17    145    |  114    |  17     ----------------------------<  106    4.4     |  11     |  1.10     Ca    6.7        19 Oct 2021 06:17  Phos  6.0       19 Oct 2021 06:17  Mg     2.1       19 Oct 2021 06:17    TPro  3.8    /  Alb  1.2    /  TBili  0.9    /  DBili  x      /  AST  1066   /  ALT  212    /  AlkPhos  161    19 Oct 2021 06:17    PT/INR - ( 19 Oct 2021 06:17 )   PT: 19.8 sec;   INR: 1.74 ratio         PTT - ( 19 Oct 2021 06:17 )  PTT:40.9 sec    CAPILLARY BLOOD GLUCOSE            Culture - Fungal, Bronchial (collected 10-19-21 @ 00:33)  Source: BAL Bronchoalveolar Lavage  Preliminary Report (10-19-21 @ 06:49):    Testing in progress    Culture - Bronchial (collected 10-19-21 @ 00:33)  Source: Bronch Wash Bronchoalveolar Lavage  Gram Stain (10-19-21 @ 04:22):    Numerous polymorphonuclear leukocytes per low power field    No squamous epithelial cells per low power field    No organisms seen per oil power field    Culture - Blood (collected 10-16-21 @ 21:07)  Source: .Blood Blood-Venous  Preliminary Report (10-17-21 @ 22:01):    No growth to date.    Culture - Blood (collected 10-16-21 @ 21:07)  Source: .Blood Blood-Venous  Preliminary Report (10-17-21 @ 22:01):    No growth to date.        RADIOLOGY & ADDITIONAL TESTS:    Personally reviewed.     Consultant(s) Notes Reviewed:  [x] YES  [ ] NO

## 2021-10-19 NOTE — PROGRESS NOTE ADULT - PROBLEM SELECTOR PLAN 3
- CT Chest: Groundglass opacities in the right lung. There is left lower lobe opacity measuring 3.8 x 2.8 cm. Emphysema. There is also a 2.3 x 3.3 cm left lower lobe medial opacity abutting the pericardium. Moderate loculated left pleural effusion. Nodular left-sided pleural thickening is noted. Trace right pleural effusion.  - plan as above  - S/p bronch with BAL. Bacterial culture with numerous PMNs, but no squamous cells or organisms. Fungal culture pending, will f/u.   - work-up further as pt stabilizes from likely overlying sepsis / PNA

## 2021-10-19 NOTE — CONSULT NOTE ADULT - ATTENDING COMMENTS
severe multifactorial shock  lv fxn was normal 10/18 and with abrupt clinical deterioration, her ef decreased  agree with the addition of milrinone to her regimen  cont her other pressors and wean as tolerated  a line position changed, but her arterial pressure is poor regardless of position  family now have decided to withdraw care, which is appropriate given the clinical course    Upon my evaluation, this patient is at high risk for imminent or life threatening deterioration due to resp failure, hypotension,  and other active medical issues which require my direct attention, intervention, and personal management.  I have personally spent >75 discontinuous minutes  of critical care time exclusive of time spent on separate billing procedures. This includes review of laboratory data, radiology results, discussion with primary team\patient, and monitoring for potential decompensation Interventions were performed as documented above.

## 2021-10-19 NOTE — PROGRESS NOTE ADULT - PROBLEM SELECTOR PLAN 11
# DVT ppx: Lovenox 40 subq daily, can hold for procedure       #Dysphagia   - S/S consulted, recs appreciated   - Diet: mechanical soft
# DVT ppx: Lovenox 40 subq daily, can hold for procedure       #Dysphagis   - S/S consulted, will appreciate recs   - Diet: mechanical soft
# DVT ppx: Lovenox 40 subq daily, can hold for procedure       #Dysphagia   - S/S consulted, recs appreciated   - Diet: mechanical soft

## 2021-10-19 NOTE — CONSULT NOTE ADULT - ASSESSMENT
74 y/o F with PMHx of HTN, HLD, ABDOULAYE s/p stent, PVD s/p right AKA 2010, former smoker, depression, hypothyroidism, GERD, hx of MRSA infection, hx of TB presented to the ED complaining of weakness.        *****CHARTING IN PROGRESS*******        76 y/o F with PMHx of HTN, HLD, ABDOULAYE s/p stent, PVD s/p right AKA 2010, former smoker, depression, hypothyroidism, GERD, hx of MRSA infection, hx of TB presented to the ED complaining of weakness. Patient admitted for weakness and SOB with acute hypoxic respiratory failure with left lung mass. RRT called for hypotension and hypoxia and pt transferred to ICU on bipap. Respiratory status continued to worsen requiring intubation, sedation and paralytics. Septic shock 2/2 pneumonia.         *****CHARTING IN PROGRESS*******       Cardiogenic shock   - Overnight pt noted to be hypotensive and hypoxic. Sedation and pressors added however pt went into SVT requiring cardioversion, which was unfortunately unsuccessful Pt then started on phenylephrine gtt and amiodarone.   - Remains tachycardic, tachypneic, hypothermic, hypotensive and hypoxic  - A line now placed for more accurate vital readings   -      74 y/o F with PMHx of HTN, HLD, ABDOULAYE s/p stent, PVD s/p right AKA 2010, former smoker, depression, hypothyroidism, GERD, hx of MRSA infection, hx of TB presented to the ED complaining of weakness. Patient admitted for weakness and SOB with acute hypoxic respiratory failure with left lung mass. RRT called for hypotension and hypoxia and pt transferred to ICU on bipap. Respiratory status continued to worsen requiring intubation, sedation and paralytics. Septic shock 2/2 pneumonia.         *****CHARTING IN PROGRESS*******       Cardiogenic shock   - Overnight pt noted to be hypotensive and hypoxic. Sedation and pressors added however pt went into SVT requiring cardioversion, which was unfortunately unsuccessful Pt then started on phenylephrine gtt and amiodarone.   - LV function normal noted on bedside US on 10/18/21  - Remains tachycardic, tachypneic, hypothermic, hypotensive and hypoxic  - Left femoral A line now placed for more accurate vital readings   - Troponins elevated showing demand ischemia in cardiogenic shock   - Started on milrinone gtt   - Agree with pressors vasopressin, phenylephrine.      76 y/o F with PMHx of HTN, HLD, ABDOULAYE s/p stent, PVD s/p right AKA 2010, former smoker, depression, hypothyroidism, GERD, hx of MRSA infection, hx of TB presented to the ED complaining of weakness. Patient admitted for weakness and SOB with acute hypoxic respiratory failure with left lung mass suspected to be malignancy. RRT called for hypotension and hypoxia and pt transferred to ICU on bipap. Respiratory status continued to worsen requiring intubation, sedation and paralytics for respiratory failure 2/2 respiratory acidosis. Septic shock 2/2 pneumonia. Cardiology consulted for cardiogenic shock.    Cardiogenic shock   - Overnight pt noted to be hypotensive and hypoxic. Sedation and pressors added however pt went into SVT requiring cardioversion, which was unfortunately unsuccessful Pt then started on phenylephrine gtt and amiodarone.   - LV function normal noted on bedside US on 10/18/21 however now worsening today  - Remains tachycardic, tachypneic, hypothermic, hypotensive and hypoxic  - Left femoral A line now placed for more accurate vital readings and better IV access given extensive PVD   - Troponins elevated showing demand ischemia in cardiogenic shock, unlikely acute plaque rupture. Will continue to trend. Prognosis poor given significant other comorbidities, not ideal candidate for revascularization.   - No meaningful evidence of volume overload. No diuresis recommended at this time   - Started on milrinone gtt for inotropic support   - Agree with pressors vasopressin, phenylephrine, levophed.   - Monitor and replete lytes, keep K>4, Mg>2    HTN  - As per above    CAD   - S/p stent  - Hold statin the setting of transaminitis     PVD  - S/p right AKA in former smoker  - Continue asa     - Other cardiovascular workup will depend on clinical course.  - All other workup per primary team.  - Will continue to follow.                 76 y/o F with PMHx of HTN, HLD, ABDOULAYE s/p stent, PVD s/p right AKA 2010, former smoker, depression, hypothyroidism, GERD, hx of MRSA infection, hx of TB presented to the ED complaining of weakness. Patient admitted for weakness and SOB with acute hypoxic respiratory failure with left lung mass suspected to be malignancy. RRT called for hypotension and hypoxia and pt transferred to ICU on bipap. Respiratory status continued to worsen requiring intubation, sedation and paralytics for respiratory failure 2/2 respiratory acidosis. Septic shock 2/2 pneumonia. Cardiology consulted for cardiogenic shock.    Cardiogenic shock   - Overnight pt noted to be hypotensive and hypoxic. Sedation and pressors added however pt went into SVT requiring cardioversion, which was unfortunately unsuccessful Pt then started on phenylephrine gtt and amiodarone.   - LV function normal noted on bedside US on 10/18/21 however now worsening today  - Remains tachycardic, tachypneic, hypothermic, hypotensive and hypoxic  - Left femoral A line now placed for more accurate vital readings and better IV access given extensive PVD   - Troponins elevated showing demand ischemia in cardiogenic shock, unlikely acute plaque rupture. Will continue to trend. Prognosis poor given significant other comorbidities, not a candidate for revascularization.   - No meaningful evidence of volume overload. No diuresis recommended at this time   - Started on milrinone gtt for inotropic support noting acute deterioration in lv fxn, which may itself be from sepsis, ischemia, stress-mediated  - Agree with pressors vasopressin, phenylephrine, levophed.   - Monitor and replete lytes, keep K>4, Mg>2    HTN  - As per above    CAD   - S/p stent  - Hold statin the setting of transaminitis     PVD  - S/p right AKA in former smoker  - Continue asa     - Other cardiovascular workup will depend on clinical course.  - All other workup per primary team.  - Will continue to follow.

## 2021-10-19 NOTE — PROGRESS NOTE ADULT - PROBLEM SELECTOR PLAN 6
Chronic, stable   - C/w asa + lipitor   - C/w gabapentin PRN for phantom limb pain  - Holding home nifedipine for hypotension

## 2021-10-19 NOTE — PROGRESS NOTE ADULT - SUBJECTIVE AND OBJECTIVE BOX
IN PROGRESS    Patient is a 75y old  Female who presents with a chief complaint of weakness, hypoxia (19 Oct 2021 05:38)    24 hour events: ***    REVIEW OF SYSTEMS  Constitutional: No fever, chills, fatigue  Neuro: No headache, numbness, weakness  Resp: No cough, wheezing, shortness of breath  CVS: No chest pain, palpitations, leg swelling  GI: No abdominal pain, nausea, vomiting, diarrhea   : No dysuria, frequency, incontinence  Skin: No itching, burning, rashes, or lesions   Msk: No joint pain or swelling  Psych: No depression, anxiety, mood swings  Heme: No bleeding    T(F): 96.8 (10-19-21 @ 07:24), Max: 101.7 (10-18-21 @ 20:00)  HR: 110 (10-19-21 @ 08:30) (110 - 153)  BP: 73/52 (10-19-21 @ 08:30) (63/42 - 129/67)  RR: 30 (10-19-21 @ 08:30) (10 - 39)  SpO2: 52% (10-19-21 @ 08:00) (50% - 100%)  Wt(kg): --    Mode: AC/ CMV (Assist Control/ Continuous Mandatory Ventilation), RR (machine): 30, TV (machine): 400, FiO2: 100, PEEP: 8        I&O's Summary    10-18 @ 07:01  -  10-19 @ 07:00  --------------------------------------------------------  IN: 4400.9 mL / OUT: 250 mL / NET: 4150.9 mL      PHYSICAL EXAM  General:   CNS:   HEENT:   Resp:   CVS:   Abd:   Ext:   Skin:     MEDICATIONS  piperacillin/tazobactam IVPB.. IV Intermittent    milrinone Infusion IV Continuous  norepinephrine Infusion IV Continuous  phenylephrine    Infusion IV Continuous    allopurinol Oral  atorvastatin Oral  hydrocortisone sodium succinate Injectable IV Push  levothyroxine Oral  vasopressin Infusion IV Continuous    albuterol/ipratropium for Nebulization Nebulizer  buDESOnide    Inhalation Suspension Inhalation  montelukast Oral PRN    acetaminophen   Tablet .. Oral PRN  cisatracurium Infusion IV Continuous  fentaNYL    Injectable IV Push PRN  fentaNYL   Infusion. IV Continuous  gabapentin Oral PRN  ketamine Infusion. IV Continuous  melatonin Oral PRN  ondansetron Injectable IV Push PRN  propofol Infusion IV Continuous  sertraline Oral      aspirin enteric coated Oral  heparin   Injectable SubCutaneous    aluminum hydroxide/magnesium hydroxide/simethicone Suspension Oral PRN  pantoprazole    Tablet Oral      albumin human  5% IVPB IV Intermittent  albumin human 25% IVPB IV Intermittent  cholecalciferol Oral  dextrose 5% + lactated ringers. IV Continuous  lactated ringers Bolus IV Bolus  sodium bicarbonate  Infusion IV Continuous  sodium bicarbonate  Injectable IV Push    influenza   Vaccine IntraMuscular    benzocaine 15 mG/menthol 3.6 mG (Sugar-Free) Lozenge Oral PRN  chlorhexidine 0.12% Liquid Oral Mucosa  chlorhexidine 4% Liquid Topical    lactobacillus acidophilus Oral                          9.8    36.85 )-----------( 376      ( 19 Oct 2021 06:17 )             34.9     Bands 3.0    10-19    145  |  114<H>  |  17  ----------------------------<  106<H>  4.4   |  11<L>  |  1.10    Ca    6.7<L>      19 Oct 2021 06:17  Phos  6.0     10-19  Mg     2.1     10-19    TPro  3.8<L>  /  Alb  1.2<L>  /  TBili  0.9  /  DBili  x   /  AST  1066<H>  /  ALT  212<H>  /  AlkPhos  161<H>  10-19    Lactate 9.5           10-19 @ 02:12    Lactate 3.9           10-18 @ 18:15      CARDIAC MARKERS ( 19 Oct 2021 06:17 )  5.340 ng/mL / x     / 225 U/L / x     / 18.1 ng/mL      PT/INR - ( 19 Oct 2021 06:17 )   PT: 19.8 sec;   INR: 1.74 ratio         PTT - ( 19 Oct 2021 06:17 )  PTT:40.9 sec    Bronch Wash Bronchoalveolar Lavage   Testing in progress   Numerous polymorphonuclear leukocytes per low power field  No squamous epithelial cells per low power field  No organisms seen per oil power field 10-19 @ 00:33  .Blood Blood-Venous   No growth to date. -- 10-16 @ 21:07      Rapid RVP Result: NotDetec (10-16 @ 16:14)    Radiology: ***  Bedside lung ultrasound: ***  Bedside ECHO: ***    CENTRAL LINE: Y/N          DATE INSERTED:              REMOVE: Y/N  GOLDEN: Y/N                        DATE INSERTED:              REMOVE: Y/N  A-LINE: Y/N                       DATE INSERTED:              REMOVE: Y/N    GLOBAL ISSUE/BEST PRACTICE  Analgesia:   Sedation:   CAM-ICU:   HOB elevation: yes  Stress ulcer prophylaxis:   VTE prophylaxis:   Glycemic control:   Nutrition:     CODE STATUS: ***  [facility] discussion: Y       IN PROGRESS    Patient is a 75y old  Female who presents with a chief complaint of weakness, hypoxia (19 Oct 2021 05:38)    24 hour events: Patient with persistent hypotension and hypoxemia with disagreement between axillary arterial line and automated BP cuff. Patient currently sedated on propofol breathing on vent with RR 34. However, examination of vent waveforms shows significant autoPEEP at that rate given very slow uptake on expiratory limb (c/w obstructive ventilatory defect), peaks in the mid-40s, and evidence of attempts at abdominal breathing. Patient paralyzed with Nimbex to reduce abdominal efforts, increased sedation with ketamine and fentanyl (for analgesia and possible bronchodilation). Attempted to wean propofol to help hypotension. Started on Levophed and vasopressin.    Later in the night, pt had SVT (HR 160s) w/ subsequent development of hypotension (SBP 50s) despite high dose levophed and vasopressin infusions. Pt was cardioverted w/ 200 J w/o success. Phenylephrine infusion started, also bolused w/ amiodarone 150mg. Heart rate improved s/p amiodarone, however, she remained profoundly hypotensive on three vasopressors. Bedside POCUS revealed poor LV function indicating development of cardiogenic shock state on top of septic shock. ABG prior to this event revealed a worsening metabolic acidosis, but improvement in respiratory acidosis and oxygenation (7.17/44/74/16, O2 sat 95%). STAT labs obtained which revealed lactic acidosis (lactate 9.5) and worsening renal function (Cr 0.36 --> 1.00). 1 amp of sodium bicarb administered. Solumedrol switched to solucortef 100mg q8 hrs.     Care team reached out to pt's family, Dylon () and Mandy (daughter) - DNR order placed.     Overnight patient was febrile at 101.7 and 100.4, but is now 96.8.      REVIEW OF SYSTEMS  Constitutional: No fever, chills, fatigue  Neuro: No headache, numbness, weakness  Resp: No cough, wheezing, shortness of breath  CVS: No chest pain, palpitations, leg swelling  GI: No abdominal pain, nausea, vomiting, diarrhea   : No dysuria, frequency, incontinence  Skin: No itching, burning, rashes, or lesions   Msk: No joint pain or swelling  Psych: No depression, anxiety, mood swings  Heme: No bleeding    T(F): 96.8 (10-19-21 @ 07:24), Max: 101.7 (10-18-21 @ 20:00)  HR: 110 (10-19-21 @ 08:30) (110 - 153)  BP: 73/52 (10-19-21 @ 08:30) (63/42 - 129/67)  RR: 30 (10-19-21 @ 08:30) (10 - 39)  SpO2: 52% (10-19-21 @ 08:00) (50% - 100%)  Wt(kg): --    Mode: AC/ CMV (Assist Control/ Continuous Mandatory Ventilation), RR (machine): 30, TV (machine): 400, FiO2: 100, PEEP: 8        I&O's Summary    10-18 @ 07:01  -  10-19 @ 07:00  --------------------------------------------------------  IN: 4400.9 mL / OUT: 250 mL / NET: 4150.9 mL      PHYSICAL EXAM  General:   CNS:   HEENT:   Resp:   CVS:   Abd:   Ext:   Skin:     MEDICATIONS  piperacillin/tazobactam IVPB.. IV Intermittent    milrinone Infusion IV Continuous  norepinephrine Infusion IV Continuous  phenylephrine    Infusion IV Continuous    allopurinol Oral  atorvastatin Oral  hydrocortisone sodium succinate Injectable IV Push  levothyroxine Oral  vasopressin Infusion IV Continuous    albuterol/ipratropium for Nebulization Nebulizer  buDESOnide    Inhalation Suspension Inhalation  montelukast Oral PRN    acetaminophen   Tablet .. Oral PRN  cisatracurium Infusion IV Continuous  fentaNYL    Injectable IV Push PRN  fentaNYL   Infusion. IV Continuous  gabapentin Oral PRN  ketamine Infusion. IV Continuous  melatonin Oral PRN  ondansetron Injectable IV Push PRN  propofol Infusion IV Continuous  sertraline Oral      aspirin enteric coated Oral  heparin   Injectable SubCutaneous    aluminum hydroxide/magnesium hydroxide/simethicone Suspension Oral PRN  pantoprazole    Tablet Oral      albumin human  5% IVPB IV Intermittent  albumin human 25% IVPB IV Intermittent  cholecalciferol Oral  dextrose 5% + lactated ringers. IV Continuous  lactated ringers Bolus IV Bolus  sodium bicarbonate  Infusion IV Continuous  sodium bicarbonate  Injectable IV Push    influenza   Vaccine IntraMuscular    benzocaine 15 mG/menthol 3.6 mG (Sugar-Free) Lozenge Oral PRN  chlorhexidine 0.12% Liquid Oral Mucosa  chlorhexidine 4% Liquid Topical    lactobacillus acidophilus Oral                          9.8    36.85 )-----------( 376      ( 19 Oct 2021 06:17 )             34.9     Bands 3.0    10-19    145  |  114<H>  |  17  ----------------------------<  106<H>  4.4   |  11<L>  |  1.10    Ca    6.7<L>      19 Oct 2021 06:17  Phos  6.0     10-19  Mg     2.1     10-19    TPro  3.8<L>  /  Alb  1.2<L>  /  TBili  0.9  /  DBili  x   /  AST  1066<H>  /  ALT  212<H>  /  AlkPhos  161<H>  10-19    Lactate 9.5           10-19 @ 02:12    Lactate 3.9           10-18 @ 18:15      CARDIAC MARKERS ( 19 Oct 2021 06:17 )  5.340 ng/mL / x     / 225 U/L / x     / 18.1 ng/mL      PT/INR - ( 19 Oct 2021 06:17 )   PT: 19.8 sec;   INR: 1.74 ratio         PTT - ( 19 Oct 2021 06:17 )  PTT:40.9 sec    Bronch Wash Bronchoalveolar Lavage   Testing in progress   Numerous polymorphonuclear leukocytes per low power field  No squamous epithelial cells per low power field  No organisms seen per oil power field 10-19 @ 00:33  .Blood Blood-Venous   No growth to date. -- 10-16 @ 21:07      Rapid RVP Result: NotDetec (10-16 @ 16:14)    Radiology: ***  Bedside lung ultrasound: ***  Bedside ECHO: ***    CENTRAL LINE: Y/N          DATE INSERTED:              REMOVE: Y/N  GOLDEN: Y/N                        DATE INSERTED:              REMOVE: Y/N  A-LINE: Y/N                       DATE INSERTED:              REMOVE: Y/N    GLOBAL ISSUE/BEST PRACTICE  Analgesia:   Sedation:   CAM-ICU:   HOB elevation: yes  Stress ulcer prophylaxis:   VTE prophylaxis:   Glycemic control:   Nutrition:     CODE STATUS: ***  [facility] discussion: Y       Patient is a 75y old  Female who presents with a chief complaint of weakness, hypoxia (19 Oct 2021 05:38)    24 hour events: Overnight pt was febrile, had SVT (HR 160s) w/ subsequent development of hypotension (SBP 50s) despite high dose levophed and vasopressin infusions. Pt was cardioverted w/ 200 J w/o success. Phenylephrine infusion started, also bolused w/ amiodarone 150mg. Heart rate improved s/p amiodarone, however, she remained profoundly hypotensive on three vasopressors. Bedside POCUS revealed poor LV function indicating development of cardiogenic shock state on top of septic shock. ABG prior to this event revealed a worsening metabolic acidosis, but improvement in respiratory acidosis and oxygenation (7.17/44/74/16, O2 sat 95%). STAT labs obtained which revealed lactic acidosis (lactate 9.5) and worsening renal function (Cr 0.36 --> 1.00). 1 amp of sodium bicarb administered. Solumedrol switched to solucortef 100mg q8 hrs.    Patient with persistent hypotension and hypoxemia. A-Line was placed overnight however, disagreement between axillary arterial l and automated BP cuff. A-line was placed ago today which showed persistent hypotension. Patient was sedated on propofol breathing on vent with RR 34. However, examination of vent waveforms showed significant autoPEEP at that rate given very slow uptake on expiratory limb (c/w obstructive ventilatory defect), peaks in the mid-40s, and evidence of attempts at abdominal breathing. Patient paralyzed with Nimbex to reduce abdominal efforts, increased sedation with ketamine and fentanyl (for analgesia and possible bronchodilation). Attempted to wean propofol to help hypotension. Started on Levophed, phenylephrine, milrinone, and vasopressin.         Care team reached out to pt's family, Dylon () and Mandy (daughter) - DNR order placed and unlimately decsion was made to not escalate level of care and make patient comfortable.      REVIEW OF SYSTEMS  unable to obtain 2/2 clinical status     T(F): 96.8 (10-19-21 @ 07:24), Max: 101.7 (10-18-21 @ 20:00)  HR: 110 (10-19-21 @ 08:30) (110 - 153)  BP: 73/52 (10-19-21 @ 08:30) (63/42 - 129/67)  RR: 30 (10-19-21 @ 08:30) (10 - 39)  SpO2: 52% (10-19-21 @ 08:00) (50% - 100%)  Wt(kg): --    Mode: AC/ CMV (Assist Control/ Continuous Mandatory Ventilation), RR (machine): 30, TV (machine): 400, FiO2: 100, PEEP: 8        I&O's Summary    10-18 @ 07:01  -  10-19 @ 07:00  --------------------------------------------------------  IN: 4400.9 mL / OUT: 250 mL / NET: 4150.9 mL      PHYSICAL EXAM  General: sedated   CNS: sedated, pin point pupils  HEENT: NCAT, dry mucous membranes  Resp: decreased bs b/l; intubated  CVS: tachy, +S1+S2  Abd: +BS, soft, NT, ND  Ext: AKA RLE, no cyanosis or clubbing  Skin: dark/black skin changes on b/l LE and face    MEDICATIONS  piperacillin/tazobactam IVPB.. IV Intermittent    milrinone Infusion IV Continuous  norepinephrine Infusion IV Continuous  phenylephrine    Infusion IV Continuous    allopurinol Oral  atorvastatin Oral  hydrocortisone sodium succinate Injectable IV Push  levothyroxine Oral  vasopressin Infusion IV Continuous    albuterol/ipratropium for Nebulization Nebulizer  buDESOnide    Inhalation Suspension Inhalation  montelukast Oral PRN    acetaminophen   Tablet .. Oral PRN  cisatracurium Infusion IV Continuous  fentaNYL    Injectable IV Push PRN  fentaNYL   Infusion. IV Continuous  gabapentin Oral PRN  ketamine Infusion. IV Continuous  melatonin Oral PRN  ondansetron Injectable IV Push PRN  propofol Infusion IV Continuous  sertraline Oral      aspirin enteric coated Oral  heparin   Injectable SubCutaneous    aluminum hydroxide/magnesium hydroxide/simethicone Suspension Oral PRN  pantoprazole    Tablet Oral      albumin human  5% IVPB IV Intermittent  albumin human 25% IVPB IV Intermittent  cholecalciferol Oral  dextrose 5% + lactated ringers. IV Continuous  lactated ringers Bolus IV Bolus  sodium bicarbonate  Infusion IV Continuous  sodium bicarbonate  Injectable IV Push    influenza   Vaccine IntraMuscular    benzocaine 15 mG/menthol 3.6 mG (Sugar-Free) Lozenge Oral PRN  chlorhexidine 0.12% Liquid Oral Mucosa  chlorhexidine 4% Liquid Topical    lactobacillus acidophilus Oral                          9.8    36.85 )-----------( 376      ( 19 Oct 2021 06:17 )             34.9     Bands 3.0    10-19    145  |  114<H>  |  17  ----------------------------<  106<H>  4.4   |  11<L>  |  1.10    Ca    6.7<L>      19 Oct 2021 06:17  Phos  6.0     10-19  Mg     2.1     10-19    TPro  3.8<L>  /  Alb  1.2<L>  /  TBili  0.9  /  DBili  x   /  AST  1066<H>  /  ALT  212<H>  /  AlkPhos  161<H>  10-19    Lactate 9.5           10-19 @ 02:12    Lactate 3.9           10-18 @ 18:15      CARDIAC MARKERS ( 19 Oct 2021 06:17 )  5.340 ng/mL / x     / 225 U/L / x     / 18.1 ng/mL      PT/INR - ( 19 Oct 2021 06:17 )   PT: 19.8 sec;   INR: 1.74 ratio         PTT - ( 19 Oct 2021 06:17 )  PTT:40.9 sec    Bronch Wash Bronchoalveolar Lavage   Testing in progress   Numerous polymorphonuclear leukocytes per low power field  No squamous epithelial cells per low power field  No organisms seen per oil power field 10-19 @ 00:33  .Blood Blood-Venous   No growth to date. -- 10-16 @ 21:07      Rapid RVP Result: NotDetec (10-16 @ 16:14)    CENTRAL LINE: N  GOLDEN: Y                        DATE INSERTED: 10/18/21            REMOVE: N  A-LINE: N                    GLOBAL ISSUE/BEST PRACTICE  Analgesia: yes  Sedation: yes  CAM-ICU   HOB elevation: yes  Stress ulcer prophylaxis: yes  VTE prophylaxis: yes  Glycemic control: yes  Nutrition: NGT    CODE STATUS: DNR  [facility] discussion: Y

## 2021-10-19 NOTE — PROCEDURE NOTE - ADDITIONAL PROCEDURE DETAILS
Dx: acute hypoxic/hypercapnic respiratory failure, shock, SVT, lobar pneumonia    Procedure performed independent of CCT.
Dx: acute hypoxic/hypercapnic respiratory failure, shock, lobar pneumonia    Procedure performed independent of CCT.

## 2021-10-19 NOTE — PROGRESS NOTE ADULT - ATTENDING SUPERVISION STATEMENT
Med rec updated and complete.  Allergies reviewed . Pt denies oral antibiotic use in last   30 days. Pt has not been able to take any of her medications for three days.  
Resident

## 2021-10-20 LAB
CULTURE RESULTS: SIGNIFICANT CHANGE UP
SPECIMEN SOURCE: SIGNIFICANT CHANGE UP

## 2021-10-21 LAB
CULTURE RESULTS: SIGNIFICANT CHANGE UP
CULTURE RESULTS: SIGNIFICANT CHANGE UP
SPECIMEN SOURCE: SIGNIFICANT CHANGE UP
SPECIMEN SOURCE: SIGNIFICANT CHANGE UP

## 2021-11-16 LAB
CULTURE RESULTS: SIGNIFICANT CHANGE UP
SPECIMEN SOURCE: SIGNIFICANT CHANGE UP

## 2022-04-06 NOTE — ED PROVIDER NOTE - SOCIAL CONCERNS
None Cosentyx Counseling:  I discussed with the patient the risks of Cosentyx including but not limited to worsening of Crohn's disease, immunosuppression, allergic reactions and infections.  The patient understands that monitoring is required including a PPD at baseline and must alert us or the primary physician if symptoms of infection or other concerning signs are noted.

## 2023-02-16 NOTE — CONSULT NOTE ADULT - SUBJECTIVE AND OBJECTIVE BOX
Patient is a 75y old  Female who presents with a chief complaint of weakness, hypoxia (19 Oct 2021 10:18)      HPI:  76 y/o F with PMHx of HTN, HLD, ABDOULAYE s/p stent, PVD s/p right AKA , former smoker, depression, hypothyroidism, GERD, hx of MRSA infection, hx of TB presented to the ED complaining of weakness. Brought in by EMS and was noted to have a room air O2 sat of 88%. Patient states that for the past 3-4 weeks, she has been feeling weaker than usual, has had less of an appetite, and has been vomiting. Patient also states she has been having increased difficulty w/ breathing, primarily when she attempts to move around. As per patient, she came to the ED today because her symptoms were not resolving on their own. Patient lives at home with her  and ambulates with a walker. She has at least a 30 pack year history of smoking, quit in . She stopped her aspirin 1 week ago on her own due to GI symptoms. Her mother  at 47 years old in [place] from an unknown cause. Her brother  at 44 years old from a heart attack. As per patient's daughter, patient has no known family history of cancer. Does note that her leg has become more swollen. Her ashen skin complexion has been the same for the past 11 years.    In the ED,  Vital Signs Last 24 Hrs T(F)Max: 99.1 HR: 82-94 BP: 79/45-88/57 RR: 18 SpO2: 91%  Labs significant for: WBC 15.66, Alk Phos 211, AST 67, PCT 3.85, Lactate 2.5, proBNP 613  CXR: Left sided infiltrate. Bilateral patchiness.  CT CAP: Ground Glass Opacities in right lung. Left lower lobe masslike opacities suspicious for neoplasm. Nodular left-sided pleural thickening highly suspicious for neoplastic disease. Moderate loculated left pleural effusion. Prominent gastrohepatic and celiac lymph nodes. Large right lower quadrant hernia extending into the inguinal canal.  EKG: NSR at 90bpm (16 Oct 2021 19:02)        PAST MEDICAL & SURGICAL HISTORY:  Hypertension    Peripheral vascular disease    HLD (hyperlipidemia)    Hypothyroidism    Esophageal reflux    History of depression    History of methicillin resistant staphylococcus aureus (MRSA)    History of tuberculosis    Peripheral vascular disease    S/P AKA (above knee amputation), right    H/O shoulder surgery    History of total hip replacement    H/O: hysterectomy        ECHO FINDINGS: no recent echo on EMR     MEDICATIONS  (STANDING):  albumin human 25% IVPB 50 milliLiter(s) IV Intermittent every 6 hours  albuterol/ipratropium for Nebulization 3 milliLiter(s) Nebulizer every 6 hours  allopurinol 100 milliGRAM(s) Oral daily  aspirin enteric coated 81 milliGRAM(s) Oral daily  atorvastatin 10 milliGRAM(s) Oral at bedtime  buDESOnide    Inhalation Suspension 0.5 milliGRAM(s) Inhalation every 12 hours  chlorhexidine 0.12% Liquid 15 milliLiter(s) Oral Mucosa every 12 hours  chlorhexidine 4% Liquid 1 Application(s) Topical <User Schedule>  cholecalciferol 1000 Unit(s) Oral daily  cisatracurium Infusion 3 MICROgram(s)/kG/Min (8.64 mL/Hr) IV Continuous <Continuous>  dextrose 5% + lactated ringers. 1000 milliLiter(s) (100 mL/Hr) IV Continuous <Continuous>  fentaNYL   Infusion. 0.5 MICROgram(s)/kG/Hr (2.4 mL/Hr) IV Continuous <Continuous>  heparin   Injectable 5000 Unit(s) SubCutaneous every 12 hours  hydrocortisone sodium succinate Injectable 100 milliGRAM(s) IV Push every 8 hours  influenza   Vaccine 0.5 milliLiter(s) IntraMuscular once  ketamine Infusion. 0.25 mG/kG/Hr (1.2 mL/Hr) IV Continuous <Continuous>  lactobacillus acidophilus 1 Tablet(s) Oral daily  levothyroxine 100 MICROGram(s) Oral daily  milrinone Infusion 0.125 MICROgram(s)/kG/Min (1.8 mL/Hr) IV Continuous <Continuous>  norepinephrine Infusion 0.15 MICROgram(s)/kG/Min (13.5 mL/Hr) IV Continuous <Continuous>  pantoprazole    Tablet 40 milliGRAM(s) Oral before breakfast  phenylephrine    Infusion 0.1 MICROgram(s)/kG/Min (1.8 mL/Hr) IV Continuous <Continuous>  piperacillin/tazobactam IVPB.. 3.375 Gram(s) IV Intermittent every 12 hours  propofol Infusion 10 MICROgram(s)/kG/Min (2.88 mL/Hr) IV Continuous <Continuous>  sertraline 100 milliGRAM(s) Oral daily  sodium bicarbonate  Infusion 0.234 mEq/kG/Hr (75 mL/Hr) IV Continuous <Continuous>  sodium bicarbonate  Injectable 50 milliEquivalent(s) IV Push once  vasopressin Infusion 0.04 Unit(s)/Min (2.4 mL/Hr) IV Continuous <Continuous>    MEDICATIONS  (PRN):  acetaminophen   Tablet .. 650 milliGRAM(s) Oral every 6 hours PRN Temp greater or equal to 38C (100.4F), Mild Pain (1 - 3)  aluminum hydroxide/magnesium hydroxide/simethicone Suspension 30 milliLiter(s) Oral every 4 hours PRN Dyspepsia  benzocaine 15 mG/menthol 3.6 mG (Sugar-Free) Lozenge 1 Lozenge Oral two times a day PRN Sore Throat  fentaNYL    Injectable 12.5 MICROGram(s) IV Push once PRN Moderate Pain (4 - 6)  gabapentin 300 milliGRAM(s) Oral once PRN Phantom pain  melatonin 3 milliGRAM(s) Oral at bedtime PRN Insomnia  montelukast 10 milliGRAM(s) Oral daily PRN allergies  ondansetron Injectable 4 milliGRAM(s) IV Push every 8 hours PRN Nausea and/or Vomiting      FAMILY HISTORY:  Family history of MI (myocardial infarction) (Sibling)  In brother, brother passed away at 43 yo    Family history of valvular heart disease (Sibling)  In sister    Family history of early death (Sibling, Mother)  Brother at 45yo from MI, mother at 46 yo unknown cause    Denies Family history of CAD or early MI    Unable to assess ROS given pt sedated and intubated.     SOCIAL HISTORY: No tobacco, Alcohol or Drug use    Vital Signs Last 24 Hrs  T(C): 35.1 (19 Oct 2021 10:00), Max: 38.7 (18 Oct 2021 20:00)  T(F): 95.2 (19 Oct 2021 10:00), Max: 101.7 (18 Oct 2021 20:00)  HR: 121 (19 Oct 2021 10:50) (109 - 153)  BP: 68/51 (19 Oct 2021 10:30) (63/42 - 129/67)  BP(mean): 56 (19 Oct 2021 10:30) (48 - 96)  RR: 30 (19 Oct 2021 10:50) (10 - 39)  SpO2: 58% (19 Oct 2021 10:50) (50% - 100%)    Physical Exam:  General: critically ill, intubated, sedated  HEENT: NCAT, EOMI bl, moist mucous membranes   Neck: Supple, nontender, no mass  Neurology: Unable to assess given pt sedated and intubated.   Respiratory: CTA B/L, No W/R/R  CV: RRR, +S1/S2, no murmurs, rubs or gallops  Abdominal: Soft, NT, ND +BSx4, no palpable masses  Extremities: No C/C/E, + peripheral pulses  MSK: Normal ROM, no joint erythema or warmth, no joint swelling   Heme: No obvious ecchymosis or petechiae   Skin: warm, dry, normal color    ECG: NSR with non specific ST wave abnormalities on admission       I&O's Detail    18 Oct 2021 07:  -  19 Oct 2021 07:00  --------------------------------------------------------  IN:    Cisatracurium: 57.6 mL    dextrose 5% + lactated ringers: 1000 mL    dextrose 5% + sodium chloride 0.45% w/ Additives: 210 mL    FentaNYL: 94 mL    IV PiggyBack: 250 mL    IV PiggyBack: 250 mL    IV PiggyBack: 325 mL    Ketamine: 49.8 mL    Lactated Ringers Bolus: 1000 mL    Norepinephrine: 710 mL    Phenylephrine: 303 mL    Propofol: 127.5 mL    Vasopressin: 24 mL  Total IN: 4400.9 mL    OUT:    Indwelling Catheter - Urethral (mL): 250 mL  Total OUT: 250 mL    Total NET: 4150.9 mL      19 Oct 2021 07:01  -  19 Oct 2021 11:27  --------------------------------------------------------  IN:    Cisatracurium: 17.2 mL    FentaNYL: 28.8 mL    IV PiggyBack: 250 mL    Ketamine: 14.4 mL    Lactated Ringers Bolus: 1000 mL    Norepinephrine: 270 mL    Phenylephrine: 252 mL    Propofol: 11.6 mL    Sodium Bicarbonate: 100 mL    Vasopressin: 4.8 mL  Total IN: 1948.8 mL    OUT:    Indwelling Catheter - Urethral (mL): 75 mL  Total OUT: 75 mL    Total NET: 1873.8 mL          LABS:                        9.8    36.85 )-----------( 376      ( 19 Oct 2021 06:17 )             34.9     10    145  |  114<H>  |  17  ----------------------------<  106<H>  4.4   |  11<L>  |  1.10    Ca    6.7<L>      19 Oct 2021 06:17  Phos  6.0     10-19  Mg     2.1     10-19    TPro  3.8<L>  /  Alb  1.2<L>  /  TBili  0.9  /  DBili  x   /  AST  1066<H>  /  ALT  212<H>  /  AlkPhos  161<H>  10-19    CARDIAC MARKERS ( 19 Oct 2021 06:17 )  5.340 ng/mL / x     / 225 U/L / x     / 18.1 ng/mL      PT/INR - ( 19 Oct 2021 06:17 )   PT: 19.8 sec;   INR: 1.74 ratio         PTT - ( 19 Oct 2021 06:17 )  PTT:40.9 sec    I&O's Summary    18 Oct 2021 07:01  -  19 Oct 2021 07:00  --------------------------------------------------------  IN: 4400.9 mL / OUT: 250 mL / NET: 4150.9 mL    19 Oct 2021 07:01  -  19 Oct 2021 11:27  --------------------------------------------------------  IN: 1948.8 mL / OUT: 75 mL / NET: 1873.8 mL      BNP  RADIOLOGY & ADDITIONAL STUDIES: Patient is a 75y old  Female who presents with a chief complaint of weakness, hypoxia (19 Oct 2021 10:18)      HPI:  76 y/o F with PMHx of HTN, HLD, ABDOULAYE s/p stent, PVD s/p right AKA , former smoker, depression, hypothyroidism, GERD, hx of MRSA infection, hx of TB presented to the ED complaining of weakness. Brought in by EMS and was noted to have a room air O2 sat of 88%. Patient states that for the past 3-4 weeks, she has been feeling weaker than usual, has had less of an appetite, and has been vomiting. Patient also states she has been having increased difficulty w/ breathing, primarily when she attempts to move around. As per patient, she came to the ED today because her symptoms were not resolving on their own. Patient lives at home with her  and ambulates with a walker. She has at least a 30 pack year history of smoking, quit in . She stopped her aspirin 1 week ago on her own due to GI symptoms. Her mother  at 47 years old in [place] from an unknown cause. Her brother  at 44 years old from a heart attack. As per patient's daughter, patient has no known family history of cancer. Does note that her leg has become more swollen. Her ashen skin complexion has been the same for the past 11 years.    In the ED,  Vital Signs Last 24 Hrs T(F)Max: 99.1 HR: 82-94 BP: 79/45-88/57 RR: 18 SpO2: 91%  Labs significant for: WBC 15.66, Alk Phos 211, AST 67, PCT 3.85, Lactate 2.5, proBNP 613  CXR: Left sided infiltrate. Bilateral patchiness.  CT CAP: Ground Glass Opacities in right lung. Left lower lobe masslike opacities suspicious for neoplasm. Nodular left-sided pleural thickening highly suspicious for neoplastic disease. Moderate loculated left pleural effusion. Prominent gastrohepatic and celiac lymph nodes. Large right lower quadrant hernia extending into the inguinal canal.  EKG: NSR at 90bpm (16 Oct 2021 19:02)    Overnight there was abrupt deterioration, with evidence of diminished organ perfusion, acidosis, severe hypotension requiring pressors, and bedside pocus suggested abrupt deterioration in lv fxn.  Cardiology consultation was therefore requested.    PAST MEDICAL & SURGICAL HISTORY:  Hypertension    Peripheral vascular disease    HLD (hyperlipidemia)    Hypothyroidism    Esophageal reflux    History of depression    History of methicillin resistant staphylococcus aureus (MRSA)    History of tuberculosis    Peripheral vascular disease    S/P AKA (above knee amputation), right    H/O shoulder surgery    History of total hip replacement    H/O: hysterectomy        ECHO FINDINGS: no recent echo on EMR     MEDICATIONS  (STANDING):  albumin human 25% IVPB 50 milliLiter(s) IV Intermittent every 6 hours  albuterol/ipratropium for Nebulization 3 milliLiter(s) Nebulizer every 6 hours  allopurinol 100 milliGRAM(s) Oral daily  aspirin enteric coated 81 milliGRAM(s) Oral daily  atorvastatin 10 milliGRAM(s) Oral at bedtime  buDESOnide    Inhalation Suspension 0.5 milliGRAM(s) Inhalation every 12 hours  chlorhexidine 0.12% Liquid 15 milliLiter(s) Oral Mucosa every 12 hours  chlorhexidine 4% Liquid 1 Application(s) Topical <User Schedule>  cholecalciferol 1000 Unit(s) Oral daily  cisatracurium Infusion 3 MICROgram(s)/kG/Min (8.64 mL/Hr) IV Continuous <Continuous>  dextrose 5% + lactated ringers. 1000 milliLiter(s) (100 mL/Hr) IV Continuous <Continuous>  fentaNYL   Infusion. 0.5 MICROgram(s)/kG/Hr (2.4 mL/Hr) IV Continuous <Continuous>  heparin   Injectable 5000 Unit(s) SubCutaneous every 12 hours  hydrocortisone sodium succinate Injectable 100 milliGRAM(s) IV Push every 8 hours  influenza   Vaccine 0.5 milliLiter(s) IntraMuscular once  ketamine Infusion. 0.25 mG/kG/Hr (1.2 mL/Hr) IV Continuous <Continuous>  lactobacillus acidophilus 1 Tablet(s) Oral daily  levothyroxine 100 MICROGram(s) Oral daily  milrinone Infusion 0.125 MICROgram(s)/kG/Min (1.8 mL/Hr) IV Continuous <Continuous>  norepinephrine Infusion 0.15 MICROgram(s)/kG/Min (13.5 mL/Hr) IV Continuous <Continuous>  pantoprazole    Tablet 40 milliGRAM(s) Oral before breakfast  phenylephrine    Infusion 0.1 MICROgram(s)/kG/Min (1.8 mL/Hr) IV Continuous <Continuous>  piperacillin/tazobactam IVPB.. 3.375 Gram(s) IV Intermittent every 12 hours  propofol Infusion 10 MICROgram(s)/kG/Min (2.88 mL/Hr) IV Continuous <Continuous>  sertraline 100 milliGRAM(s) Oral daily  sodium bicarbonate  Infusion 0.234 mEq/kG/Hr (75 mL/Hr) IV Continuous <Continuous>  sodium bicarbonate  Injectable 50 milliEquivalent(s) IV Push once  vasopressin Infusion 0.04 Unit(s)/Min (2.4 mL/Hr) IV Continuous <Continuous>    MEDICATIONS  (PRN):  acetaminophen   Tablet .. 650 milliGRAM(s) Oral every 6 hours PRN Temp greater or equal to 38C (100.4F), Mild Pain (1 - 3)  aluminum hydroxide/magnesium hydroxide/simethicone Suspension 30 milliLiter(s) Oral every 4 hours PRN Dyspepsia  benzocaine 15 mG/menthol 3.6 mG (Sugar-Free) Lozenge 1 Lozenge Oral two times a day PRN Sore Throat  fentaNYL    Injectable 12.5 MICROGram(s) IV Push once PRN Moderate Pain (4 - 6)  gabapentin 300 milliGRAM(s) Oral once PRN Phantom pain  melatonin 3 milliGRAM(s) Oral at bedtime PRN Insomnia  montelukast 10 milliGRAM(s) Oral daily PRN allergies  ondansetron Injectable 4 milliGRAM(s) IV Push every 8 hours PRN Nausea and/or Vomiting      FAMILY HISTORY:  Family history of MI (myocardial infarction) (Sibling)  In brother, brother passed away at 43 yo    Family history of valvular heart disease (Sibling)  In sister    Family history of early death (Sibling, Mother)  Brother at 45yo from MI, mother at 46 yo unknown cause    Denies Family history of CAD or early MI    Unable to assess ROS given pt sedated and intubated.     SOCIAL HISTORY: No tobacco, Alcohol or Drug use    Vital Signs Last 24 Hrs  T(C): 35.1 (19 Oct 2021 10:00), Max: 38.7 (18 Oct 2021 20:00)  T(F): 95.2 (19 Oct 2021 10:00), Max: 101.7 (18 Oct 2021 20:00)  HR: 121 (19 Oct 2021 10:50) (109 - 153)  BP: 68/51 (19 Oct 2021 10:30) (63/42 - 129/67)  BP(mean): 56 (19 Oct 2021 10:30) (48 - 96)  RR: 30 (19 Oct 2021 10:50) (10 - 39)  SpO2: 58% (19 Oct 2021 10:50) (50% - 100%)    Physical Exam:  General: critically ill, intubated, sedated  HEENT: NCAT, EOMI bl, moist mucous membranes ett  Neck: Supple, nontender, no mass  Neurology: Unable to assess given pt sedated and intubated.   Respiratory: CTA B/L, No W/R/R  CV: RRR, +S1/S2, no murmurs, rubs or gallops  Abdominal: Soft, NT, ND +BSx4, no palpable masses  Extremities: No C/C/E, no peripheral pulses  MSK: Normal ROM, no joint erythema or warmth, no joint swelling   Heme: No obvious ecchymosis or petechiae   Skin: warm, dry, ashen color    ECG: NSR with non specific ST wave abnormalities on admission       I&O's Detail    18 Oct 2021 07:  -  19 Oct 2021 07:00  --------------------------------------------------------  IN:    Cisatracurium: 57.6 mL    dextrose 5% + lactated ringers: 1000 mL    dextrose 5% + sodium chloride 0.45% w/ Additives: 210 mL    FentaNYL: 94 mL    IV PiggyBack: 250 mL    IV PiggyBack: 250 mL    IV PiggyBack: 325 mL    Ketamine: 49.8 mL    Lactated Ringers Bolus: 1000 mL    Norepinephrine: 710 mL    Phenylephrine: 303 mL    Propofol: 127.5 mL    Vasopressin: 24 mL  Total IN: 4400.9 mL    OUT:    Indwelling Catheter - Urethral (mL): 250 mL  Total OUT: 250 mL    Total NET: 4150.9 mL      19 Oct 2021 07:01  -  19 Oct 2021 11:27  --------------------------------------------------------  IN:    Cisatracurium: 17.2 mL    FentaNYL: 28.8 mL    IV PiggyBack: 250 mL    Ketamine: 14.4 mL    Lactated Ringers Bolus: 1000 mL    Norepinephrine: 270 mL    Phenylephrine: 252 mL    Propofol: 11.6 mL    Sodium Bicarbonate: 100 mL    Vasopressin: 4.8 mL  Total IN: 1948.8 mL    OUT:    Indwelling Catheter - Urethral (mL): 75 mL  Total OUT: 75 mL    Total NET: 1873.8 mL          LABS:                        9.8    36.85 )-----------( 376      ( 19 Oct 2021 06:17 )             34.9     10    145  |  114<H>  |  17  ----------------------------<  106<H>  4.4   |  11<L>  |  1.10    Ca    6.7<L>      19 Oct 2021 06:17  Phos  6.0     10-19  Mg     2.1     10-19    TPro  3.8<L>  /  Alb  1.2<L>  /  TBili  0.9  /  DBili  x   /  AST  1066<H>  /  ALT  212<H>  /  AlkPhos  161<H>  10-19    CARDIAC MARKERS ( 19 Oct 2021 06:17 )  5.340 ng/mL / x     / 225 U/L / x     / 18.1 ng/mL      PT/INR - ( 19 Oct 2021 06:17 )   PT: 19.8 sec;   INR: 1.74 ratio         PTT - ( 19 Oct 2021 06:17 )  PTT:40.9 sec    I&O's Summary    18 Oct 2021 07:  -  19 Oct 2021 07:00  --------------------------------------------------------  IN: 4400.9 mL / OUT: 250 mL / NET: 4150.9 mL    19 Oct 2021 07:01  -  19 Oct 2021 11:27  --------------------------------------------------------  IN: 1948.8 mL / OUT: 75 mL / NET: 1873.8 mL      BNP  RADIOLOGY & ADDITIONAL STUDIES: show

## 2023-07-13 NOTE — PATIENT PROFILE ADULT - PACKS YRS CALCULATION
Reports compliance with her home medication including amlodipine, Lasix  Resume home antihypertensive meds as BP tolerates  Monitor BP     25

## 2025-04-10 NOTE — PROCEDURE NOTE - ESTIMATED BLOOD LOSS
Discharge instructions provided to patient and spouse. Patient verbalized understanding of the instructions. IV removed. Cath tip intact. VSS. No concerns voiced. Pt ambulated to bathroom and voided x 1. No concerns voiced. Escorted patient via wheelchair to family member awaiting in private vehicle.  
Pt in preop bay 31, VSS, and IV inserted. Pt denies any open wounds on body. Last used semiglutide 4/2. Pt needs procedural consents signed, anesthesia consents signed, updated H&P, otherwise ready to roll.   
Minimal
None
None
Minimal